# Patient Record
Sex: MALE | Race: BLACK OR AFRICAN AMERICAN | HISPANIC OR LATINO | URBAN - METROPOLITAN AREA
[De-identification: names, ages, dates, MRNs, and addresses within clinical notes are randomized per-mention and may not be internally consistent; named-entity substitution may affect disease eponyms.]

---

## 2017-04-05 ENCOUNTER — INPATIENT (INPATIENT)
Facility: HOSPITAL | Age: 52
LOS: 4 days | Discharge: HOME | End: 2017-04-10
Attending: PSYCHIATRY & NEUROLOGY

## 2017-06-27 DIAGNOSIS — F17.210 NICOTINE DEPENDENCE, CIGARETTES, UNCOMPLICATED: ICD-10-CM

## 2017-06-27 DIAGNOSIS — F33.2 MAJOR DEPRESSIVE DISORDER, RECURRENT SEVERE WITHOUT PSYCHOTIC FEATURES: ICD-10-CM

## 2017-06-27 DIAGNOSIS — F12.10 CANNABIS ABUSE, UNCOMPLICATED: ICD-10-CM

## 2017-06-27 DIAGNOSIS — F31.4 BIPOLAR DISORDER, CURRENT EPISODE DEPRESSED, SEVERE, WITHOUT PSYCHOTIC FEATURES: ICD-10-CM

## 2017-06-27 DIAGNOSIS — R45.851 SUICIDAL IDEATIONS: ICD-10-CM

## 2017-06-27 DIAGNOSIS — F14.10 COCAINE ABUSE, UNCOMPLICATED: ICD-10-CM

## 2017-06-27 DIAGNOSIS — F10.20 ALCOHOL DEPENDENCE, UNCOMPLICATED: ICD-10-CM

## 2017-06-27 DIAGNOSIS — Z91.5 PERSONAL HISTORY OF SELF-HARM: ICD-10-CM

## 2017-07-09 ENCOUNTER — INPATIENT (INPATIENT)
Facility: HOSPITAL | Age: 52
LOS: 1 days | Discharge: HOME | End: 2017-07-11
Attending: PSYCHIATRY & NEUROLOGY

## 2017-07-09 DIAGNOSIS — F10.20 ALCOHOL DEPENDENCE, UNCOMPLICATED: ICD-10-CM

## 2017-07-09 DIAGNOSIS — F17.200 NICOTINE DEPENDENCE, UNSPECIFIED, UNCOMPLICATED: ICD-10-CM

## 2017-07-09 DIAGNOSIS — F93.0 SEPARATION ANXIETY DISORDER OF CHILDHOOD: ICD-10-CM

## 2017-07-17 DIAGNOSIS — Z91.5 PERSONAL HISTORY OF SELF-HARM: ICD-10-CM

## 2017-07-17 DIAGNOSIS — Z88.0 ALLERGY STATUS TO PENICILLIN: ICD-10-CM

## 2017-07-17 DIAGNOSIS — F12.10 CANNABIS ABUSE, UNCOMPLICATED: ICD-10-CM

## 2017-07-17 DIAGNOSIS — R45.851 SUICIDAL IDEATIONS: ICD-10-CM

## 2017-07-17 DIAGNOSIS — Z91.19 PATIENT'S NONCOMPLIANCE WITH OTHER MEDICAL TREATMENT AND REGIMEN: ICD-10-CM

## 2017-07-17 DIAGNOSIS — F41.9 ANXIETY DISORDER, UNSPECIFIED: ICD-10-CM

## 2017-07-17 DIAGNOSIS — F31.9 BIPOLAR DISORDER, UNSPECIFIED: ICD-10-CM

## 2017-07-17 DIAGNOSIS — F15.10 OTHER STIMULANT ABUSE, UNCOMPLICATED: ICD-10-CM

## 2017-07-17 DIAGNOSIS — F17.200 NICOTINE DEPENDENCE, UNSPECIFIED, UNCOMPLICATED: ICD-10-CM

## 2017-11-25 ENCOUNTER — INPATIENT (INPATIENT)
Facility: HOSPITAL | Age: 52
LOS: 5 days | Discharge: HOME | DRG: 885 | End: 2017-12-01
Attending: STUDENT IN AN ORGANIZED HEALTH CARE EDUCATION/TRAINING PROGRAM | Admitting: STUDENT IN AN ORGANIZED HEALTH CARE EDUCATION/TRAINING PROGRAM
Payer: COMMERCIAL

## 2017-11-25 VITALS
SYSTOLIC BLOOD PRESSURE: 125 MMHG | TEMPERATURE: 98 F | DIASTOLIC BLOOD PRESSURE: 75 MMHG | RESPIRATION RATE: 17 BRPM | HEART RATE: 88 BPM | OXYGEN SATURATION: 95 %

## 2017-11-25 DIAGNOSIS — F31.63 BIPOLAR DISORDER, CURRENT EPISODE MIXED, SEVERE, WITHOUT PSYCHOTIC FEATURES: ICD-10-CM

## 2017-11-25 LAB
ALBUMIN SERPL ELPH-MCNC: 4.5 G/DL — SIGNIFICANT CHANGE UP (ref 3.3–5)
ALP SERPL-CCNC: 85 U/L — SIGNIFICANT CHANGE UP (ref 40–120)
ALT FLD-CCNC: 37 U/L — SIGNIFICANT CHANGE UP (ref 10–45)
ANION GAP SERPL CALC-SCNC: 17 MMOL/L — SIGNIFICANT CHANGE UP (ref 5–17)
APAP SERPL-MCNC: <15 UG/ML — SIGNIFICANT CHANGE UP (ref 10–30)
APPEARANCE UR: CLEAR — SIGNIFICANT CHANGE UP
AST SERPL-CCNC: 40 U/L — SIGNIFICANT CHANGE UP (ref 10–40)
BASOPHILS NFR BLD AUTO: 0.5 % — SIGNIFICANT CHANGE UP (ref 0–2)
BILIRUB SERPL-MCNC: 0.2 MG/DL — SIGNIFICANT CHANGE UP (ref 0.2–1.2)
BILIRUB UR-MCNC: NEGATIVE — SIGNIFICANT CHANGE UP
BUN SERPL-MCNC: 20 MG/DL — SIGNIFICANT CHANGE UP (ref 7–23)
CALCIUM SERPL-MCNC: 9.4 MG/DL — SIGNIFICANT CHANGE UP (ref 8.4–10.5)
CHLORIDE SERPL-SCNC: 94 MMOL/L — LOW (ref 96–108)
CO2 SERPL-SCNC: 24 MMOL/L — SIGNIFICANT CHANGE UP (ref 22–31)
COLOR SPEC: YELLOW — SIGNIFICANT CHANGE UP
CREAT SERPL-MCNC: 0.87 MG/DL — SIGNIFICANT CHANGE UP (ref 0.5–1.3)
DIFF PNL FLD: NEGATIVE — SIGNIFICANT CHANGE UP
EOSINOPHIL NFR BLD AUTO: 6.1 % — HIGH (ref 0–6)
ETHANOL SERPL-MCNC: <10 MG/DL — SIGNIFICANT CHANGE UP (ref 0–10)
GLUCOSE SERPL-MCNC: 156 MG/DL — HIGH (ref 70–99)
GLUCOSE UR QL: NEGATIVE — SIGNIFICANT CHANGE UP
HCT VFR BLD CALC: 42 % — SIGNIFICANT CHANGE UP (ref 39–50)
HGB BLD-MCNC: 13.9 G/DL — SIGNIFICANT CHANGE UP (ref 13–17)
KETONES UR-MCNC: (no result) MG/DL
LEUKOCYTE ESTERASE UR-ACNC: NEGATIVE — SIGNIFICANT CHANGE UP
LYMPHOCYTES # BLD AUTO: 29.1 % — SIGNIFICANT CHANGE UP (ref 13–44)
MCHC RBC-ENTMCNC: 29.1 PG — SIGNIFICANT CHANGE UP (ref 27–34)
MCHC RBC-ENTMCNC: 33.1 G/DL — SIGNIFICANT CHANGE UP (ref 32–36)
MCV RBC AUTO: 88.1 FL — SIGNIFICANT CHANGE UP (ref 80–100)
MONOCYTES NFR BLD AUTO: 9.2 % — SIGNIFICANT CHANGE UP (ref 2–14)
NEUTROPHILS NFR BLD AUTO: 55.1 % — SIGNIFICANT CHANGE UP (ref 43–77)
NITRITE UR-MCNC: NEGATIVE — SIGNIFICANT CHANGE UP
PCP SPEC-MCNC: SIGNIFICANT CHANGE UP
PH UR: 5.5 — SIGNIFICANT CHANGE UP (ref 5–8)
PLATELET # BLD AUTO: 185 K/UL — SIGNIFICANT CHANGE UP (ref 150–400)
POTASSIUM SERPL-MCNC: 3.5 MMOL/L — SIGNIFICANT CHANGE UP (ref 3.5–5.3)
POTASSIUM SERPL-SCNC: 3.5 MMOL/L — SIGNIFICANT CHANGE UP (ref 3.5–5.3)
PROT SERPL-MCNC: 8.1 G/DL — SIGNIFICANT CHANGE UP (ref 6–8.3)
PROT UR-MCNC: NEGATIVE MG/DL — SIGNIFICANT CHANGE UP
RBC # BLD: 4.77 M/UL — SIGNIFICANT CHANGE UP (ref 4.2–5.8)
RBC # FLD: 13.1 % — SIGNIFICANT CHANGE UP (ref 10.3–16.9)
SALICYLATES SERPL-MCNC: <0.3 MG/DL — LOW (ref 2.8–20)
SODIUM SERPL-SCNC: 135 MMOL/L — SIGNIFICANT CHANGE UP (ref 135–145)
SP GR SPEC: 1.02 — SIGNIFICANT CHANGE UP (ref 1–1.03)
UROBILINOGEN FLD QL: 0.2 E.U./DL — SIGNIFICANT CHANGE UP
WBC # BLD: 12.6 K/UL — HIGH (ref 3.8–10.5)
WBC # FLD AUTO: 12.6 K/UL — HIGH (ref 3.8–10.5)

## 2017-11-25 PROCEDURE — 93010 ELECTROCARDIOGRAM REPORT: CPT | Mod: 77

## 2017-11-25 PROCEDURE — 93010 ELECTROCARDIOGRAM REPORT: CPT

## 2017-11-25 PROCEDURE — 99285 EMERGENCY DEPT VISIT HI MDM: CPT | Mod: 25

## 2017-11-25 PROCEDURE — G0427: CPT | Mod: GT

## 2017-11-25 RX ORDER — QUETIAPINE FUMARATE 200 MG/1
100 TABLET, FILM COATED ORAL ONCE
Qty: 0 | Refills: 0 | Status: COMPLETED | OUTPATIENT
Start: 2017-11-25 | End: 2017-11-25

## 2017-11-25 RX ORDER — HALOPERIDOL DECANOATE 100 MG/ML
5 INJECTION INTRAMUSCULAR EVERY 8 HOURS
Qty: 0 | Refills: 0 | Status: DISCONTINUED | OUTPATIENT
Start: 2017-11-25 | End: 2017-12-01

## 2017-11-25 RX ORDER — NICOTINE POLACRILEX 2 MG
1 GUM BUCCAL DAILY
Qty: 0 | Refills: 0 | Status: DISCONTINUED | OUTPATIENT
Start: 2017-11-25 | End: 2017-11-28

## 2017-11-25 RX ORDER — HYDROXYZINE HCL 10 MG
25 TABLET ORAL AT BEDTIME
Qty: 0 | Refills: 0 | Status: DISCONTINUED | OUTPATIENT
Start: 2017-11-25 | End: 2017-12-01

## 2017-11-25 RX ADMIN — QUETIAPINE FUMARATE 100 MILLIGRAM(S): 200 TABLET, FILM COATED ORAL at 21:12

## 2017-11-25 NOTE — ED ADULT TRIAGE NOTE - ARRIVAL INFO ADDITIONAL COMMENTS
Pt walked into Ed with c/o of depression and SI thoughts. Pt has thoughts of cutting his left arm with a razor. Pt. states he walked into Ed and gave his razor to security and is seeking help. PT. denies hallucinations, cutting himself, CP, alcohol or drugs. PT. has hx of depression and bipolar disorder. PT. has hx of SI attempts ( overdose medication).  Denies taking his medication. Pt has one to one at bedside

## 2017-11-25 NOTE — ED BEHAVIORAL HEALTH ASSESSMENT NOTE - PSYCHIATRIC ISSUES AND PLAN (INCLUDE STANDING AND PRN MEDICATION)
will order prn for evening, but will defer meds to primary team after currently state of intox clears

## 2017-11-25 NOTE — ED BEHAVIORAL HEALTH ASSESSMENT NOTE - RISK ASSESSMENT
Patient is an acute risk to himself given his presented after purchasing a razor to cut himself. He has limited supports and ongoing substance use, and has disengaged with care increasing his risk of harm to self or others. He requires hospitalization for safety and stabilization.

## 2017-11-25 NOTE — ED BEHAVIORAL HEALTH ASSESSMENT NOTE - SUMMARY
51yo AA man, living with gf, unemployed, with dx of bipolar disorder, multiple prior admissions since childhood, 1 prior suicide attempt in 20s, hx of SIB, +alcohol, cannabis, cocaine use, incarcerated for 6 years for drug possession/grand larceny, no known hx of violence, presents with reports of suicidal thinking with plan to cut self in context of reported manic episode for 3 days and substance use. On exam he is odd, with incongruent affect, and continues to endorse SI.  Utox positive for cocaine and THC, likely contributing factors. He requesting hospitalization for safety and stabilization.

## 2017-11-25 NOTE — ED BEHAVIORAL HEALTH ASSESSMENT NOTE - DESCRIPTION (FIRST USE, LAST USE, QUANTITY, FREQUENCY, DURATION)
1ppd drinks heavily on weekends 8 beers + 1 pint, no hx of withdrawal symptoms smokes 2-3x/week "when I can get the money"

## 2017-11-25 NOTE — ED BEHAVIORAL HEALTH ASSESSMENT NOTE - DESCRIPTION
calm and cooperative none Grew up in [place], lived in a [facility] for some time, younger brother and several older siblings, did not finish Snapchat, does handy jobs for neighbors, was  young but  for 15 years, no children

## 2017-11-25 NOTE — ED BEHAVIORAL HEALTH ASSESSMENT NOTE - OTHER PAST PSYCHIATRIC HISTORY (INCLUDE DETAILS REGARDING ONSET, COURSE OF ILLNESS, INPATIENT/OUTPATIENT TREATMENT)
Long history. Was hospitalized at [facility] for 3-6 months as a child, states his mother couldn't control him. Diagnosed with bipolar disorder. Multiple prior hospitalizations and 2 years in forensic unit when incarcerated. Recently in outpatient treatment in [place]

## 2017-11-25 NOTE — ED PROVIDER NOTE - PHYSICAL EXAMINATION
CON: ao x 3, HENMT: clear oropharynx, soft neck, HEAD: atraumatic, CV: rrr, equal pulses b/l, RESP: cta b/l, GI: +BS, soft, nontender, no rebound, no guarding, SKIN: old scar noted to L forearm, MSK: no edema, moving all extremities spontaneously, PSYCH: active SI, depressed affect

## 2017-11-25 NOTE — ED ADULT NURSE NOTE - NSSISCREENINGSIGNS_ED_A_ED
hopelessness/substance abuse ( +sbirt)/talking about suicide hopelessness/non-complaint with treatments/talking about suicide/substance abuse ( +sbirt)

## 2017-11-25 NOTE — ED BEHAVIORAL HEALTH ASSESSMENT NOTE - AXIS IV
Problems with primary support/Housing problems/Problem related to social environment/Economic problems

## 2017-11-25 NOTE — ED BEHAVIORAL HEALTH ASSESSMENT NOTE - HPI (INCLUDE ILLNESS QUALITY, SEVERITY, DURATION, TIMING, CONTEXT, MODIFYING FACTORS, ASSOCIATED SIGNS AND SYMPTOMS)
51yo AA man, living with gf, unemployed, with dx of bipolar disorder, multiple prior admissions since childhood, 1 prior suicide attempt in 20s, hx of SIB, +alcohol, cannabis, cocaine use, incarcerated for 6 years for drug possession/grand larceny, no known hx of violence, presents with reports of suicidal thinking with plan to cut self. He states that for the past few days he has been walking around the city nonstop in a "manic" episode, not sleeping, but also feeling depressed and using drugs. He denies other symptoms of depression and jadon, but states that today he had a plan to cut himself, bought a razor but then gave it to  in ER. He states he stopped his meds a month ago because he read that depakote can hurt his liver and the seroquel was making him tired. He has a psychiatrist in [place] Dr Jeremy Rucker(?). HE spoke with his family in [place] yesterday but he states he wasn't able to visit them because they are afraid that he won't leave. He would not give his gf's information stating that she doesn't want him to and that he thinks she's embarrassed of him. He gave contact info for brother who didn't answer the phone overnight: kimmie morrow [phone]

## 2017-11-25 NOTE — ED PROVIDER NOTE - OBJECTIVE STATEMENT
52 yom pw SI, w/ cutting wrist today.  hx of depression.  had previous attempt early 2017, placed on meds but stopped d/t concern for SE.  reports worsening of depression.  reports occasional auditory hallucination w/ voice telling him he's worthless but does not recall whose voice it is.  no HI, no visual hallucination.

## 2017-11-25 NOTE — ED ADULT NURSE NOTE - SUICIDE ATTEMPT DETAILS
h/o of cutting wrist . Attempted today, no laceration ,no abrasion noted. to both wrist and decided to see psychiatrist

## 2017-11-26 DIAGNOSIS — F19.10 OTHER PSYCHOACTIVE SUBSTANCE ABUSE, UNCOMPLICATED: ICD-10-CM

## 2017-11-26 RX ORDER — QUETIAPINE FUMARATE 200 MG/1
100 TABLET, FILM COATED ORAL AT BEDTIME
Qty: 0 | Refills: 0 | Status: DISCONTINUED | OUTPATIENT
Start: 2017-11-26 | End: 2017-11-28

## 2017-11-26 RX ADMIN — QUETIAPINE FUMARATE 100 MILLIGRAM(S): 200 TABLET, FILM COATED ORAL at 22:23

## 2017-11-26 NOTE — PROGRESS NOTE BEHAVIORAL HEALTH - NSBHFUPINTERVALHXFT_PSY_A_CORE
No acute events over night. Patient does not show si/sx of alcohol withdrawal. Mood improved. Patient active in community. No complaints offered.

## 2017-11-27 PROCEDURE — 99223 1ST HOSP IP/OBS HIGH 75: CPT

## 2017-11-27 RX ADMIN — QUETIAPINE FUMARATE 100 MILLIGRAM(S): 200 TABLET, FILM COATED ORAL at 22:47

## 2017-11-27 RX ADMIN — Medication 1 APPLICATION(S): at 18:07
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
## 2017-11-27 NOTE — BEHAVIORAL HEALTH ASSESSMENT NOTE - SUMMARY
53yo AA man, living with gf, unemployed, with dx of bipolar disorder, multiple prior admissions since childhood, 1 prior suicide attempt in 20s, hx of SIB, +alcohol, cannabis, cocaine use, incarcerated for 6 years for drug possession/grand larceny, no known hx of violence, presents with reports of suicidal thinking with plan to cut self in context of reported manic episode for 3 days and substance use. On exam he is odd, with incongruent affect, and continues to endorse SI.  Utox positive for cocaine and THC, likely contributing factors.

## 2017-11-27 NOTE — BEHAVIORAL HEALTH ASSESSMENT NOTE - NSBHADMITCOUNSEL_PSY_A_CORE
importance of adherence to chosen treatment/risks and benefits of treatment options/instructions for management, treatment and follow up

## 2017-11-27 NOTE — BEHAVIORAL HEALTH ASSESSMENT NOTE - HPI (INCLUDE ILLNESS QUALITY, SEVERITY, DURATION, TIMING, CONTEXT, MODIFYING FACTORS, ASSOCIATED SIGNS AND SYMPTOMS)
This is a 53 y/o single AA male, currently living with  and unemployed. He has a psychiatric history of multiply prior psychiatric hospitalizations, a history of one documented suicide attempt in his 20s, a history of NSSIB, polysubstance use (alcohol, cocaine, cannabis), prior incarceration (serving 6 years of drug possession/grand larceny. Patient self-presented to ED with suicidal ideations with plans of cutting himself.     Patient stated that he has been feeling depressed for the last few days as he stopped taking his medications some time ago. He states that he found out that the medications he was prescribed could affect his liver. He reports that he got frustrated as his dosage of his Depakote and his Seroquel were continually being raised, respectively 500mg BID and 200mg BID and informed his psychiatrist that he would stop taking them. He denied having any side effects or adverse reactions to med regimen and reported that his mood was stable. Patient shared that he was thinking of cutting his arm with a razor, but instead gave the razor to a  in the ER. He has prior history of NSSIB, healed superficial lacerations noted on left forearm. He endorses feelings of hopelessness, helplessness, unable to provide any protective factors against suicide. He terminated evaluation, reporting that he no longer wanted to speak and would only consider taking Seroquel at this time.     Per ED report, patient stated that he was endorsing feelings of jadon, not sleeping, walking around the city non-stop, but also experiencing depression and using substances.

## 2017-11-27 NOTE — BEHAVIORAL HEALTH ASSESSMENT NOTE - PROBLEM SELECTOR PLAN 1
Restart Seroquel at 100mg QHS and titrate to BID  Labs ordered for 11/28 am: a1c, lipid panel, folate, thyroid panel, vitamin b12, vitamin d, depakote

## 2017-11-27 NOTE — BEHAVIORAL HEALTH ASSESSMENT NOTE - NSBHCHARTREVIEWVS_PSY_A_CORE FT
Vital Signs Last 24 Hrs  T(C): 36.9 (27 Nov 2017 10:00), Max: 36.9 (26 Nov 2017 16:14)  T(F): 98.4 (27 Nov 2017 10:00), Max: 98.5 (26 Nov 2017 16:14)  HR: 80 (27 Nov 2017 10:00) (74 - 80)  BP: 155/85 (27 Nov 2017 10:00) (155/85 - 165/93)  BP(mean): --  RR: 16 (27 Nov 2017 10:00) (16 - 17)  SpO2: --

## 2017-11-27 NOTE — BEHAVIORAL HEALTH ASSESSMENT NOTE - OTHER PAST PSYCHIATRIC HISTORY (INCLUDE DETAILS REGARDING ONSET, COURSE OF ILLNESS, INPATIENT/OUTPATIENT TREATMENT)
Patient has a history of multiple psychiatric hospitalizations since childhood, last hospitalization at [facility] was several years ago. Per review of medical records, patient has a history of bipolar disorder, polysubstance use disorder, history of medication non-compliance.

## 2017-11-28 DIAGNOSIS — R52 PAIN, UNSPECIFIED: ICD-10-CM

## 2017-11-28 DIAGNOSIS — F17.200 NICOTINE DEPENDENCE, UNSPECIFIED, UNCOMPLICATED: ICD-10-CM

## 2017-11-28 DIAGNOSIS — I10 ESSENTIAL (PRIMARY) HYPERTENSION: ICD-10-CM

## 2017-11-28 LAB
24R-OH-CALCIDIOL SERPL-MCNC: 15.2 NG/ML — LOW (ref 30–80)
CHOLEST SERPL-MCNC: 173 MG/DL — SIGNIFICANT CHANGE UP (ref 10–199)
FOLATE SERPL-MCNC: 12.4 NG/ML — SIGNIFICANT CHANGE UP (ref 4.8–24.2)
HDLC SERPL-MCNC: 44 MG/DL — SIGNIFICANT CHANGE UP (ref 40–125)
LIPID PNL WITH DIRECT LDL SERPL: 103 MG/DL — SIGNIFICANT CHANGE UP
T3 SERPL-MCNC: 103 NG/DL — SIGNIFICANT CHANGE UP (ref 80–200)
T4 AB SER-ACNC: 5.64 UG/DL — SIGNIFICANT CHANGE UP (ref 3.17–11.72)
TOTAL CHOLESTEROL/HDL RATIO MEASUREMENT: 3.9 RATIO — SIGNIFICANT CHANGE UP (ref 3.4–9.6)
TRIGL SERPL-MCNC: 130 MG/DL — SIGNIFICANT CHANGE UP (ref 10–149)
TSH SERPL-MCNC: 0.61 UIU/ML — SIGNIFICANT CHANGE UP (ref 0.35–4.94)
VALPROATE SERPL-MCNC: <3 UG/ML — LOW (ref 50–100)
VIT B12 SERPL-MCNC: 356 PG/ML — SIGNIFICANT CHANGE UP (ref 243–894)

## 2017-11-28 PROCEDURE — 99222 1ST HOSP IP/OBS MODERATE 55: CPT | Mod: GC

## 2017-11-28 PROCEDURE — 99232 SBSQ HOSP IP/OBS MODERATE 35: CPT

## 2017-11-28 RX ORDER — NICOTINE POLACRILEX 2 MG
1 GUM BUCCAL DAILY
Qty: 0 | Refills: 0 | Status: DISCONTINUED | OUTPATIENT
Start: 2017-11-28 | End: 2017-12-01

## 2017-11-28 RX ORDER — IBUPROFEN 200 MG
600 TABLET ORAL EVERY 8 HOURS
Qty: 0 | Refills: 0 | Status: DISCONTINUED | OUTPATIENT
Start: 2017-11-28 | End: 2017-12-01

## 2017-11-28 RX ORDER — CHLORTHALIDONE 50 MG
12.5 TABLET ORAL ONCE
Qty: 0 | Refills: 0 | Status: COMPLETED | OUTPATIENT
Start: 2017-11-28 | End: 2017-11-28

## 2017-11-28 RX ORDER — NYSTATIN CREAM 100000 [USP'U]/G
1 CREAM TOPICAL
Qty: 0 | Refills: 0 | Status: DISCONTINUED | OUTPATIENT
Start: 2017-11-28 | End: 2017-12-01

## 2017-11-28 RX ORDER — QUETIAPINE FUMARATE 200 MG/1
200 TABLET, FILM COATED ORAL AT BEDTIME
Qty: 0 | Refills: 0 | Status: DISCONTINUED | OUTPATIENT
Start: 2017-11-28 | End: 2017-11-29

## 2017-11-28 RX ORDER — NICOTINE POLACRILEX 2 MG
2 GUM BUCCAL
Qty: 0 | Refills: 0 | Status: DISCONTINUED | OUTPATIENT
Start: 2017-11-28 | End: 2017-12-01

## 2017-11-28 RX ORDER — BACITRACIN ZINC 500 UNIT/G
1 OINTMENT IN PACKET (EA) TOPICAL
Qty: 0 | Refills: 0 | Status: DISCONTINUED | OUTPATIENT
Start: 2017-11-28 | End: 2017-11-28

## 2017-11-28 RX ORDER — CHLORTHALIDONE 50 MG
12.5 TABLET ORAL DAILY
Qty: 0 | Refills: 0 | Status: DISCONTINUED | OUTPATIENT
Start: 2017-11-28 | End: 2017-12-01

## 2017-11-28 RX ADMIN — Medication 600 MILLIGRAM(S): at 15:55

## 2017-11-28 RX ADMIN — Medication 1 PATCH: at 19:49

## 2017-11-28 RX ADMIN — Medication 1 MILLIGRAM(S): at 22:55

## 2017-11-28 RX ADMIN — Medication 600 MILLIGRAM(S): at 13:34

## 2017-11-28 RX ADMIN — Medication 600 MILLIGRAM(S): at 22:08

## 2017-11-28 RX ADMIN — Medication 600 MILLIGRAM(S): at 22:47

## 2017-11-28 RX ADMIN — Medication 12.5 MILLIGRAM(S): at 22:25

## 2017-11-28 RX ADMIN — NYSTATIN CREAM 1 APPLICATION(S): 100000 CREAM TOPICAL at 22:06

## 2017-11-28 RX ADMIN — Medication 1 APPLICATION(S): at 13:59

## 2017-11-28 RX ADMIN — QUETIAPINE FUMARATE 200 MILLIGRAM(S): 200 TABLET, FILM COATED ORAL at 22:06

## 2017-11-28 NOTE — PROGRESS NOTE BEHAVIORAL HEALTH - NSBHFUPINTERVALHXFT_PSY_A_CORE
Patient reports feeling unwell today. He reports that he has been thinking a lot about his past and his future and is finding the holidays a difficult time of year to deal with. He expressed remorse regarding past actions, but is also hopeful that things will get better in the future and verbalizing that suicide isn't an option. Patient reports feeling unwell today. He reports that he has been thinking a lot about his past and his future and is finding the holidays a difficult time of year to deal with. He expressed remorse regarding past actions, but is also hopeful that things will get better in the future and verbalizing that suicide isn't an option. He states that he submitted a 72 hour letter last night due to craving for tobacco, but soon retracted his letter as he felt he wasn't ready to be discharged yet. He states that he will stay in the hospital until he is better stabilized. He currently denies si/hi, no a/v hallucinations or paranoia

## 2017-11-28 NOTE — PROGRESS NOTE BEHAVIORAL HEALTH - PROBLEM SELECTOR PLAN 1
Increase Seroquel to 200mg QHS, continue titration as appropriate  Labs ordered for 11/28 am: a1c, lipid panel, folate, thyroid panel, vitamin b12, vitamin d, depakote

## 2017-11-29 DIAGNOSIS — B35.3 TINEA PEDIS: ICD-10-CM

## 2017-11-29 PROCEDURE — 99233 SBSQ HOSP IP/OBS HIGH 50: CPT | Mod: GC

## 2017-11-29 PROCEDURE — 99232 SBSQ HOSP IP/OBS MODERATE 35: CPT

## 2017-11-29 RX ORDER — CHOLECALCIFEROL (VITAMIN D3) 125 MCG
1000 CAPSULE ORAL DAILY
Qty: 0 | Refills: 0 | Status: DISCONTINUED | OUTPATIENT
Start: 2017-11-29 | End: 2017-12-01

## 2017-11-29 RX ORDER — QUETIAPINE FUMARATE 200 MG/1
100 TABLET, FILM COATED ORAL
Qty: 0 | Refills: 0 | Status: DISCONTINUED | OUTPATIENT
Start: 2017-11-29 | End: 2017-12-01

## 2017-11-29 RX ORDER — ESCITALOPRAM OXALATE 10 MG/1
10 TABLET, FILM COATED ORAL DAILY
Qty: 0 | Refills: 0 | Status: DISCONTINUED | OUTPATIENT
Start: 2017-11-30 | End: 2017-12-01

## 2017-11-29 RX ADMIN — Medication 1000 UNIT(S): at 19:35

## 2017-11-29 RX ADMIN — Medication 600 MILLIGRAM(S): at 19:46

## 2017-11-29 RX ADMIN — QUETIAPINE FUMARATE 100 MILLIGRAM(S): 200 TABLET, FILM COATED ORAL at 22:16

## 2017-11-29 RX ADMIN — NYSTATIN CREAM 1 APPLICATION(S): 100000 CREAM TOPICAL at 16:05

## 2017-11-29 RX ADMIN — Medication 600 MILLIGRAM(S): at 19:47

## 2017-11-29 RX ADMIN — Medication 12.5 MILLIGRAM(S): at 16:05

## 2017-11-29 RX ADMIN — Medication 1 PATCH: at 19:10

## 2017-11-29 NOTE — PROGRESS NOTE BEHAVIORAL HEALTH - NSBHFUPINTERVALHXFT_PSY_A_CORE
Patient reports that he has been thinking a lot about his aftercare and discharge and intends to take a bus from [facility] to spend time with his family in [place] for the holidays at time of discharge. He endorses feelings of depression and feelings of remorse, but is hopeful that things will get better and was open to discussing medication options to help treat his depression. He denies si/hi, no a/v hallucinations or paranoia. He reported being unable to tolerate yesterday's increase in Seroquel preferring it to be dosed 100mg BID instead of 200mg QHS. He is also tolerated the new addition of his antihypertensive to his regimen with side effects or adverse reactions.

## 2017-11-29 NOTE — PROGRESS NOTE BEHAVIORAL HEALTH - PROBLEM SELECTOR PLAN 1
Increase Seroquel to 200mg QHS, continue titration as appropriate  Labs ordered for 11/28 am: a1c, lipid panel, folate, thyroid panel, vitamin b12, vitamin d, depakote Seroquel 100mg BID, continue titration as appropriate  Lexapro 10mg daily

## 2017-11-30 PROCEDURE — 99233 SBSQ HOSP IP/OBS HIGH 50: CPT

## 2017-11-30 PROCEDURE — 99232 SBSQ HOSP IP/OBS MODERATE 35: CPT

## 2017-11-30 RX ADMIN — Medication 1 APPLICATION(S): at 22:31

## 2017-11-30 RX ADMIN — Medication 1 APPLICATION(S): at 14:01

## 2017-11-30 RX ADMIN — Medication 600 MILLIGRAM(S): at 15:11

## 2017-11-30 RX ADMIN — QUETIAPINE FUMARATE 100 MILLIGRAM(S): 200 TABLET, FILM COATED ORAL at 22:31

## 2017-11-30 RX ADMIN — Medication 12.5 MILLIGRAM(S): at 16:34

## 2017-11-30 NOTE — PROGRESS NOTE BEHAVIORAL HEALTH - NSBHFUPINTERVALHXFT_PSY_A_CORE
Patient reports feeling better today and is requesting discharge so that he can get his clothing from his girlfriend's house and plan to get to [place] by the weekend. He states that he believes that he will be able to get one of his brother's or another friend to help buy his bus ticket. He denies any symptoms of depression, low mood or anxiety. No si/hi, no a/v hallucinations or paranoia. Sleep and appetite are reportedly fair. He states that he did not take any of his am medications this morning as he wanted to sleep, but will take them later in the afternoon.    Per staff report, patient has been selectively attending groups and interacting with peers. Report also states that patient appeared menacing towards staff last night, but was in behavioral control.

## 2017-12-01 VITALS
HEART RATE: 68 BPM | RESPIRATION RATE: 18 BRPM | DIASTOLIC BLOOD PRESSURE: 97 MMHG | SYSTOLIC BLOOD PRESSURE: 143 MMHG | TEMPERATURE: 97 F

## 2017-12-01 PROCEDURE — 99238 HOSP IP/OBS DSCHRG MGMT 30/<: CPT

## 2017-12-01 PROCEDURE — 80053 COMPREHEN METABOLIC PANEL: CPT

## 2017-12-01 PROCEDURE — 84443 ASSAY THYROID STIM HORMONE: CPT

## 2017-12-01 PROCEDURE — 99285 EMERGENCY DEPT VISIT HI MDM: CPT | Mod: 25

## 2017-12-01 PROCEDURE — 80164 ASSAY DIPROPYLACETIC ACD TOT: CPT

## 2017-12-01 PROCEDURE — 80061 LIPID PANEL: CPT

## 2017-12-01 PROCEDURE — 82746 ASSAY OF FOLIC ACID SERUM: CPT

## 2017-12-01 PROCEDURE — 36415 COLL VENOUS BLD VENIPUNCTURE: CPT

## 2017-12-01 PROCEDURE — 82306 VITAMIN D 25 HYDROXY: CPT

## 2017-12-01 PROCEDURE — 80307 DRUG TEST PRSMV CHEM ANLYZR: CPT

## 2017-12-01 PROCEDURE — 93005 ELECTROCARDIOGRAM TRACING: CPT

## 2017-12-01 PROCEDURE — 85025 COMPLETE CBC W/AUTO DIFF WBC: CPT

## 2017-12-01 PROCEDURE — 81003 URINALYSIS AUTO W/O SCOPE: CPT

## 2017-12-01 PROCEDURE — 84436 ASSAY OF TOTAL THYROXINE: CPT

## 2017-12-01 PROCEDURE — 82607 VITAMIN B-12: CPT

## 2017-12-01 PROCEDURE — 84480 ASSAY TRIIODOTHYRONINE (T3): CPT

## 2017-12-01 RX ORDER — QUETIAPINE FUMARATE 200 MG/1
1 TABLET, FILM COATED ORAL
Qty: 0 | Refills: 0 | COMMUNITY
Start: 2017-12-01

## 2017-12-01 RX ORDER — QUETIAPINE FUMARATE 200 MG/1
1 TABLET, FILM COATED ORAL
Qty: 28 | Refills: 0 | OUTPATIENT
Start: 2017-12-01 | End: 2017-12-15

## 2017-12-01 RX ORDER — CHLORTHALIDONE 50 MG
0.5 TABLET ORAL
Qty: 15 | Refills: 0 | OUTPATIENT
Start: 2017-12-01 | End: 2017-12-31

## 2017-12-01 RX ORDER — NYSTATIN CREAM 100000 [USP'U]/G
1 CREAM TOPICAL
Qty: 1 | Refills: 0 | OUTPATIENT
Start: 2017-12-01 | End: 2017-12-31

## 2017-12-01 RX ORDER — DIVALPROEX SODIUM 500 MG/1
1 TABLET, DELAYED RELEASE ORAL
Qty: 0 | Refills: 0 | COMMUNITY

## 2017-12-01 RX ORDER — NICOTINE POLACRILEX 2 MG
0 GUM BUCCAL
Qty: 0 | Refills: 0 | COMMUNITY
Start: 2017-12-01

## 2017-12-01 RX ORDER — CHLORTHALIDONE 50 MG
0 TABLET ORAL
Qty: 0 | Refills: 0 | COMMUNITY
Start: 2017-12-01

## 2017-12-01 RX ORDER — NICOTINE POLACRILEX 2 MG
1 GUM BUCCAL
Qty: 100 | Refills: 0 | OUTPATIENT
Start: 2017-12-01 | End: 2017-12-31

## 2017-12-01 RX ORDER — QUETIAPINE FUMARATE 200 MG/1
1 TABLET, FILM COATED ORAL
Qty: 0 | Refills: 0 | COMMUNITY

## 2017-12-01 RX ADMIN — Medication 25 MILLIGRAM(S): at 00:13

## 2017-12-01 RX ADMIN — Medication 600 MILLIGRAM(S): at 11:02

## 2017-12-01 RX ADMIN — Medication 12.5 MILLIGRAM(S): at 11:02

## 2017-12-01 NOTE — PROGRESS NOTE BEHAVIORAL HEALTH - NSBHCHARTREVIEWVS_PSY_A_CORE FT
Vital Signs Last 24 Hrs  T(C): 36.1 (30 Nov 2017 08:41), Max: 37.2 (29 Nov 2017 16:14)  T(F): 97 (30 Nov 2017 08:41), Max: 99 (29 Nov 2017 16:14)  HR: 73 (30 Nov 2017 08:41) (73 - 85)  BP: 123/71 (30 Nov 2017 08:41) (123/71 - 163/91)  BP(mean): --  RR: 20 (30 Nov 2017 08:41) (20 - 20)  SpO2: --  Weekly weight 216lbs 11/29/17
Vital Signs Last 24 Hrs  T(C): 36.3 (01 Dec 2017 10:00), Max: 37 (30 Nov 2017 16:38)  T(F): 97.3 (01 Dec 2017 10:00), Max: 98.6 (30 Nov 2017 16:38)  HR: 68 (01 Dec 2017 10:00) (68 - 73)  BP: 143/97 (01 Dec 2017 10:00) (143/97 - 145/88)  BP(mean): --  RR: 18 (01 Dec 2017 10:00) (18 - 18)  SpO2: --  Weekly weight 216lbs 11/29/17
Vital Signs Last 24 Hrs  T(C): 36.1 (29 Nov 2017 09:39), Max: 36.9 (28 Nov 2017 23:33)  T(F): 97 (29 Nov 2017 09:39), Max: 98.4 (28 Nov 2017 23:33)  HR: 74 (29 Nov 2017 09:39) (66 - 85)  BP: 147/80 (29 Nov 2017 09:39) (137/86 - 188/102)  BP(mean): --  RR: 20 (29 Nov 2017 09:39) (16 - 20)  SpO2: --  Weekly weight 216lbs 11/29/17
Vital Signs Last 24 Hrs  T(C): 36.9 (28 Nov 2017 13:35), Max: 36.9 (28 Nov 2017 13:35)  T(F): 98.5 (28 Nov 2017 13:35), Max: 98.5 (28 Nov 2017 13:35)  HR: 76 (28 Nov 2017 13:35) (67 - 76)  BP: 164/83 (28 Nov 2017 13:35) (152/77 - 183/111)  BP(mean): --  RR: 18 (28 Nov 2017 13:35) (17 - 18)  SpO2: --  Weekly weight 216lbs

## 2017-12-01 NOTE — PROGRESS NOTE BEHAVIORAL HEALTH - NSBHADMITCOORDWITH_PSY_A_CORE
outpatient provider/social work/medical staff
outpatient provider/social work/medical staff
medical staff/outpatient provider/social work
medical staff/outpatient provider/social work

## 2017-12-01 NOTE — PROGRESS NOTE BEHAVIORAL HEALTH - PROBLEM SELECTOR PLAN 2
PRNs for withdrawal
12 steps  AA/substance use groups

## 2017-12-01 NOTE — PROGRESS NOTE BEHAVIORAL HEALTH - ABNORMAL MOVEMENTS
No abnormal movements

## 2017-12-01 NOTE — PROGRESS NOTE BEHAVIORAL HEALTH - PROBLEM SELECTOR PLAN 3
Medicine consult team consulted and they are following:  Chlorthalidone 12.5mg daily
Medicine consult team consulted and they are following:  Chlorthalidone 12.5mg daily
Medicine consult team consulted-currently awaiting recommendations
Medicine consult team consulted and they are following:  Chlorthalidone 12.5mg daily

## 2017-12-01 NOTE — PROGRESS NOTE BEHAVIORAL HEALTH - NSBHADMITCOUNSEL_PSY_A_CORE
importance of adherence to chosen treatment/instructions for management, treatment and follow up/risks and benefits of treatment options
importance of adherence to chosen treatment/risks and benefits of treatment options/instructions for management, treatment and follow up
importance of adherence to chosen treatment/risks and benefits of treatment options/instructions for management, treatment and follow up
instructions for management, treatment and follow up/risks and benefits of treatment options/importance of adherence to chosen treatment

## 2017-12-01 NOTE — PROGRESS NOTE BEHAVIORAL HEALTH - PROBLEM SELECTOR PLAN 5
Nicotine patch 14mg daily  Nicorette gum 2mg q2 hours PRN

## 2017-12-01 NOTE — PROGRESS NOTE BEHAVIORAL HEALTH - GAIT / STATION
Normal gait / station

## 2017-12-01 NOTE — PROGRESS NOTE BEHAVIORAL HEALTH - NSBHFUPINTERVALHXFT_PSY_A_CORE
Patient requesting discharge, reporting that his mood symptoms have improved during his admission. He denies thoughts of death, suicide or self-harm. No a/v hallucinations or paranoia. He is future oriented anticipating moving to [place] to be with family with next several days. He has refused Lexapro, citing that he no longer needs it as he doesn't feel depressed anymore. He also denies symptoms of anxiety. Sleep and appetite are fair. Is visible on the unit, appropriately interacting with peers and staff.

## 2017-12-01 NOTE — PROGRESS NOTE BEHAVIORAL HEALTH - PROBLEM SELECTOR PLAN 1
Seroquel 100mg BID, continue titration as appropriate  Lexapro 10mg daily discontinued as patient refused it

## 2017-12-01 NOTE — PROGRESS NOTE BEHAVIORAL HEALTH - MUSCLE TONE / STRENGTH
Normal muscle tone/strength

## 2017-12-01 NOTE — PROGRESS NOTE BEHAVIORAL HEALTH - PRIMARY DX
Bipolar disorder, current episode mixed, severe, without psychotic features

## 2017-12-01 NOTE — PROGRESS NOTE BEHAVIORAL HEALTH - NSBHADMITDANGERSELF_PSY_A_CORE
suicidal ideation with plan and means
suicidal behavior
discharge planning

## 2017-12-01 NOTE — PROGRESS NOTE BEHAVIORAL HEALTH - SUMMARY
51yo AA man, living with gf, unemployed, with dx of bipolar disorder, multiple prior admissions since childhood, 1 prior suicide attempt in 20s, hx of SIB, +alcohol, cannabis, cocaine use, incarcerated for 6 years for drug possession/grand larceny, no known hx of violence, presents with reports of suicidal thinking with plan to cut self in context of reported manic episode for 3 days and substance use. On exam he is odd, with incongruent affect, and continues to endorse SI.  Utox positive for cocaine and THC, likely contributing factors.
53yo AA man, living with gf, unemployed, with dx of bipolar disorder, multiple prior admissions since childhood, 1 prior suicide attempt in 20s, hx of SIB, +alcohol, cannabis, cocaine use, incarcerated for 6 years for drug possession/grand larceny, no known hx of violence, presents with reports of suicidal thinking with plan to cut self in context of reported manic episode for 3 days and substance use. On exam he is odd, with incongruent affect, and continues to endorse SI.  Utox positive for cocaine and THC, likely contributing factors.    Patient currently reporting mood symptoms have improved and is requesting to be discharged tomorrow. He denies thoughts of death, suicide or self-harm. No a/v hallucinations or paranoia. He is noted to be focused on receiving clothing or shoes from the hospital prior to his discharge.
53yo AA man, living with gf, unemployed, with dx of bipolar disorder, multiple prior admissions since childhood, 1 prior suicide attempt in 20s, hx of SIB, +alcohol, cannabis, cocaine use, incarcerated for 6 years for drug possession/grand larceny, no known hx of violence, presents with reports of suicidal thinking with plan to cut self.
53yo AA man, living with gf, unemployed, with dx of bipolar disorder, multiple prior admissions since childhood, 1 prior suicide attempt in 20s, hx of SIB, +alcohol, cannabis, cocaine use, incarcerated for 6 years for drug possession/grand larceny, no known hx of violence, presents with reports of suicidal thinking with plan to cut self in context of reported manic episode for 3 days and substance use. On exam he is odd, with incongruent affect, and continues to endorse SI.  Utox positive for cocaine and THC, likely contributing factors.    Will plan for discharge today with appropriate medications and aftercare planning. Patient denies elements of suicidal thoughts or a/v hallucinations or paranoia. Is in behavioral control, and noted to be euthymic.
53yo AA man, living with gf, unemployed, with dx of bipolar disorder, multiple prior admissions since childhood, 1 prior suicide attempt in 20s, hx of SIB, +alcohol, cannabis, cocaine use, incarcerated for 6 years for drug possession/grand larceny, no known hx of violence, presents with reports of suicidal thinking with plan to cut self in context of reported manic episode for 3 days and substance use. On exam he is odd, with incongruent affect, and continues to endorse SI.  Utox positive for cocaine and THC, likely contributing factors.

## 2017-12-01 NOTE — PROGRESS NOTE BEHAVIORAL HEALTH - NSBHCHARTREVIEWLAB_PSY_A_CORE FT
Thyroid Stimulating Hormone, Serum: 0.611 uIU/mL (11-28-17 @ 07:01)  11-28 Chol 173  HDL 44 Trig 130   VPA <3  Vitamin D 15.2
Thyroid Stimulating Hormone, Serum: 0.611 uIU/mL (11-28-17 @ 07:01)  11-28 Chol 173  HDL 44 Trig 130   VPA <3

## 2017-12-01 NOTE — PROGRESS NOTE BEHAVIORAL HEALTH - NSBHATTESTSEENBY_PSY_A_CORE
NP without Attending Psychiatrist
NP without Attending Psychiatrist
Trainee with telephonic supervision from Attending Psychiatrist
NP without Attending Psychiatrist
NP without Attending Psychiatrist

## 2017-12-01 NOTE — PROGRESS NOTE BEHAVIORAL HEALTH - PROBLEM SELECTOR PLAN 4
Motrin 600mg q8 hours PRN pain

## 2017-12-04 DIAGNOSIS — I10 ESSENTIAL (PRIMARY) HYPERTENSION: ICD-10-CM

## 2017-12-04 DIAGNOSIS — F31.63 BIPOLAR DISORDER, CURRENT EPISODE MIXED, SEVERE, WITHOUT PSYCHOTIC FEATURES: ICD-10-CM

## 2017-12-04 DIAGNOSIS — R45.851 SUICIDAL IDEATIONS: ICD-10-CM

## 2017-12-04 DIAGNOSIS — B35.3 TINEA PEDIS: ICD-10-CM

## 2017-12-04 DIAGNOSIS — F19.10 OTHER PSYCHOACTIVE SUBSTANCE ABUSE, UNCOMPLICATED: ICD-10-CM

## 2017-12-04 DIAGNOSIS — F17.210 NICOTINE DEPENDENCE, CIGARETTES, UNCOMPLICATED: ICD-10-CM

## 2018-03-26 ENCOUNTER — INPATIENT (INPATIENT)
Facility: HOSPITAL | Age: 53
LOS: 9 days | Discharge: NURSING, REHAB OR LONG-TERM CARE | End: 2018-04-05
Attending: PSYCHIATRY & NEUROLOGY

## 2018-03-26 VITALS
TEMPERATURE: 98 F | SYSTOLIC BLOOD PRESSURE: 131 MMHG | HEART RATE: 74 BPM | OXYGEN SATURATION: 95 % | RESPIRATION RATE: 18 BRPM | DIASTOLIC BLOOD PRESSURE: 85 MMHG

## 2018-03-26 DIAGNOSIS — F32.9 MAJOR DEPRESSIVE DISORDER, SINGLE EPISODE, UNSPECIFIED: ICD-10-CM

## 2018-03-26 DIAGNOSIS — I10 ESSENTIAL (PRIMARY) HYPERTENSION: ICD-10-CM

## 2018-03-26 DIAGNOSIS — F31.4 BIPOLAR DISORDER, CURRENT EPISODE DEPRESSED, SEVERE, WITHOUT PSYCHOTIC FEATURES: ICD-10-CM

## 2018-03-26 DIAGNOSIS — B35.3 TINEA PEDIS: ICD-10-CM

## 2018-03-26 DIAGNOSIS — F19.20 OTHER PSYCHOACTIVE SUBSTANCE DEPENDENCE, UNCOMPLICATED: ICD-10-CM

## 2018-03-26 LAB
ALBUMIN SERPL ELPH-MCNC: 3.9 G/DL — SIGNIFICANT CHANGE UP (ref 3.5–5.2)
ALBUMIN SERPL ELPH-MCNC: 4.3 G/DL — SIGNIFICANT CHANGE UP (ref 3.5–5.2)
ALP SERPL-CCNC: 56 U/L — SIGNIFICANT CHANGE UP (ref 30–115)
ALP SERPL-CCNC: 67 U/L — SIGNIFICANT CHANGE UP (ref 30–115)
ALT FLD-CCNC: 17 U/L — SIGNIFICANT CHANGE UP (ref 0–41)
ALT FLD-CCNC: 22 U/L — SIGNIFICANT CHANGE UP (ref 0–41)
ANION GAP SERPL CALC-SCNC: 13 MMOL/L — SIGNIFICANT CHANGE UP (ref 7–14)
ANION GAP SERPL CALC-SCNC: 13 MMOL/L — SIGNIFICANT CHANGE UP (ref 7–14)
APAP SERPL-MCNC: <5 UG/ML — LOW (ref 10–30)
AST SERPL-CCNC: 15 U/L — SIGNIFICANT CHANGE UP (ref 0–41)
AST SERPL-CCNC: 24 U/L — SIGNIFICANT CHANGE UP (ref 0–41)
BASOPHILS # BLD AUTO: 0.05 K/UL — SIGNIFICANT CHANGE UP (ref 0–0.2)
BASOPHILS # BLD AUTO: 0.05 K/UL — SIGNIFICANT CHANGE UP (ref 0–0.2)
BASOPHILS NFR BLD AUTO: 0.5 % — SIGNIFICANT CHANGE UP (ref 0–1)
BASOPHILS NFR BLD AUTO: 0.6 % — SIGNIFICANT CHANGE UP (ref 0–1)
BILIRUB DIRECT SERPL-MCNC: <0.2 MG/DL — SIGNIFICANT CHANGE UP (ref 0–0.2)
BILIRUB INDIRECT FLD-MCNC: >0 MG/DL — LOW (ref 0.2–1.2)
BILIRUB SERPL-MCNC: 0.2 MG/DL — SIGNIFICANT CHANGE UP (ref 0.2–1.2)
BILIRUB SERPL-MCNC: 0.2 MG/DL — SIGNIFICANT CHANGE UP (ref 0.2–1.2)
BUN SERPL-MCNC: 16 MG/DL — SIGNIFICANT CHANGE UP (ref 10–20)
BUN SERPL-MCNC: 20 MG/DL — SIGNIFICANT CHANGE UP (ref 10–20)
CALCIUM SERPL-MCNC: 8.4 MG/DL — LOW (ref 8.5–10.1)
CALCIUM SERPL-MCNC: 9 MG/DL — SIGNIFICANT CHANGE UP (ref 8.5–10.1)
CHLORIDE SERPL-SCNC: 100 MMOL/L — SIGNIFICANT CHANGE UP (ref 98–110)
CHLORIDE SERPL-SCNC: 99 MMOL/L — SIGNIFICANT CHANGE UP (ref 98–110)
CO2 SERPL-SCNC: 26 MMOL/L — SIGNIFICANT CHANGE UP (ref 17–32)
CO2 SERPL-SCNC: 28 MMOL/L — SIGNIFICANT CHANGE UP (ref 17–32)
CREAT SERPL-MCNC: 0.7 MG/DL — SIGNIFICANT CHANGE UP (ref 0.7–1.5)
CREAT SERPL-MCNC: 0.8 MG/DL — SIGNIFICANT CHANGE UP (ref 0.7–1.5)
EOSINOPHIL # BLD AUTO: 0.56 K/UL — SIGNIFICANT CHANGE UP (ref 0–0.7)
EOSINOPHIL # BLD AUTO: 0.58 K/UL — SIGNIFICANT CHANGE UP (ref 0–0.7)
EOSINOPHIL NFR BLD AUTO: 5.7 % — SIGNIFICANT CHANGE UP (ref 0–8)
EOSINOPHIL NFR BLD AUTO: 6.9 % — SIGNIFICANT CHANGE UP (ref 0–8)
ETHANOL SERPL-MCNC: <10 MG/DL — HIGH
GLUCOSE SERPL-MCNC: 127 MG/DL — HIGH (ref 70–99)
GLUCOSE SERPL-MCNC: 173 MG/DL — HIGH (ref 70–99)
HCT VFR BLD CALC: 35.6 % — LOW (ref 42–52)
HCT VFR BLD CALC: 37.4 % — LOW (ref 42–52)
HGB BLD-MCNC: 11.3 G/DL — LOW (ref 14–18)
HGB BLD-MCNC: 12.2 G/DL — LOW (ref 14–18)
IMM GRANULOCYTES NFR BLD AUTO: 0.2 % — SIGNIFICANT CHANGE UP (ref 0.1–0.3)
IMM GRANULOCYTES NFR BLD AUTO: 0.3 % — SIGNIFICANT CHANGE UP (ref 0.1–0.3)
LYMPHOCYTES # BLD AUTO: 2.79 K/UL — SIGNIFICANT CHANGE UP (ref 1.2–3.4)
LYMPHOCYTES # BLD AUTO: 27.4 % — SIGNIFICANT CHANGE UP (ref 20.5–51.1)
LYMPHOCYTES # BLD AUTO: 3.46 K/UL — HIGH (ref 1.2–3.4)
LYMPHOCYTES # BLD AUTO: 42.8 % — SIGNIFICANT CHANGE UP (ref 20.5–51.1)
MCHC RBC-ENTMCNC: 29.1 PG — SIGNIFICANT CHANGE UP (ref 27–31)
MCHC RBC-ENTMCNC: 29.3 PG — SIGNIFICANT CHANGE UP (ref 27–31)
MCHC RBC-ENTMCNC: 31.7 G/DL — LOW (ref 32–37)
MCHC RBC-ENTMCNC: 32.6 G/DL — SIGNIFICANT CHANGE UP (ref 32–37)
MCV RBC AUTO: 89.9 FL — SIGNIFICANT CHANGE UP (ref 80–94)
MCV RBC AUTO: 91.8 FL — SIGNIFICANT CHANGE UP (ref 80–94)
MONOCYTES # BLD AUTO: 0.56 K/UL — SIGNIFICANT CHANGE UP (ref 0.1–0.6)
MONOCYTES # BLD AUTO: 0.81 K/UL — HIGH (ref 0.1–0.6)
MONOCYTES NFR BLD AUTO: 6.9 % — SIGNIFICANT CHANGE UP (ref 1.7–9.3)
MONOCYTES NFR BLD AUTO: 7.9 % — SIGNIFICANT CHANGE UP (ref 1.7–9.3)
NEUTROPHILS # BLD AUTO: 3.43 K/UL — SIGNIFICANT CHANGE UP (ref 1.4–6.5)
NEUTROPHILS # BLD AUTO: 5.93 K/UL — SIGNIFICANT CHANGE UP (ref 1.4–6.5)
NEUTROPHILS NFR BLD AUTO: 42.6 % — SIGNIFICANT CHANGE UP (ref 42.2–75.2)
NEUTROPHILS NFR BLD AUTO: 58.2 % — SIGNIFICANT CHANGE UP (ref 42.2–75.2)
NRBC # BLD: 0 /100 WBCS — SIGNIFICANT CHANGE UP (ref 0–0)
NRBC # BLD: 0 /100 WBCS — SIGNIFICANT CHANGE UP (ref 0–0)
PLATELET # BLD AUTO: 186 K/UL — SIGNIFICANT CHANGE UP (ref 130–400)
PLATELET # BLD AUTO: 211 K/UL — SIGNIFICANT CHANGE UP (ref 130–400)
POTASSIUM SERPL-MCNC: 3.6 MMOL/L — SIGNIFICANT CHANGE UP (ref 3.5–5)
POTASSIUM SERPL-MCNC: 3.7 MMOL/L — SIGNIFICANT CHANGE UP (ref 3.5–5)
POTASSIUM SERPL-SCNC: 3.6 MMOL/L — SIGNIFICANT CHANGE UP (ref 3.5–5)
POTASSIUM SERPL-SCNC: 3.7 MMOL/L — SIGNIFICANT CHANGE UP (ref 3.5–5)
PROT SERPL-MCNC: 6.4 G/DL — SIGNIFICANT CHANGE UP (ref 6–8)
PROT SERPL-MCNC: 7.3 G/DL — SIGNIFICANT CHANGE UP (ref 6–8)
RBC # BLD: 3.88 M/UL — LOW (ref 4.7–6.1)
RBC # BLD: 4.16 M/UL — LOW (ref 4.7–6.1)
RBC # FLD: 14.2 % — SIGNIFICANT CHANGE UP (ref 11.5–14.5)
RBC # FLD: 14.3 % — SIGNIFICANT CHANGE UP (ref 11.5–14.5)
SALICYLATES SERPL-MCNC: <0.3 MG/DL — LOW (ref 4–30)
SODIUM SERPL-SCNC: 138 MMOL/L — SIGNIFICANT CHANGE UP (ref 135–146)
SODIUM SERPL-SCNC: 141 MMOL/L — SIGNIFICANT CHANGE UP (ref 135–146)
WBC # BLD: 10.19 K/UL — SIGNIFICANT CHANGE UP (ref 4.8–10.8)
WBC # BLD: 8.08 K/UL — SIGNIFICANT CHANGE UP (ref 4.8–10.8)
WBC # FLD AUTO: 10.19 K/UL — SIGNIFICANT CHANGE UP (ref 4.8–10.8)
WBC # FLD AUTO: 8.08 K/UL — SIGNIFICANT CHANGE UP (ref 4.8–10.8)

## 2018-03-26 RX ORDER — NYSTATIN CREAM 100000 [USP'U]/G
1 CREAM TOPICAL THREE TIMES A DAY
Qty: 0 | Refills: 0 | Status: DISCONTINUED | OUTPATIENT
Start: 2018-03-26 | End: 2018-04-05

## 2018-03-26 RX ORDER — DIPHENHYDRAMINE HCL 50 MG
50 CAPSULE ORAL EVERY 6 HOURS
Qty: 0 | Refills: 0 | Status: DISCONTINUED | OUTPATIENT
Start: 2018-03-26 | End: 2018-03-27

## 2018-03-26 RX ORDER — FLUCONAZOLE 150 MG/1
100 TABLET ORAL DAILY
Qty: 0 | Refills: 0 | Status: COMPLETED | OUTPATIENT
Start: 2018-03-26 | End: 2018-04-02

## 2018-03-26 RX ORDER — ACETAMINOPHEN 500 MG
975 TABLET ORAL ONCE
Qty: 0 | Refills: 0 | Status: COMPLETED | OUTPATIENT
Start: 2018-03-26 | End: 2018-03-26

## 2018-03-26 RX ORDER — QUETIAPINE FUMARATE 200 MG/1
50 TABLET, FILM COATED ORAL AT BEDTIME
Qty: 0 | Refills: 0 | Status: DISCONTINUED | OUTPATIENT
Start: 2018-03-26 | End: 2018-03-27

## 2018-03-26 RX ORDER — QUETIAPINE FUMARATE 200 MG/1
150 TABLET, FILM COATED ORAL ONCE
Qty: 0 | Refills: 0 | Status: COMPLETED | OUTPATIENT
Start: 2018-03-26 | End: 2018-03-26

## 2018-03-26 RX ORDER — FLUCONAZOLE 150 MG/1
150 TABLET ORAL DAILY
Qty: 0 | Refills: 0 | Status: DISCONTINUED | OUTPATIENT
Start: 2018-03-26 | End: 2018-03-26

## 2018-03-26 RX ORDER — HALOPERIDOL DECANOATE 100 MG/ML
5 INJECTION INTRAMUSCULAR EVERY 6 HOURS
Qty: 0 | Refills: 0 | Status: DISCONTINUED | OUTPATIENT
Start: 2018-03-26 | End: 2018-03-27

## 2018-03-26 RX ORDER — DIVALPROEX SODIUM 500 MG/1
500 TABLET, DELAYED RELEASE ORAL
Qty: 0 | Refills: 0 | Status: DISCONTINUED | OUTPATIENT
Start: 2018-03-26 | End: 2018-04-02

## 2018-03-26 RX ORDER — MICONAZOLE NITRATE 2 %
1 CREAM (GRAM) TOPICAL
Qty: 0 | Refills: 0 | Status: DISCONTINUED | OUTPATIENT
Start: 2018-03-26 | End: 2018-04-05

## 2018-03-26 RX ADMIN — FLUCONAZOLE 100 MILLIGRAM(S): 150 TABLET ORAL at 20:31

## 2018-03-26 RX ADMIN — NYSTATIN CREAM 1 APPLICATION(S): 100000 CREAM TOPICAL at 20:33

## 2018-03-26 RX ADMIN — DIVALPROEX SODIUM 500 MILLIGRAM(S): 500 TABLET, DELAYED RELEASE ORAL at 06:01

## 2018-03-26 RX ADMIN — QUETIAPINE FUMARATE 150 MILLIGRAM(S): 200 TABLET, FILM COATED ORAL at 21:14

## 2018-03-26 RX ADMIN — Medication 975 MILLIGRAM(S): at 04:22

## 2018-03-26 RX ADMIN — DIVALPROEX SODIUM 500 MILLIGRAM(S): 500 TABLET, DELAYED RELEASE ORAL at 20:31

## 2018-03-26 RX ADMIN — QUETIAPINE FUMARATE 50 MILLIGRAM(S): 200 TABLET, FILM COATED ORAL at 20:38

## 2018-03-26 RX ADMIN — Medication 1 APPLICATION(S): at 20:33

## 2018-03-26 NOTE — ED ADULT NURSE NOTE - OBJECTIVE STATEMENT
pt states he wanted to "jump in front of a train". pt presents to be calm, does not appear to be in distress. placed on 1:1 sit

## 2018-03-26 NOTE — H&P ADULT - NSHPLABSRESULTS_GEN_ALL_CORE
12.2   10.19 )-----------( 211      ( 26 Mar 2018 01:15 )             37.4   03-26    138  |  99  |  16  ----------------------------<  173<H>  3.7   |  26  |  0.8    Ca    9.0      26 Mar 2018 01:15    TPro  7.3  /  Alb  4.3  /  TBili  0.2  /  DBili  <0.2  /  AST  24  /  ALT  22  /  AlkPhos  67  03-26

## 2018-03-26 NOTE — H&P ADULT - NSHPPHYSICALEXAM_GEN_ALL_CORE
Vital Signs Last 24 Hrs  T(C): 36.8 (26 Mar 2018 15:39), Max: 36.8 (26 Mar 2018 15:39)  T(F): 98.3 (26 Mar 2018 15:39), Max: 98.3 (26 Mar 2018 15:39)  HR: 72 (26 Mar 2018 15:39) (68 - 74)  BP: 133/63 (26 Mar 2018 15:39) (116/55 - 133/63)  BP(mean): --  RR: 18 (26 Mar 2018 15:39) (18 - 18)  SpO2: 96% (26 Mar 2018 15:39) (95% - 97%)    PHYSICAL EXAM:      Constitutional: A&Ox4  Eyes: PERRLA, EOM intact  ENMT: no sore throat  Neck: no tenderness  Back: no spinal tenderness  Respiratory: cta b/l  Cardiovascular: s1 s2 rrr  Gastrointestinal: soft nt  nd + bs no rebound or guarding  Genitourinary: no cva tenderenss  Extremities: normal rom, no edema, calf tenderness  Vascular: no cyanosis   Neurological: no focal deficits  Skin: tinea pedis to left foot with erythema and scaling  Lymph Nodes: no lymphadenopathy  Musculoskeletal: no joint swelling  Psychiatric: +depressed mood

## 2018-03-26 NOTE — H&P ADULT - PROBLEM SELECTOR PLAN 3
#HTN  -bp presently acceptable  -low sodium diet  -monitor bp  -pt taking bp meds irregularly consider restart chlorthalidone if elevated

## 2018-03-26 NOTE — H&P ADULT - HISTORY OF PRESENT ILLNESS
Patient is a 51yo M with a history of depression whom presented to the ED with c/o suicidal ideations with a plan to jump in front of the train. patient c/o associated depressed mood, hopeless, helpless, insmonia, and decreased appetite. Patient reports occasional use of marijuana and cocaine. Patient denies HI or AH.

## 2018-03-26 NOTE — ED BEHAVIORAL HEALTH ASSESSMENT NOTE - HPI (INCLUDE ILLNESS QUALITY, SEVERITY, DURATION, TIMING, CONTEXT, MODIFYING FACTORS, ASSOCIATED SIGNS AND SYMPTOMS)
Mr Beny is a 52 year old  man, homeless , alone,  but  , unemployed, used to work in construction, with a history of Bipolar disorder and Polysubstance dependence who presented to the ER for the evaluation of depression and suicidal thoughts.   Patient reports that he does not have any stressors , however he has been feeling increasingly depressed , hopeless, helpless with poor sleep, poor appetite, and thinking life is not worth living. patient reports that he is having thoughts of killing himself however currently has no intention or plan. Patient reports that he does not have a psychiatrist and can not remember the last time, he saw one. he reports that he has been in and out of hospitals  and has been unable to settle down to follow up with a psychiatrist because of his depression. Patient reports that he however takes Depakote 500mg twice a day and Seroquel 200mg at night , last use was 4 days ago. he reports that his medication is currently being prescribed by a Dr Gómez at [facility] . Patient denies having any symptoms of acute jadon or psychosis at this time. He reports that he uses cocaine and marijuana episodically, last use was 2 days ago, $100 worth of cocaine and $20 worth of Marijuana . he denies use of alcohol or other illicit drugs.
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
## 2018-03-26 NOTE — ED PROVIDER NOTE - OBJECTIVE STATEMENT
patient is c/o feeling suicidal, denies any attempts, has a plan to jump in front of the train. patient is c/o feeling suicidal, denies any attempts, has a plan to jump in front of the train. Patient denies any OD on medications, denies any f/c/n/v/cp/sob/abd pain, denies any URI symptoms.   No trauma

## 2018-03-26 NOTE — H&P ADULT - NSHPREVIEWOFSYSTEMS_GEN_ALL_CORE
General:	no fever, weight loss,  chills    Skin: +rash/tinea to left foot  	  Ophthalmologic: no visual changes  	  ENMT:	no sore throat    Respiratory and Thorax: no cough, wheeze,  sob  	  Cardiovascular:	no chest pain, palpitations, dizziness    Gastrointestinal:	no nausea, vomiting, diarrhea, abd pain    Genitourinary:	no dysuria, hematuria    Musculoskeletal:	no joint pains    Neurological:	 no speech disturbance, focal weakness, numbness    Psychiatric:	as above    Hematology/Lymphatics:	no anemia    Endocrine:	no polyuria, polydipsia

## 2018-03-26 NOTE — ED BEHAVIORAL HEALTH ASSESSMENT NOTE - OTHER PAST PSYCHIATRIC HISTORY (INCLUDE DETAILS REGARDING ONSET, COURSE OF ILLNESS, INPATIENT/OUTPATIENT TREATMENT)
Patient reports multiple inpatient psychiatric hospitalizations , and suicide attempts, he reports that his last suicide attempt was  a year ago by overdosing on pills

## 2018-03-26 NOTE — ED ADULT TRIAGE NOTE - CHIEF COMPLAINT QUOTE
patient feels suicidal since yesterday, alert and in no distress. states he was going to jump in front of a train

## 2018-03-26 NOTE — ED BEHAVIORAL HEALTH ASSESSMENT NOTE - SUMMARY
Mr Swan is a 52 year old man with a history of Bipolar disorder and Polysubstance dependence who presented to the ED for the evaluation of depressed mood and suicidal ideations.  Patient appears to have decompensated primarily because of non compliance with medication and continued use of illict drugs. Although patient does not admit to having any stressors , his being homeless and unemployed may be contributing to his depressed mood and also his inability to comply with psychiatric follow up.

## 2018-03-26 NOTE — ED BEHAVIORAL HEALTH ASSESSMENT NOTE - DESCRIPTION
Patient was calm and cooperative , not agitated Patient reports hypertension and fungal infection of the foot Patient reports that he was in a group home for 4 years growing up. he reports that he went as far as the 12 grade in school

## 2018-03-26 NOTE — ED BEHAVIORAL HEALTH ASSESSMENT NOTE - RISK ASSESSMENT
At this time, patient is considered a danger to himself and needs inpatient psychiatric hospitalization for mood stabilization and medication management. Patient will be re-started on Depakote 500mg P.o BID for mood stabilization and Seroquel 50mg at bedtime . Patient was informed and agreeable to the paln

## 2018-03-26 NOTE — ED BEHAVIORAL HEALTH ASSESSMENT NOTE - AXIS IV
Problems with primary support/Problem related to social environment/Housing problems/Occupational problems/Economic problems/Problems with access to healthcare services/Problems with interaction with legal system/Educational problems

## 2018-03-26 NOTE — ED PROVIDER NOTE - PROGRESS NOTE DETAILS
Discussed with psychiatrist, will evaluate patient. Patient remained stable in ED, Patient was seen by psychiatrist and as recommended is admitted to [facility], pending bed availability. Patient remained stable in ED. Is admitted to [facility], I have called [facility], as per them, they have no IPP bed available at this time, recommended to call back later, unknown at this time if they have any beds available this morning. s/o Dr. Buckley, awaiting bed

## 2018-03-26 NOTE — ED BEHAVIORAL HEALTH ASSESSMENT NOTE - DESCRIPTION (FIRST USE, LAST USE, QUANTITY, FREQUENCY, DURATION)
patient reports episodic use, but reports multiple rehab admissions stimulants, patient reports episodic use, but reports multiple rehab admissions

## 2018-03-27 LAB
APPEARANCE UR: (no result)
BACTERIA # UR AUTO: (no result)
BILIRUB UR-MCNC: NEGATIVE — SIGNIFICANT CHANGE UP
CHOLEST SERPL-MCNC: 173 MG/DL — SIGNIFICANT CHANGE UP (ref 100–200)
COD CRY URNS QL: NEGATIVE — SIGNIFICANT CHANGE UP
COLOR SPEC: YELLOW — SIGNIFICANT CHANGE UP
DIFF PNL FLD: NEGATIVE — SIGNIFICANT CHANGE UP
EPI CELLS # UR: (no result) /HPF
GLUCOSE UR QL: NEGATIVE MG/DL — SIGNIFICANT CHANGE UP
GRAN CASTS # UR COMP ASSIST: NEGATIVE — SIGNIFICANT CHANGE UP
HDLC SERPL-MCNC: 40 MG/DL — SIGNIFICANT CHANGE UP (ref 40–125)
HYALINE CASTS # UR AUTO: NEGATIVE — SIGNIFICANT CHANGE UP
KETONES UR-MCNC: NEGATIVE — SIGNIFICANT CHANGE UP
LEUKOCYTE ESTERASE UR-ACNC: NEGATIVE — SIGNIFICANT CHANGE UP
LIPID PNL WITH DIRECT LDL SERPL: 128 MG/DL — SIGNIFICANT CHANGE UP (ref 4–129)
NITRITE UR-MCNC: NEGATIVE — SIGNIFICANT CHANGE UP
PH UR: 6 — SIGNIFICANT CHANGE UP (ref 5–8)
PROT UR-MCNC: NEGATIVE MG/DL — SIGNIFICANT CHANGE UP
RBC CASTS # UR COMP ASSIST: NEGATIVE — SIGNIFICANT CHANGE UP
SP GR SPEC: >=1.03 (ref 1.01–1.03)
TOTAL CHOLESTEROL/HDL RATIO MEASUREMENT: 4.3 RATIO — SIGNIFICANT CHANGE UP (ref 4–5.5)
TRI-PHOS CRY UR QL COMP ASSIST: NEGATIVE — SIGNIFICANT CHANGE UP
TRIGL SERPL-MCNC: 77 MG/DL — SIGNIFICANT CHANGE UP (ref 10–149)
TSH SERPL-MCNC: 1.32 UIU/ML — SIGNIFICANT CHANGE UP (ref 0.27–4.2)
URATE CRY FLD QL MICRO: NEGATIVE — SIGNIFICANT CHANGE UP
UROBILINOGEN FLD QL: 0.2 MG/DL — SIGNIFICANT CHANGE UP (ref 0.2–0.2)
VALPROATE SERPL-MCNC: 29 UG/ML — LOW (ref 50–100)
WBC UR QL: SIGNIFICANT CHANGE UP /HPF

## 2018-03-27 RX ORDER — HYDROXYZINE HCL 10 MG
50 TABLET ORAL EVERY 6 HOURS
Qty: 0 | Refills: 0 | Status: DISCONTINUED | OUTPATIENT
Start: 2018-03-27 | End: 2018-04-05

## 2018-03-27 RX ORDER — IBUPROFEN 200 MG
400 TABLET ORAL EVERY 8 HOURS
Qty: 0 | Refills: 0 | Status: DISCONTINUED | OUTPATIENT
Start: 2018-03-27 | End: 2018-04-05

## 2018-03-27 RX ORDER — QUETIAPINE FUMARATE 200 MG/1
100 TABLET, FILM COATED ORAL AT BEDTIME
Qty: 0 | Refills: 0 | Status: DISCONTINUED | OUTPATIENT
Start: 2018-03-27 | End: 2018-03-28

## 2018-03-27 RX ORDER — QUETIAPINE FUMARATE 200 MG/1
100 TABLET, FILM COATED ORAL ONCE
Qty: 0 | Refills: 0 | Status: COMPLETED | OUTPATIENT
Start: 2018-03-27 | End: 2018-03-27

## 2018-03-27 RX ORDER — HALOPERIDOL DECANOATE 100 MG/ML
5 INJECTION INTRAMUSCULAR EVERY 6 HOURS
Qty: 0 | Refills: 0 | Status: DISCONTINUED | OUTPATIENT
Start: 2018-03-27 | End: 2018-04-05

## 2018-03-27 RX ORDER — DIPHENHYDRAMINE HCL 50 MG
50 CAPSULE ORAL EVERY 6 HOURS
Qty: 0 | Refills: 0 | Status: DISCONTINUED | OUTPATIENT
Start: 2018-03-27 | End: 2018-04-05

## 2018-03-27 RX ADMIN — DIVALPROEX SODIUM 500 MILLIGRAM(S): 500 TABLET, DELAYED RELEASE ORAL at 10:24

## 2018-03-27 RX ADMIN — QUETIAPINE FUMARATE 100 MILLIGRAM(S): 200 TABLET, FILM COATED ORAL at 20:30

## 2018-03-27 RX ADMIN — DIVALPROEX SODIUM 500 MILLIGRAM(S): 500 TABLET, DELAYED RELEASE ORAL at 20:28

## 2018-03-27 RX ADMIN — QUETIAPINE FUMARATE 100 MILLIGRAM(S): 200 TABLET, FILM COATED ORAL at 22:00

## 2018-03-27 RX ADMIN — Medication 400 MILLIGRAM(S): at 18:16

## 2018-03-27 RX ADMIN — NYSTATIN CREAM 1 APPLICATION(S): 100000 CREAM TOPICAL at 12:07

## 2018-03-27 RX ADMIN — Medication 1 APPLICATION(S): at 09:08

## 2018-03-27 RX ADMIN — FLUCONAZOLE 100 MILLIGRAM(S): 150 TABLET ORAL at 10:25

## 2018-03-27 RX ADMIN — Medication 1 APPLICATION(S): at 20:32

## 2018-03-27 RX ADMIN — NYSTATIN CREAM 1 APPLICATION(S): 100000 CREAM TOPICAL at 09:07

## 2018-03-27 RX ADMIN — Medication 50 MILLIGRAM(S): at 20:29

## 2018-03-27 RX ADMIN — NYSTATIN CREAM 1 APPLICATION(S): 100000 CREAM TOPICAL at 20:29

## 2018-03-27 NOTE — PROGRESS NOTE BEHAVIORAL HEALTH - SUMMARY
52 year old  man, homeless, alone, 4-5 suicide attempts marked by taking pills of cutting wrists, last suicide attempt was last year resulting in hospitalization. Denies current or manic psychotic symptoms.  but , non compliant with outpatient treatment, multiple [facility]PP admissions (last one last year via cutting),  unemployed, used to work in construction, with a history of Bipolar disorder and cocaine/MJ use disorder who presented to the ER for the evaluation of depression and suicidal thoughts. Patient was admitted on 9.13 status.     Patient meets criteria for cocaine/marijuana induced mood disorder and bipolar depression vs. MDD, cocaine/marijuana use disorder severe. Patient will require continued hospitalization for ensure safety and stabilization.     1) Cocaine/marijuana induced mood disorder and bipolar depression vs. MDD: Continue Depakote 500mg BID. Will increase Seroquel to 100mg QHS to target bipolar depression. Will hold SSRIs at this time until collateral obtained from prior psychiatrist about patient's psych hx.    2) PRNS: Haldol 5mg PO, Benadryl 50mg PO prn agitation (combative). Hydroxyzine: 50mg prn anxiety    3) Medical: will follow PA reccs.     4) Cocaine/marijuna use disorder: Will consider inpatient rehab. 52 year old  man, homeless, alone, 4-5 suicide attempts marked by taking pills of cutting wrists, last suicide attempt was last year resulting in hospitalization. Denies current or manic psychotic symptoms.  but , non compliant with outpatient treatment, multiple [facility] admissions (last one last year via cutting),  unemployed, used to work in construction, with a history of Bipolar disorder and cocaine/MJ use disorder who presented to the ER for the evaluation of depression and suicidal thoughts. Patient was admitted on 9.13 status.     Patient meets criteria for cocaine/marijuana induced mood disorder and bipolar depression vs. MDD in partial remission, cocaine/marijuana use disorder severe. Patient will require continued hospitalization for ensure safety, observation and stabilization.     1) Cocaine/marijuana induced mood disorder and bipolar depression vs. MDD: Continue Depakote 500mg BID. Will increase Seroquel to 100mg QHS to target bipolar depression which currently appears to be in partial remission. Will hold SSRIs at this time until collateral obtained from prior psychiatrist about patient's psych hx.    2) PRNS: Haldol 5mg PO, Benadryl 50mg PO prn agitation (combative). Hydroxyzine: 50mg prn anxiety    3) Medical: will follow PA reccs.     4) Cocaine/marijuna use disorder: Will consider inpatient rehab.

## 2018-03-27 NOTE — PROGRESS NOTE BEHAVIORAL HEALTH - NSBHFUPINTERVALHXFT_PSY_A_CORE
52 year old  man, homeless, alone, 4-5 suicide attempts marked by taking pills of cutting wrists, last suicide attempt was last year resulting in hospitalization. Denies current or manic psychotic symptoms.  but , non compliant with outpatient treatment, multiple [facility] admissions (last one last year via cutting),  unemployed, used to work in construction, with a history of Bipolar disorder and cocaine/MJ/ETOH who presented to the ER for the evaluation of depression and suicidal thoughts. Patient was admitted on 9.13 status.   On interview patient states that he has been depressed for the 2-3 days. His associated symptoms have been poor sleep, decreased appetite and suicidal thoughts with plan to jump in front of a train/car. He states that he has been homeless and went to his friend's hospital in  who wasn't there. This prompted his depression, because he had nowhere to live, so he presented himself to the ED. He reports anxiety about being homeless. Denies manic or psychotic symptoms.   Past Psych Hx: Patient reports that he has been in outpatient tx with Dr. Moura at [facility]. He has been non compliant with Depakote 500mg BID and Seroquel 200mg QHS. He is unsure of prior trials, but states that he has been on various SSRIs. He has had multiple suicide attempts last one last year via cutting wrists resulting in hospitalization. ED record states that it was via OD. Patient has had multiple IPP admissions, last one being last year. Patient states that he has a history of Bipolar disorder, when asked about prior manic episodes he reports decreased need for sleep and increased energy without irritability or euphoria. He is unclear about this being substance induced or otherwise.   Substance Hx: Marijuana 2 blunts daily, last use 3/23, Cocaine $100 dollars worth last use 3/23, Denies alcohol use. History rehabs/detox  Social Hx: Patient reports that he was in a group home for 4 years growing up. he reports that he went as far as the 12 grade in school. Currently homeless.  but .   Legal Hx: Hx of incarceration for 40 days at [facility] for "quinn crime" (per patient and chart) 52 year old  man, homeless, alone, 4-5 suicide attempts marked by taking pills of cutting wrists, last suicide attempt was last year resulting in hospitalization. Denies current or manic psychotic symptoms.  but , non compliant with outpatient treatment, multiple [facility] admissions (last one last year via cutting),  unemployed, used to work in construction, with a history of Bipolar disorder and cocaine/MJ/ETOH who presented to the ER for the evaluation of depression and suicidal thoughts. Patient was admitted on 9.13 status.   On interview patient states that he has been depressed for the 2-3 days. His associated symptoms have been poor sleep, decreased appetite and suicidal thoughts with plan to jump in front of a train/car prior to his presentation to the hospital. He states that he has been homeless and went to his friend's hospital in  who wasn't there. This prompted his depression and aforementioned symptoms, because he had nowhere to live, so he presented himself to the ED. He reports anxiety about being homeless. Denies manic or psychotic symptoms. He currently denies suicidality. Currently he reports depressed mood and poor appetite, but denies other associated symptoms.   Past Psych Hx: Patient reports that he has been in outpatient tx with Dr. Moura at [facility]. He has been non compliant with Depakote 500mg BID and Seroquel 200mg QHS. He is unsure of prior trials, but states that he has been on various SSRIs. He has had multiple suicide attempts last one last year via cutting wrists resulting in hospitalization. ED record states that it was via OD. Patient has had multiple [facility] admissions, last one being last year. Patient states that he has a history of Bipolar disorder, when asked about prior manic episodes he reports decreased need for sleep and increased energy without irritability or euphoria. He is unclear about this being substance induced or otherwise.   Substance Hx: Marijuana 2 blunts daily, last use 3/23, Cocaine $100 dollars worth last use 3/23, Denies alcohol use. History rehabs/detox  Social Hx: Patient reports that he was in a group home for 4 years growing up. he reports that he went as far as the 12 grade in school. Currently homeless.  but .   Legal Hx: Hx of incarceration for 40 days at [facility] for "quinn crime" (per patient and chart)

## 2018-03-27 NOTE — PROGRESS NOTE BEHAVIORAL HEALTH - NSBHCHARTREVIEWVS_PSY_A_CORE FT
Vital Signs Last 24 Hrs  T(C): 36.3 (27 Mar 2018 10:30), Max: 36.9 (26 Mar 2018 18:45)  T(F): 97.4 (27 Mar 2018 10:30), Max: 98.4 (26 Mar 2018 18:45)  HR: 73 (27 Mar 2018 10:30) (62 - 73)  BP: 120/61 (27 Mar 2018 10:30) (120/61 - 141/69)  BP(mean): --  RR: 17 (27 Mar 2018 10:30) (17 - 18)  SpO2: 96% (26 Mar 2018 15:39) (96% - 96%)  03-26        MEDICATIONS  (STANDING):  diVALproex  milliGRAM(s) Oral two times a day  fluconAZOLE   Tablet 100 milliGRAM(s) Oral daily  miconazole 2% Cream 1 Application(s) Topical two times a day  nystatin Powder 1 Application(s) Topical three times a day  QUEtiapine 50 milliGRAM(s) Oral at bedtime    MEDICATIONS  (PRN):  diphenhydrAMINE   Injectable 50 milliGRAM(s) IntraMuscular every 6 hours PRN Agitation  haloperidol    Injectable 5 milliGRAM(s) IntraMuscular every 6 hours PRN Agitation  LORazepam   Injectable 2 milliGRAM(s) IntraMuscular every 6 hours PRN Agitation

## 2018-03-28 RX ORDER — SOD,AMMONIUM,POTASSIUM LACTATE
1 CREAM (GRAM) TOPICAL
Qty: 0 | Refills: 0 | Status: DISCONTINUED | OUTPATIENT
Start: 2018-03-28 | End: 2018-04-05

## 2018-03-28 RX ORDER — QUETIAPINE FUMARATE 200 MG/1
150 TABLET, FILM COATED ORAL AT BEDTIME
Qty: 0 | Refills: 0 | Status: DISCONTINUED | OUTPATIENT
Start: 2018-03-28 | End: 2018-03-30

## 2018-03-28 RX ADMIN — DIVALPROEX SODIUM 500 MILLIGRAM(S): 500 TABLET, DELAYED RELEASE ORAL at 21:18

## 2018-03-28 RX ADMIN — Medication 1 APPLICATION(S): at 08:41

## 2018-03-28 RX ADMIN — NYSTATIN CREAM 1 APPLICATION(S): 100000 CREAM TOPICAL at 12:49

## 2018-03-28 RX ADMIN — Medication 400 MILLIGRAM(S): at 21:31

## 2018-03-28 RX ADMIN — NYSTATIN CREAM 1 APPLICATION(S): 100000 CREAM TOPICAL at 08:40

## 2018-03-28 RX ADMIN — Medication 1 APPLICATION(S): at 21:17

## 2018-03-28 RX ADMIN — Medication 400 MILLIGRAM(S): at 21:35

## 2018-03-28 RX ADMIN — DIVALPROEX SODIUM 500 MILLIGRAM(S): 500 TABLET, DELAYED RELEASE ORAL at 08:39

## 2018-03-28 RX ADMIN — QUETIAPINE FUMARATE 150 MILLIGRAM(S): 200 TABLET, FILM COATED ORAL at 21:18

## 2018-03-28 NOTE — PROGRESS NOTE BEHAVIORAL HEALTH - NSBHCHARTREVIEWVS_PSY_A_CORE FT
Vital Signs Last 24 Hrs  T(C): 36.5 (28 Mar 2018 10:17), Max: 36.5 (28 Mar 2018 10:17)  T(F): 97.7 (28 Mar 2018 10:17), Max: 97.7 (28 Mar 2018 10:17)  HR: 60 (28 Mar 2018 05:59) (60 - 89)  BP: 159/63 (28 Mar 2018 10:17) (128/66 - 165/80)  BP(mean): --  RR: 18 (28 Mar 2018 10:17) (16 - 18)  SpO2: --  03-26          Urinalysis Basic - ( 27 Mar 2018 07:21 )    Color: Yellow / Appearance: Slightly Cloudy / SG: >=1.030 / pH: x  Gluc: x / Ketone: Negative  / Bili: Negative / Urobili: 0.2 mg/dL   Blood: x / Protein: Negative mg/dL / Nitrite: Negative   Leuk Esterase: Negative / RBC: Negative / WBC 3-5 /HPF   Sq Epi: x / Non Sq Epi: Few /HPF / Bacteria: Few      MEDICATIONS  (STANDING):  diVALproex  milliGRAM(s) Oral two times a day  fluconAZOLE   Tablet 100 milliGRAM(s) Oral daily  miconazole 2% Cream 1 Application(s) Topical two times a day  nystatin Powder 1 Application(s) Topical three times a day  QUEtiapine 150 milliGRAM(s) Oral at bedtime    MEDICATIONS  (PRN):  diphenhydrAMINE   Capsule 50 milliGRAM(s) Oral every 6 hours PRN agitation  haloperidol     Tablet 5 milliGRAM(s) Oral every 6 hours PRN agitation  hydrOXYzine hydrochloride 50 milliGRAM(s) Oral every 6 hours PRN Anxiety  ibuprofen  Tablet 400 milliGRAM(s) Oral every 8 hours PRN mod pain

## 2018-03-28 NOTE — PROGRESS NOTE BEHAVIORAL HEALTH - NSBHCHARTREVIEWLAB_PSY_A_CORE FT
141  |  100  |  20  ----------------------------<  127<H>  3.6   |  28  |  0.7    Ca    8.4<L>      26 Mar 2018 22:07    TPro  6.4  /  Alb  3.9  /  TBili  0.2  /  DBili  x   /  AST  15  /  ALT  17  /  AlkPhos  56  03-26 03-26    141  |  100  |  20  ----------------------------<  127<H>  3.6   |  28  |  0.7    Ca    8.4<L>      26 Mar 2018 22:07    TPro  6.4  /  Alb  3.9  /  TBili  0.2  /  DBili  x   /  AST  15  /  ALT  17  /  AlkPhos  56  03-26      Urinalysis Basic - ( 27 Mar 2018 07:21 )    Color: Yellow / Appearance: Slightly Cloudy / SG: >=1.030 / pH: x  Gluc: x / Ketone: Negative  / Bili: Negative / Urobili: 0.2 mg/dL   Blood: x / Protein: Negative mg/dL / Nitrite: Negative   Leuk Esterase: Negative / RBC: x / WBC x   Sq Epi: x / Non Sq Epi: x / Bacteria: x
141  |  100  |  20  ----------------------------<  127<H>  3.6   |  28  |  0.7    Ca    8.4<L>      26 Mar 2018 22:07    TPro  6.4  /  Alb  3.9  /  TBili  0.2  /  DBili  x   /  AST  15  /  ALT  17  /  AlkPhos  56  03-26 03-26    141  |  100  |  20  ----------------------------<  127<H>  3.6   |  28  |  0.7    Ca    8.4<L>      26 Mar 2018 22:07    TPro  6.4  /  Alb  3.9  /  TBili  0.2  /  DBili  x   /  AST  15  /  ALT  17  /  AlkPhos  56  03-26

## 2018-03-28 NOTE — PROGRESS NOTE BEHAVIORAL HEALTH - SUMMARY
52 year old  man, homeless, alone, 4-5 suicide attempts marked by taking pills of cutting wrists, last suicide attempt was last year resulting in hospitalization. Denies current or manic psychotic symptoms.  but , non compliant with outpatient treatment, multiple [facility] admissions (last one last year via cutting),  unemployed, used to work in construction, with a history of Bipolar disorder and cocaine/MJ use disorder who presented to the ER for the evaluation of depression and suicidal thoughts. Patient was admitted on 9.13 status.     Patient meets criteria for cocaine/marijuana induced mood disorder vs. bipolar depression vs. MDD along with cocaine/marijuana use disorder severe. Patient remains hopeless and anhedonic. Patient will require continued hospitalization for ensure safety, observation and stabilization.     1) Cocaine/marijuana induced mood disorder and bipolar depression vs. MDD: Continue Depakote 500mg BID. Will increase Seroquel to 150mg QHS to target bipolar depression.     2) PRNS: Haldol 5mg PO, Benadryl 50mg PO prn agitation (combative). Hydroxyzine: 50mg prn anxiety    3) Medical: will follow [facility]. podiatry consult ordered for fungal lesions.     4) Cocaine/marijuna use disorder: Will consider inpatient rehab.

## 2018-03-28 NOTE — PROGRESS NOTE BEHAVIORAL HEALTH - NSBHFUPINTERVALHXFT_PSY_A_CORE
Patient evaluated individually. When asked about depressive symptoms, he states, "I feel hopeless" When asked about associated symptoms of depression he states, "I need to get my life back together" When asked about suicidality patient does not answer. Denies psychotic or manic symptoms. When asked about interest in engaging in treatment for his cocaine/marijuana use, patient states, "I don't know right now" Alert and oriented x 3. Denies side effects to medication including but not limited to excessive sedation on quetiapine+depakote. Nursing reports that patient is not participating in milieu therapy.

## 2018-03-28 NOTE — CONSULT NOTE ADULT - SUBJECTIVE AND OBJECTIVE BOX
PODIATRY CONSULT   CHINMAY SPRING is a pleasant well-nourished, well developed 52y Male in no acute distress, alert awake, and oriented to person, place and time.   Patient is a 52y old  Male who presents with a chief complaint of left foot pain.     HPI:  Patient is a 53yo M with a history of depression whom presented to the ED with c/o suicidal ideations with a plan to jump in front of the train. patient c/o associated depressed mood, hopeless, helpless, insmonia, and decreased appetite. Patient reports occasional use of marijuana and cocaine. Patient denies HI or AH. (26 Mar 2018 18:08)    Podiatry consulted for the Left foot pain.     PAST MEDICAL & SURGICAL HISTORY:  Tinea pedis of left foot  Essential hypertension  Depression  No significant past surgical history    MEDICATIONS  (STANDING):  diVALproex  milliGRAM(s) Oral two times a day  fluconAZOLE   Tablet 100 milliGRAM(s) Oral daily  miconazole 2% Cream 1 Application(s) Topical two times a day  nystatin Powder 1 Application(s) Topical three times a day  QUEtiapine 150 milliGRAM(s) Oral at bedtime    MEDICATIONS  (PRN):  diphenhydrAMINE   Capsule 50 milliGRAM(s) Oral every 6 hours PRN agitation  haloperidol     Tablet 5 milliGRAM(s) Oral every 6 hours PRN agitation  hydrOXYzine hydrochloride 50 milliGRAM(s) Oral every 6 hours PRN Anxiety  ibuprofen  Tablet 400 milliGRAM(s) Oral every 8 hours PRN mod pain    FAMILY HISTORY:  No pertinent family history in first degree relatives    Vital Signs Last 24 Hrs  T(C): 36.5 (28 Mar 2018 10:17), Max: 36.5 (28 Mar 2018 10:17)  T(F): 97.7 (28 Mar 2018 10:17), Max: 97.7 (28 Mar 2018 10:17)  HR: 60 (28 Mar 2018 05:59) (60 - 89)  BP: 159/63 (28 Mar 2018 10:17) (128/66 - 165/80)  RR: 18 (28 Mar 2018 10:17) (16 - 18)              11.3   8.08  )-----------( 186      ( 26 Mar 2018 22:07 )             35.6     03-26  141  |  100  |  20  ----------------------------<  127<H>  3.6   |  28  |  0.7    Ca    8.4<L>      26 Mar 2018 22:07  TPro  6.4  /  Alb  3.9  /  TBili  0.2  /  DBili  x   /  AST  15  /  ALT  17  /  AlkPhos  56  03-26    Urinalysis Basic - ( 27 Mar 2018 07:21 )  Color: Yellow / Appearance: Slightly Cloudy / SG: >=1.030 / pH: x  Gluc: x / Ketone: Negative  / Bili: Negative / Urobili: 0.2 mg/dL   Blood: x / Protein: Negative mg/dL / Nitrite: Negative   Leuk Esterase: Negative / RBC: Negative / WBC 3-5 /HPF   Sq Epi: x / Non Sq Epi: Few /HPF / Bacteria: Few    PHYSICAL EXAM  LE Focused examination conducted:    DERM:  Extensive skin breaks in the left foot notably the plantar left foot and interdigital maceration to the left interspaces. With the left foot the IDM does not probe into the ST interspace. Right foot with xerosis and changes consistent with tinea pedis. No IDM of the Right foot.   VASC:   Dorsalis Pedis palpable bilaterally   Posterior Tibial palpable bilaterally  Capillary re-fill time less than 3 seconds digits 1-5 bilateral   Temperature gradient: Right Warm to cool. ; Left: warm to cool.  Edema: Right & Left  none   NEURO: Protective sensation intact to the level of the digits bilateral.  ORTHO: Muscle strength 5/5 all major muscle groups bilateral. No structural abnormality, bilaterally    A:  Tinea Pedis R>L  Left foot plantar foot fissures on the midfoot   Left foot IDM  Right foot fissures     P:  Rx: Clotrimazole  Rx: Ammonium Lactate  Ordered: FXR Left foot   Ordered: DARCO shoes bilaterally.   Attending updated and added to note as cosigner.   Podiatry to follow up.    03-28-18 @ 16:02
CHINMAY SPRING  52y  Male      Patient is a 52y old  Male who presents with a chief complaint of   HPI:  Patient is a 53yo M with a history of depression whom presented to the ED with c/o suicidal ideations with a plan to jump in front of the train. patient c/o associated depressed mood, hopeless, helpless, insmonia, and decreased appetite. Patient reports occasional use of marijuana and cocaine. Patient denies HI or AH. (26 Mar 2018 18:08)    INTERVAL HPI/OVERNIGHT EVENTS:  HEALTH ISSUES - PROBLEM Dx:  Essential hypertension: Essential hypertension  Tinea pedis of left foot: Tinea pedis of left foot  Depression, unspecified depression type: Depression, unspecified depression type  Polysubstance (excluding opioids) dependence  Bipolar disorder, current episode depressed, severe, without psychotic features    MEDICATIONS  (STANDING):  diVALproex  milliGRAM(s) Oral two times a day  fluconAZOLE   Tablet 100 milliGRAM(s) Oral daily  miconazole 2% Cream 1 Application(s) Topical two times a day  nystatin Powder 1 Application(s) Topical three times a day  QUEtiapine 100 milliGRAM(s) Oral at bedtime    MEDICATIONS  (PRN):  diphenhydrAMINE   Capsule 50 milliGRAM(s) Oral every 6 hours PRN agitation  haloperidol     Tablet 5 milliGRAM(s) Oral every 6 hours PRN agitation  hydrOXYzine hydrochloride 50 milliGRAM(s) Oral every 6 hours PRN Anxiety  ibuprofen  Tablet 400 milliGRAM(s) Oral every 8 hours PRN mod pain  Allergies    fish (Other)  No Known Drug Allergies  Seafood (Unknown)    Intolerances        PAST MEDICAL & SURGICAL HISTORY:  Tinea pedis of left foot  Essential hypertension  Depression  No significant past surgical history    FAMILY HISTORY:  No pertinent family history in first degree relatives        REVIEW OF SYSTEMS:  CONSTITUTIONAL: No fever, weight loss, or fatigue  EYES: No eye pain, visual disturbances, or discharge  ENMT:  No difficulty hearing, tinnitus, vertigo; No sinus or throat pain  NECK: No pain or stiffness  BREASTS: No pain, masses, or nipple discharge  RESPIRATORY: No cough, wheezing, chills or hemoptysis; No shortness of breath  CARDIOVASCULAR: No chest pain, palpitations, dizziness, or leg swelling  GASTROINTESTINAL: No abdominal or epigastric pain. No nausea, vomiting, or hematemesis; No diarrhea or constipation. No melena or hematochezia.  GENITOURINARY: No dysuria, frequency, hematuria, or incontinence  NEUROLOGICAL: No headaches, memory loss, loss of strength, numbness, or tremors  SKIN: No itching, burning, rashes, or lesions   LYMPH NODES: No enlarged glands  ENDOCRINE: No heat or cold intolerance; No hair loss  MUSCULOSKELETAL: No joint pain or swelling; No muscle, back, or extremity pain  PSYCHIATRIC: No depression, anxiety, mood swings, or difficulty sleeping  HEME/LYMPH: No easy bruising, or bleeding gums  ALLERY AND IMMUNOLOGIC: No hives or eczema    T(C): 36.3 (03-28-18 @ 05:59), Max: 36.3 (03-27-18 @ 10:30)  HR: 60 (03-28-18 @ 05:59) (60 - 89)  BP: 128/66 (03-28-18 @ 05:59) (120/61 - 165/80)  RR: 16 (03-28-18 @ 05:59) (16 - 18)  SpO2: --  Wt(kg): --Vital Signs Last 24 Hrs  T(C): 36.3 (28 Mar 2018 05:59), Max: 36.3 (27 Mar 2018 10:30)  T(F): 97.4 (28 Mar 2018 05:59), Max: 97.4 (27 Mar 2018 10:30)  HR: 60 (28 Mar 2018 05:59) (60 - 89)  BP: 128/66 (28 Mar 2018 05:59) (120/61 - 165/80)  BP(mean): --  RR: 16 (28 Mar 2018 05:59) (16 - 18)  SpO2: --    PHYSICAL EXAM:  GENERAL: NAD, well-groomed, well-developed  HEAD:  Atraumatic, Normocephalic  EYES: EOMI, PERRLA, conjunctiva and sclera clear  ENMT: No tonsillar erythema, exudates, or enlargement; Moist mucous membranes, Good dentition, No lesions  NECK: Supple, No JVD, Normal thyroid  NERVOUS SYSTEM:  Alert & Oriented X3, Good concentration; Motor Strength 5/5 B/L upper and lower extremities; DTRs 2+ intact and symmetric  CHEST/LUNG: Clear to percussion bilaterally; No rales, rhonchi, wheezing, or rubs  HEART: Regular rate and rhythm; No murmurs, rubs, or gallops  ABDOMEN: Soft, Nontender, Nondistended; Bowel sounds present  EXTREMITIES:  2+ Peripheral Pulses, No clubbing, cyanosis, or edema  LYMPH: No lymphadenopathy noted  SKIN: No rashes or lesions    Consultant(s) Notes Reviewed:  [x ] YES  [ ] NO  Care Discussed with Consultants/Other Providers [ x] YES  [ ] NO    LABS:                        11.3   8.08  )-----------( 186      ( 26 Mar 2018 22:07 )             35.6     03-26    141  |  100  |  20  ----------------------------<  127<H>  3.6   |  28  |  0.7    Ca    8.4<L>      26 Mar 2018 22:07    TPro  6.4  /  Alb  3.9  /  TBili  0.2  /  DBili  x   /  AST  15  /  ALT  17  /  AlkPhos  56  03-26      Urinalysis Basic - ( 27 Mar 2018 07:21 )    Color: Yellow / Appearance: Slightly Cloudy / SG: >=1.030 / pH: x  Gluc: x / Ketone: Negative  / Bili: Negative / Urobili: 0.2 mg/dL   Blood: x / Protein: Negative mg/dL / Nitrite: Negative   Leuk Esterase: Negative / RBC: Negative / WBC 3-5 /HPF   Sq Epi: x / Non Sq Epi: Few /HPF / Bacteria: Few      CAPILLARY BLOOD GLUCOSE            Urinalysis Basic - ( 27 Mar 2018 07:21 )    Color: Yellow / Appearance: Slightly Cloudy / SG: >=1.030 / pH: x  Gluc: x / Ketone: Negative  / Bili: Negative / Urobili: 0.2 mg/dL   Blood: x / Protein: Negative mg/dL / Nitrite: Negative   Leuk Esterase: Negative / RBC: Negative / WBC 3-5 /HPF   Sq Epi: x / Non Sq Epi: Few /HPF / Bacteria: Few        RADIOLOGY & ADDITIONAL TESTS:    Imaging Personally Reviewed:  [ ] YES  [ ] NO

## 2018-03-29 RX ADMIN — DIVALPROEX SODIUM 500 MILLIGRAM(S): 500 TABLET, DELAYED RELEASE ORAL at 20:28

## 2018-03-29 RX ADMIN — Medication 1 APPLICATION(S): at 08:02

## 2018-03-29 RX ADMIN — Medication 1 APPLICATION(S): at 20:27

## 2018-03-29 RX ADMIN — NYSTATIN CREAM 1 APPLICATION(S): 100000 CREAM TOPICAL at 20:29

## 2018-03-29 RX ADMIN — FLUCONAZOLE 100 MILLIGRAM(S): 150 TABLET ORAL at 08:03

## 2018-03-29 RX ADMIN — Medication 400 MILLIGRAM(S): at 04:09

## 2018-03-29 RX ADMIN — QUETIAPINE FUMARATE 150 MILLIGRAM(S): 200 TABLET, FILM COATED ORAL at 20:29

## 2018-03-29 RX ADMIN — DIVALPROEX SODIUM 500 MILLIGRAM(S): 500 TABLET, DELAYED RELEASE ORAL at 08:03

## 2018-03-29 RX ADMIN — Medication 1 APPLICATION(S): at 20:29

## 2018-03-29 NOTE — PROGRESS NOTE BEHAVIORAL HEALTH - NSBHFUPINTERVALHXFT_PSY_A_CORE
patient evaluated in treatment team meeting. No acute events overnight, patient got tylenol yesterday for hip pain patient evaluated in treatment team meeting. No acute events overnight, patient got tylenol yesterday for hip pain. He continues to endorse low mood, but denies active SI. He is visible on the unit, and participates in activies. patient evaluated in treatment team meeting. No acute events overnight, patient got tylenol yesterday for hip pain. He continues to endorse low mood, but denies active SI. He is visible on the unit, and participates in activities. patient evaluated in treatment team meeting. No acute events overnight, patient got tylenol yesterday for hip pain. He continues to endorse low mood, but denies active SI. He is visible on the unit, and participates in activities. He reports that his goal is to put his life together and his strength is praying to God. He denies side effects from the seroquel and depakote, including but not limited to GI side effects, EPS, drowsiness. He expresses interest in [facility] for substance abuse.

## 2018-03-29 NOTE — PROGRESS NOTE BEHAVIORAL HEALTH - SUMMARY
52 year old  man, homeless, alone, 4-5 suicide attempts marked by taking pills of cutting wrists, last suicide attempt was last year resulting in hospitalization. Denies current or manic psychotic symptoms.  but , non compliant with outpatient treatment, multiple IPP admissions (last one last year via cutting),  unemployed, used to work in construction, with a history of Bipolar disorder and cocaine/MJ use disorder who presented to the ER for the evaluation of depression and suicidal thoughts. Patient was admitted on 9.13 status.     Patient meets criteria for cocaine/marijuana induced mood disorder vs. bipolar depression vs. MDD along with cocaine/marijuana use disorder severe. Patient remains hopeless and anhedonic. Patient will require continued hospitalization for ensure safety, observation and stabilization.     1) Cocaine/marijuana induced mood disorder and bipolar depression vs. MDD: Continue Depakote 500mg BID. Seroquel increased to 150mg QHS to target bipolar depression.   will get depakote level for april 2    2) PRNS: Haldol 5mg PO, Benadryl 50mg PO prn agitation (combative). Hydroxyzine: 50mg prn anxiety    3) Medical: will follow [facility]3]. podiatry consult ordered for fungal lesions.     4) Cocaine/marijuna use disorder: Will consider inpatient rehab.

## 2018-03-29 NOTE — PROGRESS NOTE BEHAVIORAL HEALTH - NSBHCHARTREVIEWVS_PSY_A_CORE FT
Vital Signs Last 24 Hrs  T(C): 36.9 (28 Mar 2018 18:00), Max: 36.9 (28 Mar 2018 18:00)  T(F): 98.4 (28 Mar 2018 18:00), Max: 98.4 (28 Mar 2018 18:00)  HR: 60 (28 Mar 2018 18:00) (60 - 60)  BP: 156/91 (29 Mar 2018 08:39) (156/91 - 175/92)  BP(mean): --  RR: 16 (28 Mar 2018 18:00) (16 - 16)  SpO2: --

## 2018-03-30 RX ORDER — TUBERCULIN PURIFIED PROTEIN DERIVATIVE 5 [IU]/.1ML
5 INJECTION, SOLUTION INTRADERMAL ONCE
Qty: 0 | Refills: 0 | Status: COMPLETED | OUTPATIENT
Start: 2018-03-30 | End: 2018-03-31

## 2018-03-30 RX ORDER — QUETIAPINE FUMARATE 200 MG/1
200 TABLET, FILM COATED ORAL AT BEDTIME
Qty: 0 | Refills: 0 | Status: DISCONTINUED | OUTPATIENT
Start: 2018-03-30 | End: 2018-04-05

## 2018-03-30 RX ADMIN — FLUCONAZOLE 100 MILLIGRAM(S): 150 TABLET ORAL at 09:04

## 2018-03-30 RX ADMIN — DIVALPROEX SODIUM 500 MILLIGRAM(S): 500 TABLET, DELAYED RELEASE ORAL at 09:04

## 2018-03-30 RX ADMIN — Medication 1 APPLICATION(S): at 20:11

## 2018-03-30 RX ADMIN — Medication 1 APPLICATION(S): at 09:05

## 2018-03-30 RX ADMIN — DIVALPROEX SODIUM 500 MILLIGRAM(S): 500 TABLET, DELAYED RELEASE ORAL at 20:11

## 2018-03-30 RX ADMIN — QUETIAPINE FUMARATE 200 MILLIGRAM(S): 200 TABLET, FILM COATED ORAL at 20:11

## 2018-03-30 NOTE — PROGRESS NOTE BEHAVIORAL HEALTH - CASE SUMMARY
52 year old  man, homeless, alone, 4-5 suicide attempts marked by taking pills of cutting wrists, last suicide attempt was last year resulting in hospitalization. Denies current or manic psychotic symptoms.  but , non compliant with outpatient treatment, multiple IPP admissions (last one last year via cutting),  unemployed, used to work in construction, with a history of Bipolar disorder and cocaine/MJ use disorder who presented to the ER for the evaluation of depression and suicidal thoughts. Patient was admitted on 9.13 status. Patient meets criteria for cocaine/marijuana induced mood disorder vs. bipolar depression vs. MDD along with cocaine/marijuana use disorder severe. Patient remains hopeless and anhedonic. Patient will require continued hospitalization for ensure safety, observation and stabilization. Will continue Depakote 500mg BID. Seroquel increased to 200mg QHS to target bipolar depression. Will get depakote level for april 2. Haldol 5mg PO, Benadryl 50mg PO prn agitation (combative). Hydroxyzine: 50mg prn anxiety. Termination discussed. Patient aware of transition to new physician. Patient response was processed.     3) Medical: will follow [facility] podiatry consult ordered for fungal lesions.     4) Cocaine/marijuna use disorder: Will consider inpatient rehab.

## 2018-03-30 NOTE — PROGRESS NOTE BEHAVIORAL HEALTH - NSBHFUPINTERVALHXFT_PSY_A_CORE
Patient evaluated with treatment team in morning rounds. No acute events overnight or PRNS given. Patient evaluated with treatment team in morning rounds. No acute events overnight or PRNS given. When asked about mood, during evaluation, patient says "Im tired...bored". He reports his last suicidal thought a few days ago, and when asked how he managed it, he says "I just pushed it away". Patient is visible in the milieu. He denies current active SI, HI. Also denies AH, VH, PI. [facility]

## 2018-03-30 NOTE — PROGRESS NOTE BEHAVIORAL HEALTH - NSBHCHARTREVIEWVS_PSY_A_CORE FT
Vital Signs Last 24 Hrs  T(C): 36.8 (29 Mar 2018 22:09), Max: 36.8 (29 Mar 2018 22:09)  T(F): 98.2 (29 Mar 2018 22:09), Max: 98.2 (29 Mar 2018 22:09)  HR: 70 (29 Mar 2018 22:09) (70 - 70)  BP: 153/62 (29 Mar 2018 22:09) (153/62 - 153/62)  BP(mean): --  RR: 20 (29 Mar 2018 22:09) (20 - 20)  SpO2: --

## 2018-03-30 NOTE — CHART NOTE - NSCHARTNOTEFT_GEN_A_CORE
Social work note:    PT is alert and oriented x3, denies current suicidal or homicidal ideation plan or intent, PT continues to report low mood/anxiety, he can be isolative to room, appetite and sleep are improving. PT has not provided any information regarding family or social supports for collateral contact, PT reports that he has a brother living in [place] but he does not have any current contact information. Limited social supports.    PT identifies active substance abuse prior to admission as stressor,  [facility] for continues in-patient substance abuse treatment when stable. Referral send via e-mail to central intake @ [facility] on 3/30.     will provide support daily and make collateral contacts as necessary.

## 2018-03-30 NOTE — PROGRESS NOTE ADULT - SUBJECTIVE AND OBJECTIVE BOX
pt stable alert in NAD  no new complaints    SUICIDAL THOUGHTS  ^DEPRESSED  No h/o HF  No pertinent family history in first degree relatives  MEWS Score  Tinea pedis of left foot  Essential hypertension  Depression  Suicidal thoughts  Essential hypertension  Tinea pedis of left foot  Depression, unspecified depression type  Polysubstance (excluding opioids) dependence  Bipolar disorder, current episode depressed, severe, without psychotic features  No significant past surgical history  DEPRESSED  90+    HEALTH ISSUES - PROBLEM Dx:  Essential hypertension: Essential hypertension  Tinea pedis of left foot: Tinea pedis of left foot  Depression, unspecified depression type: Depression, unspecified depression type  Polysubstance (excluding opioids) dependence  Bipolar disorder, current episode depressed, severe, without psychotic features        PAST MEDICAL & SURGICAL HISTORY:  Tinea pedis of left foot  Essential hypertension  Depression  No significant past surgical history    fish (Other)  No Known Drug Allergies  Seafood (Unknown)      FAMILY HISTORY:  No pertinent family history in first degree relatives      ammonium lactate 12% Lotion 1 Application(s) Topical two times a day  clotrimazole 1% Solution 1 Application(s) Topical two times a day  diphenhydrAMINE   Capsule 50 milliGRAM(s) Oral every 6 hours PRN  diVALproex  milliGRAM(s) Oral two times a day  fluconAZOLE   Tablet 100 milliGRAM(s) Oral daily  haloperidol     Tablet 5 milliGRAM(s) Oral every 6 hours PRN  hydrOXYzine hydrochloride 50 milliGRAM(s) Oral every 6 hours PRN  ibuprofen  Tablet 400 milliGRAM(s) Oral every 8 hours PRN  miconazole 2% Cream 1 Application(s) Topical two times a day  nystatin Powder 1 Application(s) Topical three times a day  QUEtiapine 150 milliGRAM(s) Oral at bedtime      T(C): 36.8 (03-29-18 @ 22:09), Max: 36.8 (03-29-18 @ 22:09)  HR: 70 (03-29-18 @ 22:09) (69 - 70)  BP: 153/62 (03-29-18 @ 22:09) (145/67 - 156/91)  RR: 20 (03-29-18 @ 22:09) (17 - 20)  SpO2: --    PE;  general:  stable  no acute cahnges  Lungs:    Heart:    EXT:    Neuro:                          CAPILLARY BLOOD GLUCOSE
Podiatry Follow up:  Pt seen bedside. Pt states Left foot pain has improved but is still present at this time.   Pt states he has been applying creams to his foot as directed.     < from: Xray Foot AP + Lateral + Oblique, Left (03.29.18 @ 11:36) >  EXAM:  XR FOOT COMP MIN 3 VIEWS LT        PROCEDURE DATE:  03/29/2018    INTERPRETATION:  Clinical History / Reason for exam: Pain  3. Images  No bony soft tissue or articular abnormality is seen.  Impression normal exam  KALE HERNANDEZ M.D., ATTENDING RADIOLOGIST  This document has been electronically signed. Mar 29 2018 11:45AM  < end of copied text >    A:  Left foot tinea pedis with interdigital maceration  Left midfoot superficial fissures  Right foot tinea pedis    P:  Continue with Clotrimazole to bilateral feet.  Continue with DARCO shoes bilaterally.   Podiatry to follow up [id number].

## 2018-03-30 NOTE — PROGRESS NOTE BEHAVIORAL HEALTH - SUMMARY
52 year old  man, homeless, alone, 4-5 suicide attempts marked by taking pills of cutting wrists, last suicide attempt was last year resulting in hospitalization. Denies current or manic psychotic symptoms.  but , non compliant with outpatient treatment, multiple IPP admissions (last one last year via cutting),  unemployed, used to work in construction, with a history of Bipolar disorder and cocaine/MJ use disorder who presented to the ER for the evaluation of depression and suicidal thoughts. Patient was admitted on 9.13 status.     Patient meets criteria for cocaine/marijuana induced mood disorder vs. bipolar depression vs. MDD along with cocaine/marijuana use disorder severe. Patient is responding to seroquel and depakote trial, his mood has improved and he has not had SI for several days. He continues to be anhedonic and apathic. He requires further hospitalization for medication titration and safety.     1) Cocaine/marijuana induced mood disorder and bipolar depression vs. MDD: Continue Depakote 500mg BID. Seroquel increased to 150mg QHS to target bipolar depression.   will get depakote level for april 2    2) PRNS: Haldol 5mg PO, Benadryl 50mg PO prn agitation (combative). Hydroxyzine: 50mg prn anxiety    3) Medical: podiatry addressing fungal infection     4) Cocaine/marijuna use disorder: Will consider inpatient rehab.  will order PPD and [facility] labs

## 2018-03-31 RX ADMIN — DIVALPROEX SODIUM 500 MILLIGRAM(S): 500 TABLET, DELAYED RELEASE ORAL at 08:25

## 2018-03-31 RX ADMIN — Medication 1 APPLICATION(S): at 08:24

## 2018-03-31 RX ADMIN — FLUCONAZOLE 100 MILLIGRAM(S): 150 TABLET ORAL at 08:25

## 2018-03-31 RX ADMIN — Medication 1 APPLICATION(S): at 20:26

## 2018-03-31 RX ADMIN — TUBERCULIN PURIFIED PROTEIN DERIVATIVE 5 UNIT(S): 5 INJECTION, SOLUTION INTRADERMAL at 15:48

## 2018-03-31 NOTE — PROGRESS NOTE BEHAVIORAL HEALTH - SUMMARY
52 year old  man, homeless, alone, 4-5 suicide attempts marked by taking pills of cutting wrists, last suicide attempt was last year resulting in hospitalization. Denies current or manic psychotic symptoms.  but , non compliant with outpatient treatment, multiple IPP admissions (last one last year via cutting),  unemployed, used to work in construction, with a history of Bipolar disorder and cocaine/MJ use disorder who presented to the ER for the evaluation of depression and suicidal thoughts. Patient was admitted on 9.13 status.

## 2018-03-31 NOTE — PROGRESS NOTE BEHAVIORAL HEALTH - NSBHFUPINTERVALHXFT_PSY_A_CORE
Patient is visible in the milieu. He denies current active SI, HI. Also denies AH, VH, PI. [facility]

## 2018-04-01 RX ADMIN — QUETIAPINE FUMARATE 200 MILLIGRAM(S): 200 TABLET, FILM COATED ORAL at 05:27

## 2018-04-01 RX ADMIN — DIVALPROEX SODIUM 500 MILLIGRAM(S): 500 TABLET, DELAYED RELEASE ORAL at 20:41

## 2018-04-01 RX ADMIN — Medication 1 APPLICATION(S): at 08:10

## 2018-04-01 RX ADMIN — QUETIAPINE FUMARATE 200 MILLIGRAM(S): 200 TABLET, FILM COATED ORAL at 20:42

## 2018-04-01 RX ADMIN — Medication 400 MILLIGRAM(S): at 20:43

## 2018-04-01 RX ADMIN — DIVALPROEX SODIUM 500 MILLIGRAM(S): 500 TABLET, DELAYED RELEASE ORAL at 05:27

## 2018-04-01 RX ADMIN — Medication 1 APPLICATION(S): at 20:42

## 2018-04-01 RX ADMIN — FLUCONAZOLE 100 MILLIGRAM(S): 150 TABLET ORAL at 08:09

## 2018-04-01 RX ADMIN — DIVALPROEX SODIUM 500 MILLIGRAM(S): 500 TABLET, DELAYED RELEASE ORAL at 08:09

## 2018-04-01 NOTE — PROGRESS NOTE BEHAVIORAL HEALTH - NSBHFUPINTERVALHXFT_PSY_A_CORE
Patient is visible in the milieu. He denies current active SI, HI. Also denies AH, VH, PI. He is agreeable to go to [facility]. no acute vent overnight, compliant with medications

## 2018-04-01 NOTE — PROGRESS NOTE BEHAVIORAL HEALTH - NSBHCHARTREVIEWVS_PSY_A_CORE FT
Vital Signs Last 24 Hrs  T(C): 36.1 (01 Apr 2018 10:35), Max: 36.6 (31 Mar 2018 18:18)  T(F): 97 (01 Apr 2018 10:35), Max: 97.8 (31 Mar 2018 18:18)  HR: 69 (01 Apr 2018 10:35) (61 - 69)  BP: 159/77 (01 Apr 2018 10:35) (155/70 - 177/95)  BP(mean): --  RR: 18 (01 Apr 2018 10:35) (16 - 18)  SpO2: --

## 2018-04-02 LAB
T PALLIDUM AB TITR SER: NEGATIVE — SIGNIFICANT CHANGE UP
VALPROATE SERPL-MCNC: 42 UG/ML — LOW (ref 50–100)

## 2018-04-02 RX ORDER — DIVALPROEX SODIUM 500 MG/1
500 TABLET, DELAYED RELEASE ORAL DAILY
Qty: 0 | Refills: 0 | Status: DISCONTINUED | OUTPATIENT
Start: 2018-04-02 | End: 2018-04-02

## 2018-04-02 RX ORDER — DIVALPROEX SODIUM 500 MG/1
1000 TABLET, DELAYED RELEASE ORAL AT BEDTIME
Qty: 0 | Refills: 0 | Status: DISCONTINUED | OUTPATIENT
Start: 2018-04-02 | End: 2018-04-02

## 2018-04-02 RX ORDER — QUETIAPINE FUMARATE 200 MG/1
50 TABLET, FILM COATED ORAL DAILY
Qty: 0 | Refills: 0 | Status: DISCONTINUED | OUTPATIENT
Start: 2018-04-02 | End: 2018-04-02

## 2018-04-02 RX ORDER — DIVALPROEX SODIUM 500 MG/1
500 TABLET, DELAYED RELEASE ORAL
Qty: 0 | Refills: 0 | Status: DISCONTINUED | OUTPATIENT
Start: 2018-04-02 | End: 2018-04-05

## 2018-04-02 RX ADMIN — Medication 1 APPLICATION(S): at 20:32

## 2018-04-02 RX ADMIN — Medication 0.5 APPLICATION(S): at 20:33

## 2018-04-02 RX ADMIN — NYSTATIN CREAM 1 APPLICATION(S): 100000 CREAM TOPICAL at 13:25

## 2018-04-02 RX ADMIN — Medication 1 APPLICATION(S): at 09:21

## 2018-04-02 RX ADMIN — Medication 0.5 APPLICATION(S): at 09:25

## 2018-04-02 RX ADMIN — QUETIAPINE FUMARATE 200 MILLIGRAM(S): 200 TABLET, FILM COATED ORAL at 20:32

## 2018-04-02 RX ADMIN — DIVALPROEX SODIUM 500 MILLIGRAM(S): 500 TABLET, DELAYED RELEASE ORAL at 09:21

## 2018-04-02 RX ADMIN — Medication 50 MILLIGRAM(S): at 09:26

## 2018-04-02 RX ADMIN — FLUCONAZOLE 100 MILLIGRAM(S): 150 TABLET ORAL at 09:21

## 2018-04-02 RX ADMIN — Medication 400 MILLIGRAM(S): at 07:00

## 2018-04-02 RX ADMIN — DIVALPROEX SODIUM 500 MILLIGRAM(S): 500 TABLET, DELAYED RELEASE ORAL at 20:32

## 2018-04-02 RX ADMIN — Medication 1 APPLICATION(S): at 09:26

## 2018-04-02 RX ADMIN — NYSTATIN CREAM 1 APPLICATION(S): 100000 CREAM TOPICAL at 09:26

## 2018-04-02 NOTE — PROGRESS NOTE BEHAVIORAL HEALTH - CASE SUMMARY
Mr Swan is a Mr Swan is a 52 year old man with a history of Bipolar disorder and polysubstance dependence who was admitted for the evaluation for increasing depression and suicidal ideations.   Patient continues to report depressed mood, hopelessness and helplessness. He reports that he no longer has suicidal ideations but is not ready for rehab at this time. Depakote level today is 42.   For now, patient’s Depakote will be adjusted to increasing to Depakote 500mg P.O Q AM , 1000mg P.O Q PM and Seroquel 50mg P.O Q AM and Seroquel 200mg Q PM for mood stabilization. Platelet level and liver function with normal limits at this time. Depakote level to be checked on Friday, 4/6/18.   AIMS score- 0

## 2018-04-02 NOTE — PROGRESS NOTE BEHAVIORAL HEALTH - SUMMARY
Patient is a 52 year old  man, homeless, alone, 4-5 suicide attempts marked by taking pills of cutting wrists, last suicide attempt was last year resulting in hospitalization. Denies current or manic psychotic symptoms.  but , non compliant with outpatient treatment, multiple [facility] admissions (last one last year via cutting),  unemployed, used to work in construction, with a history of Bipolar disorder and cocaine/MJ use disorder who presented to the ER for the evaluation of depression and suicidal thoughts. Patient was admitted on 9.13 status.     Patient meets criteria for substance induced mood disorder vs. bipolar depression vs. MDD along with cocaine/marijuana use disorder severe. Patient is responding to seroquel and depakote trial, patient denies any recent SI. Patient reports that his mood symptoms have not completely resolved. He requires further hospitalization for medication titration and safety.     1) Cocaine/marijuana induced mood disorder and bipolar depression vs. MDD: Continue Depakote 500mg BID, pending depakote level for today. Continue Seroquel increased to 200mg QHS to target bipolar depression.     2) PRNS: Haldol 5mg PO, Benadryl 50mg PO prn agitation (combative). Hydroxyzine: 50mg prn anxiety    3) Medical: podiatry addressing fungal infection     4) Cocaine/marijuna use disorder: Will consider inpatient rehab. PPD and [facility] labs ordered. Patient is a 52 year old  man, homeless, alone, 4-5 suicide attempts marked by taking pills of cutting wrists, last suicide attempt was last year resulting in hospitalization. Denies current or manic psychotic symptoms.  but , non compliant with outpatient treatment, multiple [facility]PP admissions (last one last year via cutting),  unemployed, used to work in construction, with a history of Bipolar disorder and cocaine/MJ use disorder who presented to the ER for the evaluation of depression and suicidal thoughts. Patient was admitted on 9.13 status.     Patient meets criteria for substance induced mood disorder vs. bipolar depression vs. MDD along with cocaine/marijuana use disorder severe. Patient is responding to seroquel and depakote trial, patient denies any recent SI. Patient reports that his mood symptoms have not completely resolved. He requires further hospitalization for medication titration and safety.     1) Cocaine/marijuana induced mood disorder and bipolar depression vs. MDD: Increase to Depakote 500mg Q AM and 1000mg Q PM,  increase to Seroquel 50mg Q AM and  200mg Q PMto target bipolar depression.     2) PRNS: Haldol 5mg PO, Benadryl 50mg PO prn agitation (combative). Hydroxyzine: 50mg prn anxiety    3) Medical: podiatry addressing fungal infection     4) Cocaine/marijuna use disorder: Will consider inpatient rehab. PPD and [facility] labs ordered.

## 2018-04-02 NOTE — CHART NOTE - NSCHARTNOTEFT_GEN_A_CORE
Patient appeared to have an anxious mood today and states he is not sure now if he wants to go to [facility].  Patient has been re-assigned to this worker today 4/2/18 and for the rest of this week.  Patient reports he wants referral to shelter upon discharge.  This worker will assess for any family contacts.  Patient has been less isolative on unit but continues to have poor coping skills to deal with anxiety.  This worker will continue to meet with patient daily.

## 2018-04-02 NOTE — PROGRESS NOTE BEHAVIORAL HEALTH - NSBHFUPINTERVALHXFT_PSY_A_CORE
Patient seen and examined. No acute issues overnight as per staff. Patient reports that his weekend "was fine." Patient reports however that his mood is "so-so." Patient reports that he is eating and sleeping well but continues to sleep in late. Patent reports that although the plan was discussed regarding his referral to [facility], he reports that he is "not ready." Patient reports that he is still adjusting to his medications. He states that he is not ready to attend groups and participate as he would need to on Rehab.  He also reports no SI over the weekend. Patient reports that although he understands that substance use may be related to his low mood and depression, and wants to go to rehab, he reports that he is not ready yet. Patient also reports that he is concerned about his ongoing fungal infection in left foot.

## 2018-04-03 LAB
HAV IGM SER-ACNC: SIGNIFICANT CHANGE UP
HBV CORE IGM SER-ACNC: SIGNIFICANT CHANGE UP
HBV SURFACE AG SER-ACNC: SIGNIFICANT CHANGE UP
HCV AB S/CO SERPL IA: 0.11 S/CO — SIGNIFICANT CHANGE UP
HCV AB SERPL-IMP: SIGNIFICANT CHANGE UP
HIV 1+2 AB+HIV1 P24 AG SERPL QL IA: SIGNIFICANT CHANGE UP

## 2018-04-03 RX ADMIN — Medication 1 APPLICATION(S): at 12:14

## 2018-04-03 RX ADMIN — Medication 1 APPLICATION(S): at 20:33

## 2018-04-03 RX ADMIN — NYSTATIN CREAM 1 APPLICATION(S): 100000 CREAM TOPICAL at 09:00

## 2018-04-03 RX ADMIN — QUETIAPINE FUMARATE 200 MILLIGRAM(S): 200 TABLET, FILM COATED ORAL at 20:33

## 2018-04-03 RX ADMIN — Medication 1 APPLICATION(S): at 20:32

## 2018-04-03 RX ADMIN — Medication 1 APPLICATION(S): at 09:00

## 2018-04-03 RX ADMIN — DIVALPROEX SODIUM 500 MILLIGRAM(S): 500 TABLET, DELAYED RELEASE ORAL at 08:57

## 2018-04-03 RX ADMIN — TUBERCULIN PURIFIED PROTEIN DERIVATIVE 5 UNIT(S): 5 INJECTION, SOLUTION INTRADERMAL at 09:13

## 2018-04-03 RX ADMIN — DIVALPROEX SODIUM 500 MILLIGRAM(S): 500 TABLET, DELAYED RELEASE ORAL at 20:33

## 2018-04-03 RX ADMIN — NYSTATIN CREAM 1 APPLICATION(S): 100000 CREAM TOPICAL at 20:32

## 2018-04-03 NOTE — PROGRESS NOTE BEHAVIORAL HEALTH - NSBHCHARTREVIEWINVESTIGATE_PSY_A_CORE FT
qtc: 441
qtc 441.
< from: 12 Lead ECG (03.26.18 @ 02:07) >    QTC Calculation(Bezet) 441 ms    < end of copied text >

## 2018-04-03 NOTE — PROGRESS NOTE BEHAVIORAL HEALTH - SUMMARY
Patient is a 52 year old  man, homeless, alone, 4-5 suicide attempts marked by taking pills of cutting wrists, last suicide attempt was last year resulting in hospitalization. Denies current or manic psychotic symptoms.  but , non compliant with outpatient treatment, multiple IPP admissions (last one last year via cutting),  unemployed, used to work in construction, with a history of Bipolar disorder and cocaine/MJ use disorder who presented to the ER for the evaluation of depression and suicidal thoughts. Patient was admitted on 9.13 status.     Patient meets criteria for substance induced mood disorder vs. bipolar depression vs. MDD along with cocaine/marijuana use disorder severe. Patient is responding to seroquel and depakote trial, patient denies any recent SI. Patient reports that his mood symptoms have not completely resolved but are improving. Patient is interested in CDRU

## 2018-04-03 NOTE — PROGRESS NOTE BEHAVIORAL HEALTH - NSBHFUPINTERVALHXFT_PSY_A_CORE
Patient seen and evaluated. chart reviewd. Patient reports that he is feeling better today. He states that he is not ammenable to increase of depakote. Patient reports that he is considering going to rehab but was concerned about his lack of clothes/shoes. Patient reports that if she is able to get attire, he would be more willing to attend groups and feels that rehab could be beneficial for him. Patient denies any suicidal thoughts.

## 2018-04-03 NOTE — PROGRESS NOTE BEHAVIORAL HEALTH - CASE SUMMARY
Mr Swan is a 52 year old man with a history of Bipolar disorder and polysubstance dependence who was admitted for the evaluation for increasing depression and suicidal ideations. Patient had indicated that he did not want his Depakote or Seroquel adjusted. He reports that he decompensated because of recent break up with his girlfriend and potential homelessness. He had reported that he no longer want to [facility] but today, he reports that his ambivalence is because he has no clothes or shoes and is concerned about his foot that has a fungal infection. Patient was informed that his needs will be addressed and he rescinded his refusal for rehab and is now willing to go.   Patient continues to appear less depressed and is no longer suicidal. Depakote and Seroquel will continue as currently prescribed .   AIMS score- 0

## 2018-04-03 NOTE — PROGRESS NOTE BEHAVIORAL HEALTH - NSBHCHARTREVIEWVS_PSY_A_CORE FT
Vital Signs Last 24 Hrs  T(C): 36 (03 Apr 2018 06:25), Max: 36.6 (02 Apr 2018 13:26)  T(F): 96.8 (03 Apr 2018 06:25), Max: 97.8 (02 Apr 2018 13:26)  HR: 57 (03 Apr 2018 06:25) (57 - 67)  BP: 142/75 (03 Apr 2018 06:25) (142/75 - 155/84)  BP(mean): --  RR: 17 (03 Apr 2018 06:25) (16 - 17)  SpO2: --

## 2018-04-04 RX ADMIN — QUETIAPINE FUMARATE 200 MILLIGRAM(S): 200 TABLET, FILM COATED ORAL at 20:49

## 2018-04-04 RX ADMIN — Medication 1 APPLICATION(S): at 09:57

## 2018-04-04 RX ADMIN — Medication 1 APPLICATION(S): at 20:49

## 2018-04-04 RX ADMIN — DIVALPROEX SODIUM 500 MILLIGRAM(S): 500 TABLET, DELAYED RELEASE ORAL at 08:54

## 2018-04-04 RX ADMIN — Medication 1 APPLICATION(S): at 09:56

## 2018-04-04 RX ADMIN — Medication 1 APPLICATION(S): at 20:54

## 2018-04-04 RX ADMIN — Medication 0.5 APPLICATION(S): at 09:59

## 2018-04-04 RX ADMIN — NYSTATIN CREAM 1 APPLICATION(S): 100000 CREAM TOPICAL at 09:57

## 2018-04-04 RX ADMIN — DIVALPROEX SODIUM 500 MILLIGRAM(S): 500 TABLET, DELAYED RELEASE ORAL at 20:49

## 2018-04-04 NOTE — PROGRESS NOTE BEHAVIORAL HEALTH - SUMMARY
Mr Swan is a 52 year old man with a history of Bipolar disorder and polysubstance dependence who was admitted for the evaluation for increasing depression and suicidal ideations. Patient appears to be making progress is, less depressed , interacting better with peers and staff. He continues to deny suicidal ideations, intent or plan for now. He continues to interest in potential transfer to rehab. Depakote and Seroquel will continue as currently prescribed.   AIMS score- 0

## 2018-04-04 NOTE — CHART NOTE - NSCHARTNOTEFT_GEN_A_CORE
Patient has been referred to rehab for 4/6/18.  Patient continues to have somewhat of an anxious mood.  Patient has been learning ways to cope with feelings of depression and he has been attending some of the groups.  Patient has been more social and visible on the unit.  Patient refuses family contact at this time.  This worker will continue to meet with patient daily.

## 2018-04-04 NOTE — PROGRESS NOTE BEHAVIORAL HEALTH - SECONDARY DX1
Polysubstance (excluding opioids) dependence

## 2018-04-04 NOTE — PROGRESS NOTE BEHAVIORAL HEALTH - NSBHFUPINTERVALHXFT_PSY_A_CORE
Patient seen and interviewed. He reports better sleep and appetite. he reports feeling less depressed with no suicidal ideations. He reports that he feels ready to go to rehab and believes that he will be able to participate in the groups . he continues to express anxiety about not having any thing decent to wear but was informed that his concerns will be addressed .

## 2018-04-04 NOTE — PROGRESS NOTE BEHAVIORAL HEALTH - NSBHCHARTREVIEWVS_PSY_A_CORE FT
Vital Signs Last 24 Hrs  T(C): --  T(F): --  HR: 80 (03 Apr 2018 17:54) (80 - 80)  BP: 158/87 (03 Apr 2018 17:54) (158/87 - 158/87)  BP(mean): --  RR: 18 (03 Apr 2018 17:54) (18 - 18)  SpO2: --

## 2018-04-05 ENCOUNTER — INPATIENT (INPATIENT)
Facility: HOSPITAL | Age: 53
LOS: 0 days | Discharge: LEFT BEFORE DISCHARGE | End: 2018-04-05
Attending: INTERNAL MEDICINE | Admitting: INTERNAL MEDICINE

## 2018-04-05 VITALS — TEMPERATURE: 98 F | SYSTOLIC BLOOD PRESSURE: 136 MMHG | DIASTOLIC BLOOD PRESSURE: 80 MMHG | HEART RATE: 80 BPM

## 2018-04-05 RX ORDER — SOD,AMMONIUM,POTASSIUM LACTATE
1 CREAM (GRAM) TOPICAL
Qty: 0 | Refills: 0 | COMMUNITY
Start: 2018-04-05

## 2018-04-05 RX ORDER — HYDROXYZINE HCL 10 MG
1 TABLET ORAL
Qty: 0 | Refills: 0 | COMMUNITY
Start: 2018-04-05

## 2018-04-05 RX ORDER — DIPHENHYDRAMINE HCL 50 MG
1 CAPSULE ORAL
Qty: 0 | Refills: 0 | COMMUNITY
Start: 2018-04-05

## 2018-04-05 RX ORDER — DIVALPROEX SODIUM 500 MG/1
1 TABLET, DELAYED RELEASE ORAL
Qty: 0 | Refills: 0 | COMMUNITY
Start: 2018-04-05

## 2018-04-05 RX ORDER — NYSTATIN CREAM 100000 [USP'U]/G
1 CREAM TOPICAL
Qty: 0 | Refills: 0 | COMMUNITY
Start: 2018-04-05

## 2018-04-05 RX ORDER — IBUPROFEN 200 MG
1 TABLET ORAL
Qty: 0 | Refills: 0 | COMMUNITY
Start: 2018-04-05

## 2018-04-05 RX ORDER — QUETIAPINE FUMARATE 200 MG/1
1 TABLET, FILM COATED ORAL
Qty: 0 | Refills: 0 | COMMUNITY
Start: 2018-04-05

## 2018-04-05 RX ORDER — HALOPERIDOL DECANOATE 100 MG/ML
1 INJECTION INTRAMUSCULAR
Qty: 0 | Refills: 0 | COMMUNITY
Start: 2018-04-05

## 2018-04-05 RX ORDER — MICONAZOLE NITRATE 2 %
1 CREAM (GRAM) TOPICAL
Qty: 0 | Refills: 0 | COMMUNITY
Start: 2018-04-05

## 2018-04-05 RX ADMIN — NYSTATIN CREAM 1 APPLICATION(S): 100000 CREAM TOPICAL at 08:21

## 2018-04-05 RX ADMIN — DIVALPROEX SODIUM 500 MILLIGRAM(S): 500 TABLET, DELAYED RELEASE ORAL at 08:16

## 2018-04-05 RX ADMIN — Medication 1 APPLICATION(S): at 08:21

## 2018-04-05 RX ADMIN — Medication 1 APPLICATION(S): at 08:15

## 2018-04-05 NOTE — DISCHARGE NOTE BEHAVIORAL HEALTH - NSBHDCMEDSFT_PSY_A_CORE
Patient was restarted on Depakote 500mg P.o BID and Seroquel 200mg P.o bedtime, Depakote level on 4/2/18 was 42. patient was offered an increase in the dose of Depakote but he refused.

## 2018-04-05 NOTE — PROGRESS NOTE BEHAVIORAL HEALTH - NSBHFUPTYPE_PSY_A_CORE
Inpatient
Inpatient-On Service Note
Inpatient-On Service Note
Inpatient

## 2018-04-05 NOTE — PROGRESS NOTE BEHAVIORAL HEALTH - THOUGHT CONTENT
Unremarkable
Suicidality/Hopelessness
Unremarkable
Unremarkable
Suicidality/Hopelessness
Hopelessness/Suicidality
Unremarkable
Hopelessness/Suicidality
Hopelessness/Suicidality
Suicidality/Hopelessness
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
## 2018-04-05 NOTE — PROGRESS NOTE BEHAVIORAL HEALTH - NSBHADMITDANGERSELF_PSY_A_CORE
unable to care for self
unable to care for self
suicidal ideation with plan and means
suicidal ideation with plan and means
suicidal behavior
unable to care for self
unable to care for self
suicidal ideation with plan and means

## 2018-04-05 NOTE — PROGRESS NOTE BEHAVIORAL HEALTH - NS ED BHA MED ROS EYES
No complaints

## 2018-04-05 NOTE — PROGRESS NOTE BEHAVIORAL HEALTH - NSBHFUPVIOL_PSY_A_CORE
none known

## 2018-04-05 NOTE — PROGRESS NOTE BEHAVIORAL HEALTH - PROBLEM SELECTOR PLAN 1
Continue Depakote 500mg p.o BID  Continue 50mg P.o Daily  Depakote level on Friday
1) Cocaine/marijuana induced mood disorder and bipolar depression vs. MDD: - Continue Depakote 500mg Q AM and 500mg Q PM  - Continue 200mg QPM to target bipolar depression.   2) PRNS: Haldol 5mg PO, Benadryl 50mg PO prn agitation (combative). Hydroxyzine: 50mg prn anxiety  3) Cocaine/marijuna use disorder: Will consider inpatient rehab. PPD and [facility] labs ordered.
Continue Depakote 500mg p.o BID  Continue 50mg P.o Daily  Depakote level on Tomorrow.
1. Depakote 500mg P.O Q AM , 1000mg P.O Q PM   2.  Seroquel 50mg P.O Q AM and Seroquel 200mg Q PM for mood stabilization.   3. Depakote level to be checked on Friday, 4/6/18.

## 2018-04-05 NOTE — PROGRESS NOTE BEHAVIORAL HEALTH - SUMMARY
Patient is a 52 year old man with a history of Bipolar disorder and polysubstance dependence who was admitted for the evaluation for increasing depression and suicidal ideations. Patient appears to be less depressed, interacting better with peers and staff. Pt continues to deny suicidal ideations, intent or plan for now. He continues to interest in potential transfer to rehab. Depakote and Seroquel will continue as currently prescribed. Discharge planning.  AIMS score- 0.

## 2018-04-05 NOTE — PROGRESS NOTE BEHAVIORAL HEALTH - NSBHFUPINTERVALCCFT_PSY_A_CORE
" im better"
"My mood is so-so"
"nothing"
Pt. is feeling better.
I am not feeling well
Pt. is feeling better.
"im depressed"
"I am feeling better"
"I am not a morning person"

## 2018-04-05 NOTE — PROGRESS NOTE BEHAVIORAL HEALTH - CASE SUMMARY
Mr Swan is a 52 year old man with a history of Bipolar disorder and polysubstance dependence who was admitted for the evaluation for increasing depression and suicidal ideations. Patient appears less depressed, no longer has suicidal thoughts, intent or plan and has better mood. He is tolerating his medication with no side effects and is agreeable for transfer to the CDU today.   AIMS score- 0.

## 2018-04-05 NOTE — PROGRESS NOTE BEHAVIORAL HEALTH - PROBLEM SELECTOR PLAN 2
referral to Substance Use rehab on psychiatry clearance.
Follow up with JEZ HAIDER
same as above
1. Refer to CD[facility] upon mood stabilization

## 2018-04-05 NOTE — PROGRESS NOTE BEHAVIORAL HEALTH - NSBHCHARTREVIEWVS_PSY_A_CORE FT
Vital Signs Last 24 Hrs  T(C): --  T(F): --  HR: --  BP: --  BP(mean): --  RR: --  SpO2: -- Patient refused vitals.

## 2018-04-05 NOTE — PROGRESS NOTE BEHAVIORAL HEALTH - NSBHATTESTSEENBY_PSY_A_CORE
Attending Psychiatrist supervising NP/Trainee, meeting pt...
Attending Psychiatrist supervising NP/Trainee, meeting pt...
attending Psychiatrist without NP/Trainee
Attending Psychiatrist supervising NP/Trainee, meeting pt...
attending Psychiatrist without NP/Trainee
Attending Psychiatrist supervising NP/Trainee, meeting pt...
Attending Psychiatrist supervising NP/Trainee, meeting pt...
attending Psychiatrist without NP/Trainee

## 2018-04-05 NOTE — DISCHARGE NOTE BEHAVIORAL HEALTH - HPI (INCLUDE ILLNESS QUALITY, SEVERITY, DURATION, TIMING, CONTEXT, MODIFYING FACTORS, ASSOCIATED SIGNS AND SYMPTOMS)
Mr Beny is a 52 year old  man, homeless , alone,  but  , unemployed, used to work in construction, with a history of Bipolar disorder and Polysubstance dependence who presented to the ER for the evaluation of depression and suicidal thoughts.   Patient reports that he does not have any stressors , however he has been feeling increasingly depressed , hopeless, helpless with poor sleep, poor appetite, and thinking life is not worth living. patient reports that he is having thoughts of killing himself however currently has no intention or plan. Patient reports that he does not have a psychiatrist and can not remember the last time, he saw one. he reports that he has been in and out of hospitals  and has been unable to settle down to follow up with a psychiatrist because of his depression. Patient reports that he however takes Depakote 500mg twice a day and Seroquel 200mg at night , last use was 4 days ago. he reports that his medication is currently being prescribed by a Dr Gómez at [facility] . Patient denies having any symptoms of acute jadon or psychosis at this time. He reports that he uses cocaine and marijuana episodically, last use was 2 days ago, $100 worth of cocaine and $20 worth of Marijuana . he denies use of alcohol or other illicit drugs.

## 2018-04-05 NOTE — DISCHARGE NOTE BEHAVIORAL HEALTH - NSBHDCSUICSAFETYFT_PSY_A_CORE
Patient should call 911 or return to the ER if he feels increasinly sucidal or feel that he is a danger to himself or others Patient should call 911 or return to the ER if he feels increasingly suicidal or feel that he is a danger to himself or others

## 2018-04-05 NOTE — DISCHARGE NOTE BEHAVIORAL HEALTH - NSBHDCDXVALIDYESFT_PSY_A_CORE
Patient's diagnosis continues to be Bipolar disorder , current episode depressed and polysubstance dependence

## 2018-04-05 NOTE — DISCHARGE NOTE BEHAVIORAL HEALTH - NSBHDCSUICFCTRMIT_PSY_A_CORE
Patient is future oriented, identifies reasons for living and engages well in treatment planning. Patient presents with motivation to engage in substance use treatment.

## 2018-04-05 NOTE — PROGRESS NOTE BEHAVIORAL HEALTH - NSBHFUPINTERVALHXFT_PSY_A_CORE
Patient seen and evaluated. Patient reports that he is doing well. When asked if patient would like to attend DBT group, patient reports that it is too early for him. Patient reports continued interest in the rehab program. No acute events overnight.

## 2018-04-05 NOTE — DISCHARGE NOTE BEHAVIORAL HEALTH - NSBHDCSUBSTHXFT_PSY_A_CORE
patient reports episodic use of cannabis , cocaine and stimulants , but reports multiple rehab admissions

## 2018-04-05 NOTE — DISCHARGE NOTE BEHAVIORAL HEALTH - NSBHDCCONDITIONFT_PSY_A_CORE
Patient appears brighter , interacting appropriately with staff and peers. He appears less depressed and denies suicdal ideations , intent or plan at this time. Patient is motivated for rehab .

## 2018-04-05 NOTE — CHART NOTE - NSCHARTNOTEFT_GEN_A_CORE
Social work discharge note:    PT is alert and oriented x3, denies homicidal or suicidal ideation plan or intent, no psychosis noted. PT is being transferred to [facility] @ [facility] per his request for in-patient substance abuse treatment. Alternative outpatient appointment made with [facility] for intensive out patient treatment once patient completes in-patient CDRU Unit. Patient has not provided any contacts for collateral, he reports limited social supports and continues to refuse upon discharge to provide contact information to involve or inform family of treatment plan. No further action taken.

## 2018-04-05 NOTE — PROGRESS NOTE BEHAVIORAL HEALTH - BODY HABITUS
Average build
Average build
Well nourished/Average build
Average build/Well nourished
Average build
Well nourished/Average build
Average build
Well nourished/Average build

## 2018-04-05 NOTE — CHART NOTE - NSCHARTNOTEFT_GEN_A_CORE
Allergies:  fish (Other)  No Known Drug Allergies  Seafood (Unknown)      Diet: Regular    Activity: as tolerated    Follow up with    1. PMD in 2 weeks    2. Psych in 2 weeks    3.    Follow up for abnormal labs/tests    1.    Extra Instructions:      Flu Vaccine given  Yes_____         No______      Diagnosis:  Chemical Dependency   Maintain sobriety  refrain from all use      Patient Signature___________________________________________  Date_________________      Nurse Signature_____________________________________________Date_________________      *Patient signed AMA.  Advised Patient about risks and verbalized understanding.  Advised Patient to follow up with PCP ASAP*

## 2018-04-05 NOTE — DISCHARGE NOTE BEHAVIORAL HEALTH - CARE PROVIDER_API CALL
Jyoti Butler), Psychiatry  [address] [place] [address]  Phone: [phone]  Fax: [phone]

## 2018-04-05 NOTE — PROGRESS NOTE BEHAVIORAL HEALTH - PROBLEM SELECTOR PROBLEM 1
Bipolar disorder, current episode depressed, severe, without psychotic features

## 2018-04-05 NOTE — PROGRESS NOTE BEHAVIORAL HEALTH - NSBHPTASSESSDT_PSY_A_CORE
01-Apr-2018 13:14
02-Apr-2018 10:20
04-Apr-2018 11:00
28-Mar-2018
29-Mar-2018 09:28
30-Mar-2018 08:07
31-Mar-2018 11:56
27-Mar-2018
03-Apr-2018 11:19
05-Apr-2018 11:44

## 2018-04-05 NOTE — DISCHARGE NOTE BEHAVIORAL HEALTH - MEDICATION SUMMARY - MEDICATIONS TO TAKE
I will START or STAY ON the medications listed below when I get home from the hospital:    ibuprofen 400 mg oral tablet  -- 1 tab(s) by mouth every 8 hours, As needed, mod pain  -- Indication: For Tinea pedis of left foot    divalproex sodium 500 mg oral delayed release tablet  -- 1 tab(s) by mouth 2 times a day  -- Indication: For Bipolar disorder, current episode depressed, severe, without psychotic features    diphenhydrAMINE 50 mg oral capsule  -- 1 cap(s) by mouth every 6 hours, As needed, agitation  -- Indication: For Bipolar disorder, current episode depressed, severe, without psychotic features    haloperidol 5 mg oral tablet  -- 1 tab(s) by mouth every 6 hours, As needed, agitation  -- Indication: For Bipolar disorder, current episode depressed, severe, without psychotic features    QUEtiapine 200 mg oral tablet  -- 1 tab(s) by mouth once a day (at bedtime)  -- Indication: For Bipolar disorder, current episode depressed, severe, without psychotic features    hydrOXYzine hydrochloride 50 mg oral tablet  -- 1 tab(s) by mouth every 6 hours, As needed, Anxiety  -- Indication: For Bipolar disorder, current episode depressed, severe, without psychotic features    ammonium lactate 12% topical lotion  -- 1 application on skin 2 times a day  -- Indication: For Tinea pedis of left foot    clotrimazole 1% topical solution  -- 1 application on skin 2 times a day  -- Indication: For Tinea pedis of left foot    miconazole 2% topical cream  -- 1 application on skin 2 times a day  -- Indication: For Tinea pedis of left foot    nystatin 100,000 units/g topical powder  -- 1 application on skin 3 times a day  -- Indication: For Tinea pedis of left foot

## 2018-04-05 NOTE — PROGRESS NOTE BEHAVIORAL HEALTH - PRIMARY DX
Bipolar disorder, current episode depressed, severe, without psychotic features

## 2018-04-05 NOTE — PROGRESS NOTE BEHAVIORAL HEALTH - NS ED BHA MSE GENERAL APPEARANCE
No deformities present/Well developed
Well developed/No deformities present
Well developed/No deformities present
No deformities present/Well developed
No deformities present/Well developed
Well developed/No deformities present
No deformities present/Well developed
Well developed/No deformities present

## 2018-04-05 NOTE — PROGRESS NOTE BEHAVIORAL HEALTH - BEHAVIOR
Cooperative

## 2018-04-05 NOTE — PROGRESS NOTE BEHAVIORAL HEALTH - AXIS III
hypertension, fungal foot infection

## 2018-04-05 NOTE — PROGRESS NOTE BEHAVIORAL HEALTH - NSBHLEGALSTATUS_PSY_A_CORE
9.13 (Voluntary)
9.39 (Emergency)
9.13 (Voluntary)

## 2018-04-05 NOTE — PROGRESS NOTE BEHAVIORAL HEALTH - AFFECT RANGE
Constricted

## 2018-04-11 DIAGNOSIS — F14.24 COCAINE DEPENDENCE WITH COCAINE-INDUCED MOOD DISORDER: ICD-10-CM

## 2018-04-11 DIAGNOSIS — F14.20 COCAINE DEPENDENCE, UNCOMPLICATED: ICD-10-CM

## 2018-04-11 DIAGNOSIS — Z91.14 PATIENT'S OTHER NONCOMPLIANCE WITH MEDICATION REGIMEN: ICD-10-CM

## 2018-04-11 DIAGNOSIS — I10 ESSENTIAL (PRIMARY) HYPERTENSION: ICD-10-CM

## 2018-04-11 DIAGNOSIS — F12.288 CANNABIS DEPENDENCE WITH OTHER CANNABIS-INDUCED DISORDER: ICD-10-CM

## 2018-04-11 DIAGNOSIS — F31.9 BIPOLAR DISORDER, UNSPECIFIED: ICD-10-CM

## 2018-04-11 DIAGNOSIS — F31.4 BIPOLAR DISORDER, CURRENT EPISODE DEPRESSED, SEVERE, WITHOUT PSYCHOTIC FEATURES: ICD-10-CM

## 2018-04-11 DIAGNOSIS — Z59.0 HOMELESSNESS: ICD-10-CM

## 2018-04-11 DIAGNOSIS — B35.3 TINEA PEDIS: ICD-10-CM

## 2018-04-11 DIAGNOSIS — R45.851 SUICIDAL IDEATIONS: ICD-10-CM

## 2018-04-11 DIAGNOSIS — F12.20 CANNABIS DEPENDENCE, UNCOMPLICATED: ICD-10-CM

## 2018-04-11 DIAGNOSIS — Z91.5 PERSONAL HISTORY OF SELF-HARM: ICD-10-CM

## 2018-04-11 DIAGNOSIS — Z51.89 ENCOUNTER FOR OTHER SPECIFIED AFTERCARE: ICD-10-CM

## 2018-04-11 DIAGNOSIS — Z91.19 PATIENT'S NONCOMPLIANCE WITH OTHER MEDICAL TREATMENT AND REGIMEN: ICD-10-CM

## 2018-04-11 SDOH — ECONOMIC STABILITY - HOUSING INSECURITY: HOMELESSNESS: Z59.0

## 2018-07-09 ENCOUNTER — INPATIENT (INPATIENT)
Facility: HOSPITAL | Age: 53
LOS: 8 days | Discharge: HOME | End: 2018-07-18
Attending: PSYCHIATRY & NEUROLOGY | Admitting: PSYCHIATRY & NEUROLOGY
Payer: MEDICAID

## 2018-07-09 VITALS
RESPIRATION RATE: 18 BRPM | HEART RATE: 80 BPM | TEMPERATURE: 99 F | OXYGEN SATURATION: 98 % | SYSTOLIC BLOOD PRESSURE: 152 MMHG | DIASTOLIC BLOOD PRESSURE: 88 MMHG

## 2018-07-09 NOTE — ED ADULT TRIAGE NOTE - CHIEF COMPLAINT QUOTE
pt presents c/o worsening depression and suicidal ideation since this evening. pt also endorses auditory hallucinations. denies VH, denies etoh use or drug use. pt calm and cooperative in triage. pt reprots he ran out of his medications on 7/5 and has not been able to take them. PMHX: depression, bipolar disorder

## 2018-07-10 DIAGNOSIS — R69 ILLNESS, UNSPECIFIED: ICD-10-CM

## 2018-07-10 DIAGNOSIS — F31.9 BIPOLAR DISORDER, UNSPECIFIED: ICD-10-CM

## 2018-07-10 LAB
ALBUMIN SERPL ELPH-MCNC: 4.3 G/DL — SIGNIFICANT CHANGE UP (ref 3.3–5)
ALP SERPL-CCNC: 75 U/L — SIGNIFICANT CHANGE UP (ref 40–120)
ALT FLD-CCNC: 22 U/L — SIGNIFICANT CHANGE UP (ref 4–41)
APAP SERPL-MCNC: < 15 UG/ML — LOW (ref 15–25)
AST SERPL-CCNC: 24 U/L — SIGNIFICANT CHANGE UP (ref 4–40)
BASOPHILS # BLD AUTO: 0.07 K/UL — SIGNIFICANT CHANGE UP (ref 0–0.2)
BASOPHILS NFR BLD AUTO: 0.8 % — SIGNIFICANT CHANGE UP (ref 0–2)
BILIRUB SERPL-MCNC: 0.3 MG/DL — SIGNIFICANT CHANGE UP (ref 0.2–1.2)
BUN SERPL-MCNC: 14 MG/DL — SIGNIFICANT CHANGE UP (ref 7–23)
CALCIUM SERPL-MCNC: 9.2 MG/DL — SIGNIFICANT CHANGE UP (ref 8.4–10.5)
CHLORIDE SERPL-SCNC: 98 MMOL/L — SIGNIFICANT CHANGE UP (ref 98–107)
CO2 SERPL-SCNC: 26 MMOL/L — SIGNIFICANT CHANGE UP (ref 22–31)
CREAT SERPL-MCNC: 0.93 MG/DL — SIGNIFICANT CHANGE UP (ref 0.5–1.3)
EOSINOPHIL # BLD AUTO: 0.48 K/UL — SIGNIFICANT CHANGE UP (ref 0–0.5)
EOSINOPHIL NFR BLD AUTO: 5.4 % — SIGNIFICANT CHANGE UP (ref 0–6)
ETHANOL BLD-MCNC: < 10 MG/DL — SIGNIFICANT CHANGE UP
GLUCOSE SERPL-MCNC: 115 MG/DL — HIGH (ref 70–99)
HCT VFR BLD CALC: 36 % — LOW (ref 39–50)
HGB BLD-MCNC: 12.2 G/DL — LOW (ref 13–17)
IMM GRANULOCYTES # BLD AUTO: 0.02 # — SIGNIFICANT CHANGE UP
IMM GRANULOCYTES NFR BLD AUTO: 0.2 % — SIGNIFICANT CHANGE UP (ref 0–1.5)
LYMPHOCYTES # BLD AUTO: 3.35 K/UL — HIGH (ref 1–3.3)
LYMPHOCYTES # BLD AUTO: 37.9 % — SIGNIFICANT CHANGE UP (ref 13–44)
MCHC RBC-ENTMCNC: 28.8 PG — SIGNIFICANT CHANGE UP (ref 27–34)
MCHC RBC-ENTMCNC: 33.9 % — SIGNIFICANT CHANGE UP (ref 32–36)
MCV RBC AUTO: 85.1 FL — SIGNIFICANT CHANGE UP (ref 80–100)
MONOCYTES # BLD AUTO: 0.81 K/UL — SIGNIFICANT CHANGE UP (ref 0–0.9)
MONOCYTES NFR BLD AUTO: 9.2 % — SIGNIFICANT CHANGE UP (ref 2–14)
NEUTROPHILS # BLD AUTO: 4.1 K/UL — SIGNIFICANT CHANGE UP (ref 1.8–7.4)
NEUTROPHILS NFR BLD AUTO: 46.5 % — SIGNIFICANT CHANGE UP (ref 43–77)
NRBC # FLD: 0 — SIGNIFICANT CHANGE UP
PLATELET # BLD AUTO: 203 K/UL — SIGNIFICANT CHANGE UP (ref 150–400)
PMV BLD: 13.2 FL — HIGH (ref 7–13)
POTASSIUM SERPL-MCNC: 3.8 MMOL/L — SIGNIFICANT CHANGE UP (ref 3.5–5.3)
POTASSIUM SERPL-SCNC: 3.8 MMOL/L — SIGNIFICANT CHANGE UP (ref 3.5–5.3)
PROT SERPL-MCNC: 7.1 G/DL — SIGNIFICANT CHANGE UP (ref 6–8.3)
RBC # BLD: 4.23 M/UL — SIGNIFICANT CHANGE UP (ref 4.2–5.8)
RBC # FLD: 13.5 % — SIGNIFICANT CHANGE UP (ref 10.3–14.5)
SALICYLATES SERPL-MCNC: < 5 MG/DL — LOW (ref 15–30)
SODIUM SERPL-SCNC: 137 MMOL/L — SIGNIFICANT CHANGE UP (ref 135–145)
TSH SERPL-MCNC: 1.06 UIU/ML — SIGNIFICANT CHANGE UP (ref 0.27–4.2)
WBC # BLD: 8.83 K/UL — SIGNIFICANT CHANGE UP (ref 3.8–10.5)
WBC # FLD AUTO: 8.83 K/UL — SIGNIFICANT CHANGE UP (ref 3.8–10.5)

## 2018-07-10 PROCEDURE — 99285 EMERGENCY DEPT VISIT HI MDM: CPT

## 2018-07-10 RX ORDER — DIVALPROEX SODIUM 500 MG/1
500 TABLET, DELAYED RELEASE ORAL
Qty: 0 | Refills: 0 | Status: DISCONTINUED | OUTPATIENT
Start: 2018-07-10 | End: 2018-07-14

## 2018-07-10 RX ORDER — HYDROXYZINE HCL 10 MG
50 TABLET ORAL EVERY 6 HOURS
Qty: 0 | Refills: 0 | Status: DISCONTINUED | OUTPATIENT
Start: 2018-07-10 | End: 2018-07-18

## 2018-07-10 RX ORDER — DIPHENHYDRAMINE HCL 50 MG
50 CAPSULE ORAL ONCE
Qty: 0 | Refills: 0 | Status: DISCONTINUED | OUTPATIENT
Start: 2018-07-10 | End: 2018-07-18

## 2018-07-10 RX ORDER — DIPHENHYDRAMINE HCL 50 MG
50 CAPSULE ORAL EVERY 6 HOURS
Qty: 0 | Refills: 0 | Status: DISCONTINUED | OUTPATIENT
Start: 2018-07-10 | End: 2018-07-18

## 2018-07-10 RX ORDER — HALOPERIDOL DECANOATE 100 MG/ML
5 INJECTION INTRAMUSCULAR ONCE
Qty: 0 | Refills: 0 | Status: DISCONTINUED | OUTPATIENT
Start: 2018-07-10 | End: 2018-07-18

## 2018-07-10 RX ORDER — HALOPERIDOL DECANOATE 100 MG/ML
5 INJECTION INTRAMUSCULAR EVERY 6 HOURS
Qty: 0 | Refills: 0 | Status: DISCONTINUED | OUTPATIENT
Start: 2018-07-10 | End: 2018-07-18

## 2018-07-10 RX ADMIN — DIVALPROEX SODIUM 500 MILLIGRAM(S): 500 TABLET, DELAYED RELEASE ORAL at 21:16

## 2018-07-10 RX ADMIN — Medication 50 MILLIGRAM(S): at 13:43

## 2018-07-10 NOTE — ED BEHAVIORAL HEALTH ASSESSMENT NOTE - SUMMARY
Patient is a 52 year old unemployed non-caregiver male currently homeless. PPH Bipolar Disorder and Polysubstance abuse. He has a history of multiple past inpatient admissions last 6/6-6/12 2018 at [facility]. He has a history of multiple self reported suicide attempts last 1 year ago via OD. He denies history of violence or aggression. Patient has documented history of legal issues - last incarcerated x 40 days 1 year ago at [facility]. He has a history of polysusbtance abuse- stimulants, marijuana, cocaine and alcohol. Patient reports he has only used marijuana a few days ago. He has a history of multiple rehab/detox last was 5/30-6/1 at [facility]. He denies history of withdrawal symptoms/DTs or seizures. PMH includes history of HTN. BIBA for SI/depression.    Patient presents to the ER in the context of SI/depression. Patient endorses suicidal ideation to jump in front of a train and continued depression due to multiple psychosocial stressors. He is unable to safety plan outside of the hospital environment. Patient presents an imminent risk to self and requires inpatient admission for safety and stabilization.      Patient denies current SI/HI/SIB while in the hospital and endorses feeling safe. Agreed he can alert staff if he has worsening in symptoms SI/HI/SIB. He is not aggressive or violent. No 1:1 needed. Patient is a 52 year old unemployed non-caregiver male currently homeless. PPH Bipolar Disorder and Polysubstance abuse. He has a history of multiple past inpatient admissions last 6/6-6/12 2018 at [facility]. He has a history of multiple self reported suicide attempts last 1 year ago via OD. He denies history of violence or aggression. Patient has documented history of legal issues - last incarcerated x 40 days 1 year ago at [facility]. He has a history of polysubstance abuse- stimulants, marijuana, cocaine and alcohol. Patient reports he most recently used marijuana and cocaine a few days ago. He has a history of multiple rehab/detox last was 5/30-6/1 at [facility]. He denies history of withdrawal symptoms/DTs or seizures. PMH includes history of HTN. BIBA for SI/depression.      Patient presents to the ER in the context of SI/depression. Patient endorses suicidal ideation to jump in front of a train and continued depression due to multiple psychosocial stressors. He is unable to safety plan outside of the hospital environment. He is impulsive and unpredictable with poor judgement and insight. Patient presents an imminent risk to self and requires inpatient admission for safety and stabilization.      Patient denies current SI/HI/SIB while in the hospital and endorses feeling safe. Agreed he can alert staff if he has worsening in symptoms SI/HI/SIB. He is not aggressive or violent. No 1:1 needed.

## 2018-07-10 NOTE — ED BEHAVIORAL HEALTH ASSESSMENT NOTE - OTHER PAST PSYCHIATRIC HISTORY (INCLUDE DETAILS REGARDING ONSET, COURSE OF ILLNESS, INPATIENT/OUTPATIENT TREATMENT)
PPH Bipolar Disorder and Polysubstance abuse. He has a history of multiple past inpatient admissions last 6/6-6/12 2018 at [facility]. He has a history of multiple self reported suicide attempts last 1 year ago via OD.

## 2018-07-10 NOTE — ED PROVIDER NOTE - MEDICAL DECISION MAKING DETAILS
51 y/o M with h/o depression, noncompliant with meds x 1 week here with depression and suicidal thoughts.  Atraumatic exam, pt is calm, plan for psych consult.
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
## 2018-07-10 NOTE — ED BEHAVIORAL HEALTH ASSESSMENT NOTE - PSYCHIATRIC ISSUES AND PLAN (INCLUDE STANDING AND PRN MEDICATION)
Restart, Depakote 500mg P.o BID, Haldol 5mg PO/IM PRN, Ativan 2mg IM PRN, Hydroxyzine HCL 50mg PRN, Benadryl 50mg PO/IM PRN

## 2018-07-10 NOTE — ED BEHAVIORAL HEALTH ASSESSMENT NOTE - DESCRIPTION (FIRST USE, LAST USE, QUANTITY, FREQUENCY, DURATION)
history of alcohol use; denies recent patient reports episodic use- most recently a few days ago patient reports episodic use; denies history stimulants, patient reports episodic use; none recent history of alcohol use; denies use since release from detox 6/1 patient reports episodic use; most recently a few days ago

## 2018-07-10 NOTE — ED BEHAVIORAL HEALTH ASSESSMENT NOTE - PRIMARY DX
Bipolar disorder, current episode depressed, severe, without psychotic features Deferred condition on axis II

## 2018-07-10 NOTE — ED PROVIDER NOTE - OBJECTIVE STATEMENT
51 y/o M with h/o depression here with 1 day of feeling depressed and having suicidal thoughts.  Pt reports that he used to follow with a Dr Lopez at [facility], but has not been seen in "a long time."  Pt ran out of his depakote and seroquel 1 week ago.  Today he reports feeling sad and having thoughts of cutting himself.  He denies any self injurious behavior or trauma.  No other complaints.  No recent drug or alcohol use.

## 2018-07-10 NOTE — ED BEHAVIORAL HEALTH ASSESSMENT NOTE - CURRENT MEDICATION
Patient reports Depakote 500mg P.o BID and Seroquel 200mg at bedtime, last use 1 week ago Patient reports Depakote 500mg P.o BID and Seroquel 200mg at bedtime, last use 1 week ago    Patient stated he is not prescribed medication for history of HTN

## 2018-07-10 NOTE — ED BEHAVIORAL HEALTH ASSESSMENT NOTE - SUBSTANCE ISSUES AND PLAN (INCLUDE STANDING AND PRN MEDICATION)
Referral to inpatient rehab on discharge cocaine and marijuana use- no withdrawal protocol needed. Recommend referral to inpatient rehab on discharge
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
## 2018-07-10 NOTE — ED BEHAVIORAL HEALTH ASSESSMENT NOTE - TREATMENT
history of detox- most recently 5/30-6/1 at [facility] history of rehab

## 2018-07-10 NOTE — ED BEHAVIORAL HEALTH ASSESSMENT NOTE - RISK ASSESSMENT
Patient presents an imminent risk as evidence by suicidality with plan/intent outside of the hospital, history of suicide attempts, history of inpatient admissions, recent discharge from inpatient unit, substance abuse, history of legal issues, history of trauma, homeless, no social supports, poor judgement/insight, impulsive and unpredictable, no outpatient treaters, depression, hopelessness, worthlessness and non compliant with treatment.

## 2018-07-10 NOTE — ED BEHAVIORAL HEALTH ASSESSMENT NOTE - AXIS IV
Problems with primary support/Occupational problems/Economic problems/Problems with access to healthcare services/Problem related to social environment/Housing problems

## 2018-07-10 NOTE — ED BEHAVIORAL HEALTH ASSESSMENT NOTE - CASE SUMMARY
51 yo M presents with depressive symptoms and suicidal ideation with intent + plan to jump in front of train.  There is some question of secondary gain however we are unable to obtain reassuring collateral and therefore pt requires inpatient psychiatric hospitalization at this time for safety and support.

## 2018-07-10 NOTE — ED BEHAVIORAL HEALTH ASSESSMENT NOTE - DESCRIPTION
During course of ED visit patient was calm and cooperative. Patient was not aggressive or violent and did not require PRN medications.     Vital Signs Last 24 Hrs  T(C): 36.6 (10 Jul 2018 08:34), Max: 37.2 (09 Jul 2018 23:26)  T(F): 97.9 (10 Jul 2018 08:34), Max: 98.9 (09 Jul 2018 23:26)  HR: 58 (10 Jul 2018 08:34) (58 - 80)  BP: 142/94 (10 Jul 2018 08:34) (142/94 - 152/88)  BP(mean): --  RR: 16 (10 Jul 2018 08:34) (16 - 18)  SpO2: 96% (10 Jul 2018 08:34) (96% - 98%) Patient reports that he was in a group home for 4 years growing up. he reports that he went as far as the 12 grade in school htn

## 2018-07-10 NOTE — ED BEHAVIORAL HEALTH ASSESSMENT NOTE - SUICIDE RISK FACTORS
Substance abuse/dependence/Access to means (pills, firearms, etc.)/History of abuse/trauma/Hopelessness/Mood episode/Unable to engage in safety planning

## 2018-07-10 NOTE — ED BEHAVIORAL HEALTH ASSESSMENT NOTE - ACTIVATING EVENTS/STRESSORS
Pending incarceration or homelessness/Recent change in treartment/Non-compliant with treatment Other/Current or pending isolation/Non-compliant with treatment/Pending incarceration or homelessness/Recent change in treartment

## 2018-07-10 NOTE — ED ADULT NURSE NOTE - OBJECTIVE STATEMENT
52y M AaOx3 c/o depression and endorses thoughts of SI without a plan, pt denies HI. pt noted positive with auditory hallucinations but denies visual. pt calm and cooperative during initial assessment. pt states that he does not feel hopeless but does feel depressed and has low energy as of late. labs collected and sent , ekg completed.

## 2018-07-11 PROCEDURE — 99222 1ST HOSP IP/OBS MODERATE 55: CPT

## 2018-07-11 RX ORDER — QUETIAPINE FUMARATE 200 MG/1
200 TABLET, FILM COATED ORAL AT BEDTIME
Qty: 0 | Refills: 0 | Status: DISCONTINUED | OUTPATIENT
Start: 2018-07-11 | End: 2018-07-18

## 2018-07-11 RX ADMIN — DIVALPROEX SODIUM 500 MILLIGRAM(S): 500 TABLET, DELAYED RELEASE ORAL at 20:25

## 2018-07-11 RX ADMIN — DIVALPROEX SODIUM 500 MILLIGRAM(S): 500 TABLET, DELAYED RELEASE ORAL at 08:48

## 2018-07-11 RX ADMIN — QUETIAPINE FUMARATE 200 MILLIGRAM(S): 200 TABLET, FILM COATED ORAL at 20:25

## 2018-07-12 PROCEDURE — 99232 SBSQ HOSP IP/OBS MODERATE 35: CPT

## 2018-07-12 RX ADMIN — QUETIAPINE FUMARATE 200 MILLIGRAM(S): 200 TABLET, FILM COATED ORAL at 20:57

## 2018-07-12 RX ADMIN — DIVALPROEX SODIUM 500 MILLIGRAM(S): 500 TABLET, DELAYED RELEASE ORAL at 20:57

## 2018-07-12 RX ADMIN — DIVALPROEX SODIUM 500 MILLIGRAM(S): 500 TABLET, DELAYED RELEASE ORAL at 08:41

## 2018-07-13 PROCEDURE — 99232 SBSQ HOSP IP/OBS MODERATE 35: CPT

## 2018-07-13 RX ORDER — TRAZODONE HCL 50 MG
50 TABLET ORAL AT BEDTIME
Qty: 0 | Refills: 0 | Status: DISCONTINUED | OUTPATIENT
Start: 2018-07-13 | End: 2018-07-18

## 2018-07-13 RX ORDER — TUBERCULIN PURIFIED PROTEIN DERIVATIVE 5 [IU]/.1ML
5 INJECTION, SOLUTION INTRADERMAL ONCE
Qty: 0 | Refills: 0 | Status: COMPLETED | OUTPATIENT
Start: 2018-07-13 | End: 2018-07-13

## 2018-07-13 RX ADMIN — DIVALPROEX SODIUM 500 MILLIGRAM(S): 500 TABLET, DELAYED RELEASE ORAL at 09:00

## 2018-07-13 RX ADMIN — TUBERCULIN PURIFIED PROTEIN DERIVATIVE 5 UNIT(S): 5 INJECTION, SOLUTION INTRADERMAL at 16:16

## 2018-07-13 RX ADMIN — DIVALPROEX SODIUM 500 MILLIGRAM(S): 500 TABLET, DELAYED RELEASE ORAL at 22:00

## 2018-07-13 RX ADMIN — QUETIAPINE FUMARATE 200 MILLIGRAM(S): 200 TABLET, FILM COATED ORAL at 22:00

## 2018-07-14 LAB — VALPROATE SERPL-MCNC: 51.2 UG/ML — SIGNIFICANT CHANGE UP (ref 50–100)

## 2018-07-14 RX ORDER — DIVALPROEX SODIUM 500 MG/1
750 TABLET, DELAYED RELEASE ORAL
Qty: 0 | Refills: 0 | Status: DISCONTINUED | OUTPATIENT
Start: 2018-07-14 | End: 2018-07-18

## 2018-07-14 RX ADMIN — DIVALPROEX SODIUM 750 MILLIGRAM(S): 500 TABLET, DELAYED RELEASE ORAL at 22:36

## 2018-07-14 RX ADMIN — QUETIAPINE FUMARATE 200 MILLIGRAM(S): 200 TABLET, FILM COATED ORAL at 22:37

## 2018-07-14 RX ADMIN — DIVALPROEX SODIUM 500 MILLIGRAM(S): 500 TABLET, DELAYED RELEASE ORAL at 09:15

## 2018-07-15 PROCEDURE — 99232 SBSQ HOSP IP/OBS MODERATE 35: CPT

## 2018-07-15 RX ORDER — IBUPROFEN 200 MG
600 TABLET ORAL EVERY 8 HOURS
Qty: 0 | Refills: 0 | Status: DISCONTINUED | OUTPATIENT
Start: 2018-07-15 | End: 2018-07-18

## 2018-07-15 RX ADMIN — Medication 600 MILLIGRAM(S): at 18:44

## 2018-07-15 RX ADMIN — Medication 50 MILLIGRAM(S): at 16:03

## 2018-07-15 RX ADMIN — QUETIAPINE FUMARATE 200 MILLIGRAM(S): 200 TABLET, FILM COATED ORAL at 22:53

## 2018-07-15 RX ADMIN — TUBERCULIN PURIFIED PROTEIN DERIVATIVE 5 UNIT(S): 5 INJECTION, SOLUTION INTRADERMAL at 10:14

## 2018-07-15 RX ADMIN — Medication 600 MILLIGRAM(S): at 17:56

## 2018-07-15 RX ADMIN — DIVALPROEX SODIUM 750 MILLIGRAM(S): 500 TABLET, DELAYED RELEASE ORAL at 21:11

## 2018-07-15 RX ADMIN — DIVALPROEX SODIUM 750 MILLIGRAM(S): 500 TABLET, DELAYED RELEASE ORAL at 09:30

## 2018-07-16 PROCEDURE — 99232 SBSQ HOSP IP/OBS MODERATE 35: CPT

## 2018-07-16 RX ADMIN — DIVALPROEX SODIUM 750 MILLIGRAM(S): 500 TABLET, DELAYED RELEASE ORAL at 09:25

## 2018-07-16 RX ADMIN — DIVALPROEX SODIUM 750 MILLIGRAM(S): 500 TABLET, DELAYED RELEASE ORAL at 22:26

## 2018-07-16 RX ADMIN — QUETIAPINE FUMARATE 200 MILLIGRAM(S): 200 TABLET, FILM COATED ORAL at 22:26

## 2018-07-17 PROCEDURE — 99232 SBSQ HOSP IP/OBS MODERATE 35: CPT

## 2018-07-17 RX ORDER — QUETIAPINE FUMARATE 200 MG/1
1 TABLET, FILM COATED ORAL
Qty: 30 | Refills: 0 | OUTPATIENT
Start: 2018-07-17

## 2018-07-17 RX ORDER — DIVALPROEX SODIUM 500 MG/1
1 TABLET, DELAYED RELEASE ORAL
Qty: 60 | Refills: 0 | OUTPATIENT
Start: 2018-07-17

## 2018-07-17 RX ORDER — NICOTINE POLACRILEX 2 MG
2 GUM BUCCAL
Qty: 0 | Refills: 0 | Status: DISCONTINUED | OUTPATIENT
Start: 2018-07-17 | End: 2018-07-18

## 2018-07-17 RX ORDER — NICOTINE POLACRILEX 2 MG
1 GUM BUCCAL
Qty: 0 | Refills: 0 | Status: DISCONTINUED | OUTPATIENT
Start: 2018-07-17 | End: 2018-07-18

## 2018-07-17 RX ORDER — DIVALPROEX SODIUM 500 MG/1
3 TABLET, DELAYED RELEASE ORAL
Qty: 0 | Refills: 0 | COMMUNITY
Start: 2018-07-17

## 2018-07-17 RX ORDER — QUETIAPINE FUMARATE 200 MG/1
1 TABLET, FILM COATED ORAL
Qty: 0 | Refills: 0 | COMMUNITY
Start: 2018-07-17

## 2018-07-17 RX ADMIN — DIVALPROEX SODIUM 750 MILLIGRAM(S): 500 TABLET, DELAYED RELEASE ORAL at 21:35

## 2018-07-17 RX ADMIN — QUETIAPINE FUMARATE 200 MILLIGRAM(S): 200 TABLET, FILM COATED ORAL at 21:35

## 2018-07-17 RX ADMIN — Medication 600 MILLIGRAM(S): at 18:55

## 2018-07-17 RX ADMIN — Medication 600 MILLIGRAM(S): at 18:15

## 2018-07-17 RX ADMIN — DIVALPROEX SODIUM 750 MILLIGRAM(S): 500 TABLET, DELAYED RELEASE ORAL at 09:05

## 2018-07-18 VITALS — TEMPERATURE: 98 F

## 2018-07-18 LAB — VALPROATE SERPL-MCNC: 85.8 UG/ML — SIGNIFICANT CHANGE UP (ref 50–100)

## 2018-07-18 PROCEDURE — 99238 HOSP IP/OBS DSCHRG MGMT 30/<: CPT

## 2018-07-18 RX ADMIN — DIVALPROEX SODIUM 750 MILLIGRAM(S): 500 TABLET, DELAYED RELEASE ORAL at 09:08

## 2018-08-01 ENCOUNTER — INPATIENT (INPATIENT)
Facility: HOSPITAL | Age: 53
LOS: 8 days | Discharge: HOME | End: 2018-08-10
Attending: PSYCHIATRY & NEUROLOGY | Admitting: PSYCHIATRY & NEUROLOGY
Payer: MEDICAID

## 2018-08-01 VITALS
TEMPERATURE: 98 F | SYSTOLIC BLOOD PRESSURE: 123 MMHG | RESPIRATION RATE: 16 BRPM | DIASTOLIC BLOOD PRESSURE: 82 MMHG | OXYGEN SATURATION: 100 % | HEART RATE: 72 BPM

## 2018-08-01 DIAGNOSIS — F14.90 COCAINE USE, UNSPECIFIED, UNCOMPLICATED: ICD-10-CM

## 2018-08-01 DIAGNOSIS — F33.2 MAJOR DEPRESSIVE DISORDER, RECURRENT SEVERE WITHOUT PSYCHOTIC FEATURES: ICD-10-CM

## 2018-08-01 DIAGNOSIS — R69 ILLNESS, UNSPECIFIED: ICD-10-CM

## 2018-08-01 DIAGNOSIS — F10.20 ALCOHOL DEPENDENCE, UNCOMPLICATED: ICD-10-CM

## 2018-08-01 DIAGNOSIS — F31.30 BIPOLAR DISORDER, CURRENT EPISODE DEPRESSED, MILD OR MODERATE SEVERITY, UNSPECIFIED: ICD-10-CM

## 2018-08-01 PROBLEM — I10 ESSENTIAL (PRIMARY) HYPERTENSION: Chronic | Status: ACTIVE | Noted: 2018-03-26

## 2018-08-01 PROBLEM — B35.3 TINEA PEDIS: Chronic | Status: ACTIVE | Noted: 2018-03-26

## 2018-08-01 LAB
ALBUMIN SERPL ELPH-MCNC: 4.4 G/DL — SIGNIFICANT CHANGE UP (ref 3.3–5)
ALP SERPL-CCNC: 77 U/L — SIGNIFICANT CHANGE UP (ref 40–120)
ALT FLD-CCNC: 27 U/L — SIGNIFICANT CHANGE UP (ref 4–41)
APAP SERPL-MCNC: < 15 UG/ML — LOW (ref 15–25)
AST SERPL-CCNC: 32 U/L — SIGNIFICANT CHANGE UP (ref 4–40)
BASOPHILS # BLD AUTO: 0.04 K/UL — SIGNIFICANT CHANGE UP (ref 0–0.2)
BASOPHILS NFR BLD AUTO: 0.5 % — SIGNIFICANT CHANGE UP (ref 0–2)
BILIRUB SERPL-MCNC: 0.3 MG/DL — SIGNIFICANT CHANGE UP (ref 0.2–1.2)
BUN SERPL-MCNC: 13 MG/DL — SIGNIFICANT CHANGE UP (ref 7–23)
CALCIUM SERPL-MCNC: 9.3 MG/DL — SIGNIFICANT CHANGE UP (ref 8.4–10.5)
CHLORIDE SERPL-SCNC: 96 MMOL/L — LOW (ref 98–107)
CO2 SERPL-SCNC: 27 MMOL/L — SIGNIFICANT CHANGE UP (ref 22–31)
CREAT SERPL-MCNC: 0.8 MG/DL — SIGNIFICANT CHANGE UP (ref 0.5–1.3)
EOSINOPHIL # BLD AUTO: 0.44 K/UL — SIGNIFICANT CHANGE UP (ref 0–0.5)
EOSINOPHIL NFR BLD AUTO: 5 % — SIGNIFICANT CHANGE UP (ref 0–6)
ETHANOL BLD-MCNC: < 10 MG/DL — SIGNIFICANT CHANGE UP
GLUCOSE SERPL-MCNC: 98 MG/DL — SIGNIFICANT CHANGE UP (ref 70–99)
HCT VFR BLD CALC: 39.9 % — SIGNIFICANT CHANGE UP (ref 39–50)
HGB BLD-MCNC: 13 G/DL — SIGNIFICANT CHANGE UP (ref 13–17)
IMM GRANULOCYTES # BLD AUTO: 0.02 # — SIGNIFICANT CHANGE UP
IMM GRANULOCYTES NFR BLD AUTO: 0.2 % — SIGNIFICANT CHANGE UP (ref 0–1.5)
LYMPHOCYTES # BLD AUTO: 3.36 K/UL — HIGH (ref 1–3.3)
LYMPHOCYTES # BLD AUTO: 38.3 % — SIGNIFICANT CHANGE UP (ref 13–44)
MCHC RBC-ENTMCNC: 28.8 PG — SIGNIFICANT CHANGE UP (ref 27–34)
MCHC RBC-ENTMCNC: 32.6 % — SIGNIFICANT CHANGE UP (ref 32–36)
MCV RBC AUTO: 88.5 FL — SIGNIFICANT CHANGE UP (ref 80–100)
MONOCYTES # BLD AUTO: 0.81 K/UL — SIGNIFICANT CHANGE UP (ref 0–0.9)
MONOCYTES NFR BLD AUTO: 9.2 % — SIGNIFICANT CHANGE UP (ref 2–14)
NEUTROPHILS # BLD AUTO: 4.1 K/UL — SIGNIFICANT CHANGE UP (ref 1.8–7.4)
NEUTROPHILS NFR BLD AUTO: 46.8 % — SIGNIFICANT CHANGE UP (ref 43–77)
NRBC # FLD: 0 — SIGNIFICANT CHANGE UP
PLATELET # BLD AUTO: 189 K/UL — SIGNIFICANT CHANGE UP (ref 150–400)
PMV BLD: 12.8 FL — SIGNIFICANT CHANGE UP (ref 7–13)
POTASSIUM SERPL-MCNC: 3.8 MMOL/L — SIGNIFICANT CHANGE UP (ref 3.5–5.3)
POTASSIUM SERPL-SCNC: 3.8 MMOL/L — SIGNIFICANT CHANGE UP (ref 3.5–5.3)
PROT SERPL-MCNC: 7.9 G/DL — SIGNIFICANT CHANGE UP (ref 6–8.3)
RBC # BLD: 4.51 M/UL — SIGNIFICANT CHANGE UP (ref 4.2–5.8)
RBC # FLD: 13.7 % — SIGNIFICANT CHANGE UP (ref 10.3–14.5)
SALICYLATES SERPL-MCNC: < 5 MG/DL — LOW (ref 15–30)
SODIUM SERPL-SCNC: 138 MMOL/L — SIGNIFICANT CHANGE UP (ref 135–145)
TSH SERPL-MCNC: 2.74 UIU/ML — SIGNIFICANT CHANGE UP (ref 0.27–4.2)
VALPROATE SERPL-MCNC: 8.9 UG/ML — LOW (ref 50–100)
WBC # BLD: 8.77 K/UL — SIGNIFICANT CHANGE UP (ref 3.8–10.5)
WBC # FLD AUTO: 8.77 K/UL — SIGNIFICANT CHANGE UP (ref 3.8–10.5)

## 2018-08-01 PROCEDURE — 99285 EMERGENCY DEPT VISIT HI MDM: CPT

## 2018-08-01 RX ORDER — HYDROXYZINE HCL 10 MG
50 TABLET ORAL EVERY 4 HOURS
Qty: 0 | Refills: 0 | Status: DISCONTINUED | OUTPATIENT
Start: 2018-08-01 | End: 2018-08-06

## 2018-08-01 RX ORDER — HALOPERIDOL DECANOATE 100 MG/ML
5 INJECTION INTRAMUSCULAR ONCE
Qty: 0 | Refills: 0 | Status: DISCONTINUED | OUTPATIENT
Start: 2018-08-01 | End: 2018-08-10

## 2018-08-01 RX ORDER — DIPHENHYDRAMINE HCL 50 MG
50 CAPSULE ORAL ONCE
Qty: 0 | Refills: 0 | Status: DISCONTINUED | OUTPATIENT
Start: 2018-08-01 | End: 2018-08-10

## 2018-08-01 RX ORDER — HALOPERIDOL DECANOATE 100 MG/ML
5 INJECTION INTRAMUSCULAR EVERY 6 HOURS
Qty: 0 | Refills: 0 | Status: DISCONTINUED | OUTPATIENT
Start: 2018-08-01 | End: 2018-08-10

## 2018-08-01 RX ORDER — DIPHENHYDRAMINE HCL 50 MG
50 CAPSULE ORAL EVERY 6 HOURS
Qty: 0 | Refills: 0 | Status: DISCONTINUED | OUTPATIENT
Start: 2018-08-01 | End: 2018-08-10

## 2018-08-01 RX ORDER — QUETIAPINE FUMARATE 200 MG/1
200 TABLET, FILM COATED ORAL AT BEDTIME
Qty: 0 | Refills: 0 | Status: DISCONTINUED | OUTPATIENT
Start: 2018-08-01 | End: 2018-08-10

## 2018-08-01 RX ORDER — TRAZODONE HCL 50 MG
50 TABLET ORAL AT BEDTIME
Qty: 0 | Refills: 0 | Status: DISCONTINUED | OUTPATIENT
Start: 2018-08-01 | End: 2018-08-06

## 2018-08-01 RX ORDER — IBUPROFEN 200 MG
400 TABLET ORAL EVERY 6 HOURS
Qty: 0 | Refills: 0 | Status: DISCONTINUED | OUTPATIENT
Start: 2018-08-01 | End: 2018-08-10

## 2018-08-01 RX ORDER — DIVALPROEX SODIUM 500 MG/1
500 TABLET, DELAYED RELEASE ORAL EVERY 12 HOURS
Qty: 0 | Refills: 0 | Status: DISCONTINUED | OUTPATIENT
Start: 2018-08-01 | End: 2018-08-02

## 2018-08-01 RX ADMIN — QUETIAPINE FUMARATE 200 MILLIGRAM(S): 200 TABLET, FILM COATED ORAL at 21:35

## 2018-08-01 RX ADMIN — Medication 50 MILLIGRAM(S): at 21:35

## 2018-08-01 RX ADMIN — DIVALPROEX SODIUM 500 MILLIGRAM(S): 500 TABLET, DELAYED RELEASE ORAL at 21:35

## 2018-08-01 NOTE — ED BEHAVIORAL HEALTH ASSESSMENT NOTE - OTHER PAST PSYCHIATRIC HISTORY (INCLUDE DETAILS REGARDING ONSET, COURSE OF ILLNESS, INPATIENT/OUTPATIENT TREATMENT)
PPH Bipolar Disorder and Polysubstance abuse. He has a history of multiple past inpatient admissions last 6/6-6/12 2018 at [facility]. He has a history of multiple self reported suicide attempts last 1 year ago via OD.

## 2018-08-01 NOTE — ED BEHAVIORAL HEALTH ASSESSMENT NOTE - DESCRIPTION
in good behavioral control  ICU Vital Signs Last 24 Hrs  T(C): 36.9 (01 Aug 2018 06:09), Max: 36.9 (01 Aug 2018 06:09)  T(F): 98.4 (01 Aug 2018 06:09), Max: 98.4 (01 Aug 2018 06:09)  HR: 72 (01 Aug 2018 06:09) (72 - 72)  BP: 123/82 (01 Aug 2018 06:09) (123/82 - 123/82)  BP(mean): --  ABP: --  ABP(mean): --  RR: 16 (01 Aug 2018 06:09) (16 - 16)  SpO2: 100% (01 Aug 2018 06:09) (100% - 100%) anxious, in good behavioral control  ICU Vital Signs Last 24 Hrs  T(C): 36.9 (01 Aug 2018 06:09), Max: 36.9 (01 Aug 2018 06:09)  T(F): 98.4 (01 Aug 2018 06:09), Max: 98.4 (01 Aug 2018 06:09)  HR: 72 (01 Aug 2018 06:09) (72 - 72)  BP: 123/82 (01 Aug 2018 06:09) (123/82 - 123/82)  BP(mean): --  ABP: --  ABP(mean): --  RR: 16 (01 Aug 2018 06:09) (16 - 16)  SpO2: 100% (01 Aug 2018 06:09) (100% - 100%) does not take meds for HTN • Description	Patient reports that he was in a group home for 4 years growing up. he reports that he went as far as the 12 grade in school
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
## 2018-08-01 NOTE — ED ADULT NURSE NOTE - CHIEF COMPLAINT QUOTE
SI with plan to cut neck or arms. Denies drug/alcohol use today. Denies HI, CP, SOB, weakness. Currently calm and cooperative in triage. States wanted to jump in front of a train earlier today. Hx Bipolar, Depression,   Patient walked over to   MD lamar cleared for

## 2018-08-01 NOTE — ED BEHAVIORAL HEALTH ASSESSMENT NOTE - SUBSTANCE ISSUES AND PLAN (INCLUDE STANDING AND PRN MEDICATION)
Pt' s CIWA score in ED was 0, and BAL was <10, risk of acute withdrawal is low, recent admission pt did not require taper. Given extent of alcohol use

## 2018-08-01 NOTE — ED BEHAVIORAL HEALTH NOTE - BEHAVIORAL HEALTH NOTE
Kenjir received a call from [facility], No rehab beds available.  Patient is not in withdrawal for a detox.  Writer was informed patient would require inpatient psychiatric admisson.  Kenjir called [facility] and patient was assigned to [facility].

## 2018-08-01 NOTE — ED PROVIDER NOTE - CONSTITUTIONAL, MLM
normal... Well appearing, unkempt, awake, alert, oriented to person, place, time/situation and in no apparent distress.

## 2018-08-01 NOTE — ED BEHAVIORAL HEALTH ASSESSMENT NOTE - AXIS IV
Problems with access to healthcare services/Problem related to social environment/Housing problems/Problems with primary support/Occupational problems

## 2018-08-01 NOTE — ED BEHAVIORAL HEALTH ASSESSMENT NOTE - PSYCHIATRIC ISSUES AND PLAN (INCLUDE STANDING AND PRN MEDICATION)
Mood disorder: seroquel 200mg hs, trazodone 50mg hs, restart depakote 500mg bid prn's hydroxyzine 50mg Mood disorder: seroquel 200mg hs, trazodone 50mg hs, restart depakote 500mg bid prn's anxiety hydroxyzine 50mg Haldol 5mg PO/IM PRN, Ativan 2mg IM PRN, Hydroxyzine HCL 50mg PRN, Benadryl 50mg

## 2018-08-01 NOTE — ED ADULT NURSE NOTE - NSIMPLEMENTINTERV_GEN_ALL_ED
Implemented All Universal Safety Interventions:  [place] to call system. Call bell, personal items and telephone within reach. Instruct patient to call for assistance. Room bathroom lighting operational. Non-slip footwear when patient is off stretcher. Physically safe environment: no spills, clutter or unnecessary equipment. Stretcher in lowest position, wheels locked, appropriate side rails in place.

## 2018-08-01 NOTE — ED BEHAVIORAL HEALTH ASSESSMENT NOTE - SUMMARY
Patient is a 52 year old single male; undomiciled staying with a friend; noncaregiver; unemployed; PPH of polysubstance abuse and Bipolar disorder; multiple prior hospitalizations (had 2 hospitalizations for depression and SI since June 2018 (most recent [facility] Low 6 7/10/18-7/18/18) ; self reported multiple reported SA last year by overdose ;no known history of violence or aggression;  active alcohol dependency and crack cocaine abuse; no known history of complicated withdrawal; PMH of HTN; self presented to ED ; presenting with suicidal ideation with thoughts to jump in front of train; in the setting of depression and ongoing substance abuse and poor medication compliance,  depakote level 8.9. On interview, the patient is cooperative, anxious, depressed affect, linear thought processes, denies AVH or delusions. He reports some insight into the role of ongoing substance abuse perpetuating current mental health impairments, suicidality and social difficulties, and expressed some motivation for change. The patient reports of a hx of withdrawal sx (no seizures) and would benefit from inpatient rehab given the impact of patient's substance abuse on worsening depression, poor self care and medication non compliance, it is unlikely that pt condition will meaningfully improve without abstinence, and he will continue to be at high risk. DX: bipolar depression vs substance induced mood disorder given prominent cocaine abuse, alcohol use disorder. Patient is a 52 year old single male; undomiciled staying with a friend; noncaregiver; unemployed; PPH of polysubstance abuse and Bipolar disorder; multiple prior hospitalizations (had 2 hospitalizations for depression and SI since June 2018 (most recent [facility] Low 6 7/10/18-7/18/18) ; self reported multiple reported SA last year by overdose ;no known history of violence or aggression;  active alcohol dependency and crack cocaine abuse; no known history of complicated withdrawal; PMH of HTN; self presented to ED ; presenting with suicidal ideation with thoughts to jump in front of train; in the setting of depression and ongoing substance abuse and poor medication compliance,  depakote level 8.9. On interview, the patient is cooperative, anxious, depressed affect, linear thought processes, denies AVH or delusions, he reports ongoing SI with plan to jump in front of train or cut himself.. He reports some insight into the role of ongoing substance abuse perpetuating current mental health impairments, suicidality and social difficulties, and expressed some motivation for change. The patient reports of a hx of withdrawal sx (no seizures) and would benefit from inpatient rehab given the impact of patient's substance abuse on worsening depression, poor self care and medication non compliance, it is unlikely that pt condition will meaningfully improve without abstinence, and he will continue to be at high risk. DX: bipolar depression vs substance induced mood disorder given prominent cocaine abuse, alcohol use disorder. Attempted to get rehab bed in [facility] but as patient is not in acute withdrawal, he was declined. Pt requires inpatient admission for ongoing SI, inpatient team may wish to discuss referral to BINA program with patient after he is psychiatrically stablized. Patient is a 52 year old single male; undomiciled staying with a friend; noncaregiver; unemployed; PPH of polysubstance abuse and Bipolar disorder; multiple prior hospitalizations (had 2 hospitalizations for depression and SI since June 2018 (most recent [facility] Low 6 7/10/18-7/18/18) ; self reported multiple reported SA last year by overdose ;no known history of violence or aggression; hx of incarceration at [facility] for kaylin last year active alcohol abuse and crack cocaine abuse; no known history of complicated withdrawal; PMH of HTN; self presented to ED ; presenting with suicidal ideation with thoughts to jump in front of train; in the setting of depression and ongoing substance abuse and poor medication compliance,  depakote level 8.9. On interview, the patient is cooperative, anxious, depressed affect, linear thought processes, denies AVH or delusions, he reports ongoing SI with plan to jump in front of train or cut himself.. He reports some insight into the role of ongoing substance abuse perpetuating current mental health impairments, suicidality and social difficulties, and expressed some motivation for change. The patient reports of a hx of withdrawal sx (no seizures) and would benefit from inpatient rehab given the impact of patient's substance abuse on worsening depression, poor self care and medication non compliance, it is unlikely that pt condition will meaningfully improve without abstinence, and he will continue to be at high risk. DX: bipolar depression vs substance induced mood disorder given prominent cocaine abuse, alcohol use disorder. Attempted to get rehab bed in [facility] but as patient is not in acute withdrawal, he was declined. Pt requires inpatient admission for ongoing SI, inpatient team may wish to discuss referral to BINA program with patient after he is psychiatrically stablized.

## 2018-08-01 NOTE — ED BEHAVIORAL HEALTH ASSESSMENT NOTE - CURRENT MEDICATION
depakote 750mg bid, seroquel 200mg qhs, trazodone 50mg hs -has been poorly compliant depakote level 8.9

## 2018-08-01 NOTE — ED PROVIDER NOTE - OBJECTIVE STATEMENT
52M PMH of Bipolar d/o, cocaine and marijuana abuse, psychiatric hospitalizations p/w c/o suicidal ideation. Pt plans to jump in front of a train. Last cocaine/marijuna use 3 days ago. No regular alcohol use. 1 PPD tobacco use. Denies other medical or surgical hx. No HA, CP, SOB, fever or abdominal pain.

## 2018-08-01 NOTE — ED BEHAVIORAL HEALTH ASSESSMENT NOTE - DIFFERENTIAL
bipolar disorder-currently depressed vs substance induced mood disorder  polysubstance abuse  alcohol use disorder

## 2018-08-01 NOTE — ED BEHAVIORAL HEALTH NOTE - BEHAVIORAL HEALTH NOTE
Writer was informed patient might require Detox admission to [facility].  Writer contacted [facility] and spoke to Kala who will review behavioral health assessment for possible transfer.

## 2018-08-01 NOTE — ED BEHAVIORAL HEALTH ASSESSMENT NOTE - DESCRIPTION (FIRST USE, LAST USE, QUANTITY, FREQUENCY, DURATION)
stimulants-intermittent use 12 beers per day plus pint liquor intermittent use uses approx 200 crack per week

## 2018-08-01 NOTE — ED BEHAVIORAL HEALTH ASSESSMENT NOTE - RISK ASSESSMENT
Patient presents to the ER in the context of SI/depression. Patient endorses suicidal ideation to jump in front of a train and continued depression due to multiple psychosocial stressors. Acute risks include recent discharge from hospital, active substance abuse, poor social supports, homelessness, depressed mood, poor sleep and anxiety. He is unable to safety plan outside of the hospital environment. He is impulsive and unpredictable with poor judgement and insight. Patient presents an imminent risk to self and requires inpatient admission for safety and stabilization. Patient presents to the ER in the context of SI/depression. Patient endorses suicidal ideation to jump in front of a train and continued depression due to multiple psychosocial stressors. Acute risks include recent discharge from hospital, active substance abuse, poor social supports, homelessness, depressed mood, poor sleep and anxiety. He is unable to safety plan outside of the hospital environment. He is impulsive and unpredictable with poor judgement and insight. Patient presents an imminent risk to self and requires inpatient psychiatric admission for safety and stabilization.

## 2018-08-01 NOTE — ED ADULT NURSE REASSESSMENT NOTE - NS ED NURSE REASSESS COMMENT FT1
Received report from night RN pt lying on stretcher in nad   denies Si/Hi at present eval on going. Received report from night RN pt lying on stretcher in nad  c/o depression & Si pt denies Hi/AVh  at present eval on going.
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
## 2018-08-01 NOTE — ED BEHAVIORAL HEALTH ASSESSMENT NOTE - LEGAL HISTORY
• Legal History	As per chart, patient was in [facility] about 1 year ago for 40 days

## 2018-08-01 NOTE — ED BEHAVIORAL HEALTH ASSESSMENT NOTE - HPI (INCLUDE ILLNESS QUALITY, SEVERITY, DURATION, TIMING, CONTEXT, MODIFYING FACTORS, ASSOCIATED SIGNS AND SYMPTOMS)
Patient is a 52 year old single male; undomiciled staying with a friend; noncaregiver; unemployed; PPH of; no prior hospitalizations; no known suicide attempts; no known history of violence or arrests; no active substance abuse or known history of complicated withdrawal; PMH of; brought in by EMS; called by ; presenting with; in the setting of     The patient denies depression or other significant mood symptoms.  Specifically, the patient denies manic symptoms, past and present.  The patient denies auditory or visual hallucinations, and no delusions could be elicited on direct questioning.  The patient denies suicidal idation, homicidal ideation, intent, or plan.  ] Patient is a 52 year old single male; undomiciled staying with a friend; noncaregiver; unemployed; PPH of; no prior hospitalizations; self reported multiple reported SA last year by overdose;; no known history of violence or aggression;  active alcohol dependency and crack cocaine abuse; no known history of complicated withdrawal; PMH of; brought in by EMS; called by ; presenting with; in the setting of     The patient reports that after his last hospitalization he did not follow up with outpatient treatment  and resumed abusing drugs and alcohol. He states "I need to change...I have to stop using." Patient states that his mood is depressed , he endorses anhedonia, hopelessness, poor energy. He reports suicidal ideation with plan to jump in front of train or cut himself, he denies any intent to hurt himself in the ED. He states that he wants help, and expressed interest in MCCLELLAND and Vivitrol, stating "I need an injection".  Specifically, the patient denies manic symptoms, past and present.  The patient denies auditory or visual hallucinations, and no delusions could be elicited on direct questioning.  The patient denies homicidal ideation, intent, or plan.  ] Patient is a 52 year old single male; undomiciled staying with a friend; noncaregiver; unemployed; PPH of polysubstance abuse and Bipolar disorder; multiple prior hospitalizations (had 2 hospitalizations for depression and SI since June 2018 (most recent [facility] Low 6 7/10/18-7/18/18) ; self reported multiple reported SA last year by overdose;no known history of violence or aggression;  active alcohol dependency and crack cocaine abuse; no known history of complicated withdrawal; PMH of HTN; self presented to ED ; presenting with mesa; in the setting of     The patient reports that after his last hospitalization he did not follow up with outpatient treatment  and resumed abusing drugs and alcohol. He states "I need to change. ..I have to stop using." Patient states that his mood is depressed , he endorses anhedonia, hopelessness, poor energy. He reports suicidal ideation with plan to jump in front of train or cut himself, he denies any intent to hurt himself in the ED. He states that he wants help, and expressed interest in MCCLELLAND and Vivitrol, stating "I need an injection".  Specifically, the patient denies manic symptoms, past and present.  The patient denies auditory or visual hallucinations, and no delusions could be elicited on direct questioning.  He reports intermittent derogatory AH telling him "you're worthless". The patient denies homicidal ideation, intent, or plan. He endorses drinking at least 12 beers a day plus a pint of liquor. He states he uses up to $200 of crack cocaine every few days and supports his habit by "hustling" and "odd jobs". He denies any outstanding warrants for arrest. He reports alcohol withdrawal sx-he has tremors and sick stomach if he does not drink alcohol. Patient is a 52 year old single male; undomiciled staying with a friend; noncaregiver; unemployed; PPH of polysubstance abuse and Bipolar disorder; multiple prior hospitalizations (had 2 hospitalizations for depression and SI since June 2018 (most recent [facility] Low 6 7/10/18-7/18/18) ; self reported multiple reported SA last year by overdose ;no known history of violence or aggression;  active alcohol dependency and crack cocaine abuse; no known history of complicated withdrawal; PMH of HTN; self presented to ED ; presenting with suicidal ideation with thoughts to jump in front of train; in the setting of depression and ongoing substance abuse and poor medication compliance,  depakote level 8.9.    The patient reports that after his last hospitalization he did not follow up with outpatient treatment  and resumed abusing drugs and alcohol. He states "I need to change. ..I have to stop using." Patient states that his mood is depressed , he endorses anhedonia, hopelessness, poor energy. He reports suicidal ideation with plan to jump in front of train or cut himself, he denies any intent to hurt himself in the ED. He states that he wants help, and expressed interest in MCCLELLAND and Vivitrol, stating "I need an injection".  Specifically, the patient denies manic symptoms, past and present.  The patient denies auditory or visual hallucinations, and no delusions could be elicited on direct questioning.  He reports intermittent derogatory AH telling him "you're worthless". The patient denies homicidal ideation, intent, or plan. He endorses drinking at least 12 beers a day plus a pint of liquor. He states he uses up to $200 of crack cocaine every few days and supports his habit by "hustling" and "odd jobs". He has a history of rehab/detox at [facility] for alcohol use. He denies history of DTs or seizures. He denies any outstanding warrants for arrest. He reports alcohol withdrawal sx-he has tremors and sick stomach if he does not drink alcohol. Patient is a 52 year old single male; undomiciled staying with a friend; noncaregiver; unemployed; PPH of polysubstance abuse and Bipolar disorder; multiple prior hospitalizations (had 2 hospitalizations for depression and SI since June 2018 (most recent [facility] Low 6 7/10/18-7/18/18) ; self reported multiple reported SA last year by overdose ;no known history of violence or aggression;  active alcohol dependency and crack cocaine abuse; no known history of complicated withdrawal; PMH of HTN; self presented to ED ; presenting with suicidal ideation with thoughts to jump in front of train; in the setting of depression and ongoing substance abuse and poor medication compliance,  depakote level 8.9.    On interview, the patient is cooperative, anxious, depressed affect, linear thought processes, denies AVH or delusions. The patient reports that after his last hospitalization he did not follow up with outpatient treatment  and resumed abusing drugs and alcohol. He states "I need to change. ..I have to stop using." Patient states that his mood is depressed , he endorses anhedonia, insomnia, poor appetite, hopelessness, poor energy. He reports suicidal ideation with plan to jump in front of train or cut himself, he denies any intent to hurt himself in the ED. He states that he wants help, and expressed interest in MCCLELLAND and Vivitrol, stating "I need an injection".  Specifically, the patient denies manic symptoms, past and present.  The patient denies auditory or visual hallucinations, and no delusions could be elicited on direct questioning.  He reports intermittent derogatory AH telling him "you're worthless". The patient denies homicidal ideation, intent, or plan. He endorses drinking at least 12 beers a day plus a pint of liquor. He states he uses up to $200 of crack cocaine every few days and supports his habit by "hustling" and "odd jobs". He has a history of rehab/detox at [facility] for alcohol use. He denies history of DTs or seizures. He denies any outstanding warrants for arrest. He reports alcohol withdrawal sx-he has tremors and sick stomach if he does not drink alcohol. Patient is a 52 year old single male; undomiciled staying with a friend; noncaregiver; unemployed; PPH of polysubstance abuse and Bipolar disorder; multiple prior hospitalizations (had 2 hospitalizations for depression and SI since June 2018 (most recent [facility] Low 6 7/10/18-7/18/18) ; self reported multiple reported SA last year by overdose ;no known history of violence or aggression;  active alcohol dependency and crack cocaine abuse; no known history of complicated withdrawal; PMH of HTN; self presented to ED ; presenting with suicidal ideation with thoughts to jump in front of train; in the setting of depression and ongoing substance abuse and poor medication compliance,  depakote level 8.9. BAL <10.    On interview, the patient is cooperative, anxious, depressed affect, linear thought processes, denies AVH or delusions. The patient reports that after his last hospitalization he did not follow up with outpatient treatment  and resumed abusing drugs and alcohol. He states "I need to change. ..I have to stop using." Patient states that his mood is depressed , he endorses anhedonia, insomnia, poor appetite, hopelessness, poor energy. He reports suicidal ideation with plan to jump in front of train or cut himself, he states that he feels very impulsive. He states that he wants help, and expressed interest in MCCLELLAND and Vivitrol, stating "I need an injection".  Specifically, the patient denies manic symptoms, past and present.  The patient denies auditory or visual hallucinations, and no delusions could be elicited on direct questioning.  He reports intermittent derogatory AH telling him "you're worthless". The patient denies homicidal ideation, intent, or plan. He endorses drinking at least 12 beers a day plus a pint of liquor. He states he uses up to $200 of crack cocaine every few days and supports his habit by "hustling" and "odd jobs". He has a history of rehab/detox at [facility] for alcohol use. He denies history of DTs or seizures. He denies any outstanding warrants for arrest. Patient is a 52 year old single male; undomiciled staying with a friend; noncaregiver; unemployed; PPH of polysubstance abuse and Bipolar disorder; multiple prior hospitalizations (had 2 hospitalizations for depression and SI since June 2018 (most recent [facility] Low 6 7/10/18-7/18/18) ; self reported multiple reported SA last year by overdose ;no known history of violence or aggression; hx of incarceration at [facility] for kaylin last year;   active alcohol abuse and crack cocaine abuse; no known history of complicated withdrawal; PMH of HTN; self presented to ED ; presenting with suicidal ideation with thoughts to jump in front of train; in the setting of depression and ongoing substance abuse and poor medication compliance,  depakote level 8.9. BAL <10.    On interview, the patient is cooperative, anxious, depressed affect, linear thought processes, denies AVH or delusions. The patient reports that after his last hospitalization he did not follow up with outpatient treatment  and resumed abusing drugs and alcohol. He states "I need to change. ..I have to stop using." Patient states that his mood is depressed , he endorses anhedonia, insomnia, poor appetite, hopelessness, poor energy. He reports suicidal ideation with plan to jump in front of train or cut himself, he states that he feels very impulsive. He states that he wants help, and expressed interest in MCCLELLAND and Vivitrol, stating "I need an injection".  Specifically, the patient denies manic symptoms, past and present.  The patient denies auditory or visual hallucinations, and no delusions could be elicited on direct questioning.  He reports intermittent derogatory AH telling him "you're worthless". The patient denies homicidal ideation, intent, or plan. He endorses drinking at least 12 beers a day plus a pint of liquor. He states he uses up to $200 of crack cocaine every few days and supports his habit by "hustling" and "odd jobs". He has a history of rehab/detox at [facility] for alcohol use. He denies history of DTs or seizures. He denies any outstanding warrants for arrest.

## 2018-08-02 RX ORDER — VENLAFAXINE HCL 75 MG
75 CAPSULE, EXT RELEASE 24 HR ORAL DAILY
Qty: 0 | Refills: 0 | Status: DISCONTINUED | OUTPATIENT
Start: 2018-08-04 | End: 2018-08-07

## 2018-08-02 RX ORDER — DIVALPROEX SODIUM 500 MG/1
500 TABLET, DELAYED RELEASE ORAL AT BEDTIME
Qty: 0 | Refills: 0 | Status: COMPLETED | OUTPATIENT
Start: 2018-08-02 | End: 2018-08-02

## 2018-08-02 RX ORDER — VENLAFAXINE HCL 75 MG
37.5 CAPSULE, EXT RELEASE 24 HR ORAL DAILY
Qty: 0 | Refills: 0 | Status: COMPLETED | OUTPATIENT
Start: 2018-08-03 | End: 2018-08-03

## 2018-08-02 RX ORDER — DIVALPROEX SODIUM 500 MG/1
1000 TABLET, DELAYED RELEASE ORAL AT BEDTIME
Qty: 0 | Refills: 0 | Status: DISCONTINUED | OUTPATIENT
Start: 2018-08-03 | End: 2018-08-10

## 2018-08-02 RX ADMIN — DIVALPROEX SODIUM 500 MILLIGRAM(S): 500 TABLET, DELAYED RELEASE ORAL at 22:25

## 2018-08-02 RX ADMIN — DIVALPROEX SODIUM 500 MILLIGRAM(S): 500 TABLET, DELAYED RELEASE ORAL at 09:49

## 2018-08-02 RX ADMIN — QUETIAPINE FUMARATE 200 MILLIGRAM(S): 200 TABLET, FILM COATED ORAL at 22:26

## 2018-08-02 RX ADMIN — Medication 50 MILLIGRAM(S): at 22:26

## 2018-08-03 PROCEDURE — 99223 1ST HOSP IP/OBS HIGH 75: CPT

## 2018-08-03 RX ADMIN — DIVALPROEX SODIUM 1000 MILLIGRAM(S): 500 TABLET, DELAYED RELEASE ORAL at 20:45

## 2018-08-03 RX ADMIN — QUETIAPINE FUMARATE 200 MILLIGRAM(S): 200 TABLET, FILM COATED ORAL at 20:45

## 2018-08-04 PROCEDURE — 99232 SBSQ HOSP IP/OBS MODERATE 35: CPT

## 2018-08-04 RX ADMIN — Medication 75 MILLIGRAM(S): at 08:19

## 2018-08-04 RX ADMIN — QUETIAPINE FUMARATE 200 MILLIGRAM(S): 200 TABLET, FILM COATED ORAL at 22:11

## 2018-08-04 RX ADMIN — DIVALPROEX SODIUM 1000 MILLIGRAM(S): 500 TABLET, DELAYED RELEASE ORAL at 22:12

## 2018-08-05 PROCEDURE — 99232 SBSQ HOSP IP/OBS MODERATE 35: CPT

## 2018-08-05 RX ADMIN — Medication 75 MILLIGRAM(S): at 09:24

## 2018-08-05 RX ADMIN — QUETIAPINE FUMARATE 200 MILLIGRAM(S): 200 TABLET, FILM COATED ORAL at 21:05

## 2018-08-05 RX ADMIN — DIVALPROEX SODIUM 1000 MILLIGRAM(S): 500 TABLET, DELAYED RELEASE ORAL at 21:05

## 2018-08-06 RX ORDER — TRAZODONE HCL 50 MG
50 TABLET ORAL AT BEDTIME
Qty: 0 | Refills: 0 | Status: DISCONTINUED | OUTPATIENT
Start: 2018-08-06 | End: 2018-08-10

## 2018-08-06 RX ORDER — HYDROXYZINE HCL 10 MG
50 TABLET ORAL EVERY 6 HOURS
Qty: 0 | Refills: 0 | Status: DISCONTINUED | OUTPATIENT
Start: 2018-08-06 | End: 2018-08-10

## 2018-08-06 RX ADMIN — Medication 75 MILLIGRAM(S): at 08:25

## 2018-08-06 RX ADMIN — DIVALPROEX SODIUM 1000 MILLIGRAM(S): 500 TABLET, DELAYED RELEASE ORAL at 21:03

## 2018-08-06 RX ADMIN — QUETIAPINE FUMARATE 200 MILLIGRAM(S): 200 TABLET, FILM COATED ORAL at 21:03

## 2018-08-07 RX ORDER — VENLAFAXINE HCL 75 MG
150 CAPSULE, EXT RELEASE 24 HR ORAL DAILY
Qty: 0 | Refills: 0 | Status: DISCONTINUED | OUTPATIENT
Start: 2018-08-08 | End: 2018-08-10

## 2018-08-07 RX ADMIN — Medication 75 MILLIGRAM(S): at 08:33

## 2018-08-08 RX ADMIN — Medication 150 MILLIGRAM(S): at 09:15

## 2018-08-08 RX ADMIN — DIVALPROEX SODIUM 1000 MILLIGRAM(S): 500 TABLET, DELAYED RELEASE ORAL at 21:48

## 2018-08-08 RX ADMIN — QUETIAPINE FUMARATE 200 MILLIGRAM(S): 200 TABLET, FILM COATED ORAL at 21:47

## 2018-08-09 VITALS — SYSTOLIC BLOOD PRESSURE: 128 MMHG | HEART RATE: 66 BPM | DIASTOLIC BLOOD PRESSURE: 67 MMHG

## 2018-08-09 RX ORDER — VENLAFAXINE HCL 75 MG
1 CAPSULE, EXT RELEASE 24 HR ORAL
Qty: 30 | Refills: 0 | OUTPATIENT
Start: 2018-08-09 | End: 2018-09-07

## 2018-08-09 RX ORDER — DIVALPROEX SODIUM 500 MG/1
2 TABLET, DELAYED RELEASE ORAL
Qty: 60 | Refills: 0 | OUTPATIENT
Start: 2018-08-09 | End: 2018-09-07

## 2018-08-09 RX ORDER — QUETIAPINE FUMARATE 200 MG/1
1 TABLET, FILM COATED ORAL
Qty: 30 | Refills: 0 | OUTPATIENT
Start: 2018-08-09 | End: 2018-09-07

## 2018-08-09 RX ADMIN — Medication 150 MILLIGRAM(S): at 08:38

## 2018-08-09 RX ADMIN — DIVALPROEX SODIUM 1000 MILLIGRAM(S): 500 TABLET, DELAYED RELEASE ORAL at 22:44

## 2018-08-09 RX ADMIN — QUETIAPINE FUMARATE 200 MILLIGRAM(S): 200 TABLET, FILM COATED ORAL at 22:44

## 2018-08-10 RX ADMIN — Medication 150 MILLIGRAM(S): at 08:26

## 2018-08-24 ENCOUNTER — INPATIENT (INPATIENT)
Facility: HOSPITAL | Age: 53
LOS: 6 days | Discharge: HOME | End: 2018-08-31
Attending: PSYCHIATRY & NEUROLOGY | Admitting: PSYCHIATRY & NEUROLOGY
Payer: MEDICAID

## 2018-08-24 VITALS
DIASTOLIC BLOOD PRESSURE: 99 MMHG | OXYGEN SATURATION: 96 % | WEIGHT: 210.1 LBS | HEIGHT: 69 IN | SYSTOLIC BLOOD PRESSURE: 163 MMHG | RESPIRATION RATE: 18 BRPM | TEMPERATURE: 98 F | HEART RATE: 74 BPM

## 2018-08-24 DIAGNOSIS — F31.9 BIPOLAR DISORDER, UNSPECIFIED: ICD-10-CM

## 2018-08-24 DIAGNOSIS — R45.851 SUICIDAL IDEATIONS: ICD-10-CM

## 2018-08-24 DIAGNOSIS — F19.10 OTHER PSYCHOACTIVE SUBSTANCE ABUSE, UNCOMPLICATED: ICD-10-CM

## 2018-08-24 DIAGNOSIS — F31.30 BIPOLAR DISORDER, CURRENT EPISODE DEPRESSED, MILD OR MODERATE SEVERITY, UNSPECIFIED: ICD-10-CM

## 2018-08-24 LAB
ANION GAP SERPL CALC-SCNC: 13 MMOL/L — SIGNIFICANT CHANGE UP (ref 7–14)
APAP SERPL-MCNC: <5 UG/ML — LOW (ref 10–30)
BASOPHILS # BLD AUTO: 0.06 K/UL — SIGNIFICANT CHANGE UP (ref 0–0.2)
BASOPHILS NFR BLD AUTO: 0.6 % — SIGNIFICANT CHANGE UP (ref 0–1)
BUN SERPL-MCNC: 9 MG/DL — LOW (ref 10–20)
CALCIUM SERPL-MCNC: 9.5 MG/DL — SIGNIFICANT CHANGE UP (ref 8.5–10.1)
CHLORIDE SERPL-SCNC: 100 MMOL/L — SIGNIFICANT CHANGE UP (ref 98–110)
CO2 SERPL-SCNC: 26 MMOL/L — SIGNIFICANT CHANGE UP (ref 17–32)
CREAT SERPL-MCNC: 0.9 MG/DL — SIGNIFICANT CHANGE UP (ref 0.7–1.5)
EOSINOPHIL # BLD AUTO: 0.52 K/UL — SIGNIFICANT CHANGE UP (ref 0–0.7)
EOSINOPHIL NFR BLD AUTO: 5.5 % — SIGNIFICANT CHANGE UP (ref 0–8)
ETHANOL SERPL-MCNC: <10 MG/DL — HIGH
GLUCOSE SERPL-MCNC: 93 MG/DL — SIGNIFICANT CHANGE UP (ref 70–99)
HCT VFR BLD CALC: 35.9 % — LOW (ref 42–52)
HGB BLD-MCNC: 11.9 G/DL — LOW (ref 14–18)
IMM GRANULOCYTES NFR BLD AUTO: 0.2 % — SIGNIFICANT CHANGE UP (ref 0.1–0.3)
LYMPHOCYTES # BLD AUTO: 3.42 K/UL — HIGH (ref 1.2–3.4)
LYMPHOCYTES # BLD AUTO: 36.4 % — SIGNIFICANT CHANGE UP (ref 20.5–51.1)
MCHC RBC-ENTMCNC: 28.9 PG — SIGNIFICANT CHANGE UP (ref 27–31)
MCHC RBC-ENTMCNC: 33.1 G/DL — SIGNIFICANT CHANGE UP (ref 32–37)
MCV RBC AUTO: 87.1 FL — SIGNIFICANT CHANGE UP (ref 80–94)
MONOCYTES # BLD AUTO: 0.82 K/UL — HIGH (ref 0.1–0.6)
MONOCYTES NFR BLD AUTO: 8.7 % — SIGNIFICANT CHANGE UP (ref 1.7–9.3)
NEUTROPHILS # BLD AUTO: 4.56 K/UL — SIGNIFICANT CHANGE UP (ref 1.4–6.5)
NEUTROPHILS NFR BLD AUTO: 48.6 % — SIGNIFICANT CHANGE UP (ref 42.2–75.2)
NRBC # BLD: 0 /100 WBCS — SIGNIFICANT CHANGE UP (ref 0–0)
PLATELET # BLD AUTO: 194 K/UL — SIGNIFICANT CHANGE UP (ref 130–400)
POTASSIUM SERPL-MCNC: 3.9 MMOL/L — SIGNIFICANT CHANGE UP (ref 3.5–5)
POTASSIUM SERPL-SCNC: 3.9 MMOL/L — SIGNIFICANT CHANGE UP (ref 3.5–5)
RBC # BLD: 4.12 M/UL — LOW (ref 4.7–6.1)
RBC # FLD: 14.4 % — SIGNIFICANT CHANGE UP (ref 11.5–14.5)
SALICYLATES SERPL-MCNC: <0.3 MG/DL — LOW (ref 4–30)
SODIUM SERPL-SCNC: 139 MMOL/L — SIGNIFICANT CHANGE UP (ref 135–146)
WBC # BLD: 9.4 K/UL — SIGNIFICANT CHANGE UP (ref 4.8–10.8)
WBC # FLD AUTO: 9.4 K/UL — SIGNIFICANT CHANGE UP (ref 4.8–10.8)

## 2018-08-24 PROCEDURE — G0427: CPT | Mod: GT

## 2018-08-24 RX ORDER — PSEUDOEPHEDRINE HCL 30 MG
30 TABLET ORAL
Qty: 0 | Refills: 0 | Status: DISCONTINUED | OUTPATIENT
Start: 2018-08-24 | End: 2018-08-31

## 2018-08-24 RX ORDER — DIPHENHYDRAMINE HCL 50 MG
50 CAPSULE ORAL EVERY 6 HOURS
Qty: 0 | Refills: 0 | Status: DISCONTINUED | OUTPATIENT
Start: 2018-08-24 | End: 2018-08-31

## 2018-08-24 RX ORDER — IBUPROFEN 200 MG
400 TABLET ORAL EVERY 8 HOURS
Qty: 0 | Refills: 0 | Status: DISCONTINUED | OUTPATIENT
Start: 2018-08-24 | End: 2018-08-31

## 2018-08-24 RX ORDER — HYDROXYZINE HCL 10 MG
50 TABLET ORAL EVERY 6 HOURS
Qty: 0 | Refills: 0 | Status: DISCONTINUED | OUTPATIENT
Start: 2018-08-24 | End: 2018-08-31

## 2018-08-24 RX ORDER — BENZOCAINE AND MENTHOL 5; 1 G/100ML; G/100ML
1 LIQUID ORAL THREE TIMES A DAY
Qty: 0 | Refills: 0 | Status: DISCONTINUED | OUTPATIENT
Start: 2018-08-24 | End: 2018-08-31

## 2018-08-24 RX ORDER — QUETIAPINE FUMARATE 200 MG/1
200 TABLET, FILM COATED ORAL AT BEDTIME
Qty: 0 | Refills: 0 | Status: DISCONTINUED | OUTPATIENT
Start: 2018-08-24 | End: 2018-08-31

## 2018-08-24 RX ORDER — HALOPERIDOL DECANOATE 100 MG/ML
5 INJECTION INTRAMUSCULAR EVERY 6 HOURS
Qty: 0 | Refills: 0 | Status: DISCONTINUED | OUTPATIENT
Start: 2018-08-24 | End: 2018-08-31

## 2018-08-24 RX ORDER — DIVALPROEX SODIUM 500 MG/1
500 TABLET, DELAYED RELEASE ORAL EVERY 12 HOURS
Qty: 0 | Refills: 0 | Status: DISCONTINUED | OUTPATIENT
Start: 2018-08-24 | End: 2018-08-31

## 2018-08-24 RX ADMIN — QUETIAPINE FUMARATE 200 MILLIGRAM(S): 200 TABLET, FILM COATED ORAL at 20:20

## 2018-08-24 RX ADMIN — Medication 200 MILLIGRAM(S): at 20:49

## 2018-08-24 RX ADMIN — DIVALPROEX SODIUM 500 MILLIGRAM(S): 500 TABLET, DELAYED RELEASE ORAL at 20:20

## 2018-08-24 NOTE — ED ADULT NURSE NOTE - NS_BH TRG QUESTION8_ED_ALL_ED
Ginette (includes Bipolar Disorder)/Anxiety (includes Panic, OCD)/Depression (without Suicidality or Psychosis)

## 2018-08-24 NOTE — ED BEHAVIORAL HEALTH ASSESSMENT NOTE - OTHER PAST PSYCHIATRIC HISTORY (INCLUDE DETAILS REGARDING ONSET, COURSE OF ILLNESS, INPATIENT/OUTPATIENT TREATMENT)
PPH Bipolar Disorder and Polysubstance abuse. He has a history of multiple past inpatient admissions last 6/6-6/12 2018 at [facility]. He has a history of multiple self reported suicide attempts last 1 year ago via OD. PPH Bipolar Disorder and Polysubstance abuse. He has a history of multiple past inpatient admissions   He has a history of multiple self reported suicide attempts last 1 year ago via OD.
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
## 2018-08-24 NOTE — PROGRESS NOTE BEHAVIORAL HEALTH - NSBHADDHXSUBSTFT_PSY_A_CORE
Reports drinking about a pint of liquor- last use 3 days ago. Uses about $200 worth of crack cocaine- last use 3 days ago. Smokes marijuana.

## 2018-08-24 NOTE — H&P ADULT - HISTORY OF PRESENT ILLNESS
51yo M with hx of BPD, suicide attempt last year, polysubstance abuse, presented to ED with c/o worsening depression and SI. Patient reports associated insomnia, anxiety, and anorexia. patient denies any auditory hallucinations. Patient reports using cocaine, marijuana and alcohol.
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
## 2018-08-24 NOTE — ED BEHAVIORAL HEALTH ASSESSMENT NOTE - RISK ASSESSMENT
Patient presents to the ER in the context of SI/depression. Patient endorses suicidal ideation to jump in front of a train and continued depression due to multiple psychosocial stressors. Acute risks include recent discharge from hospital, active substance abuse, poor social supports, homelessness, depressed mood, poor sleep and anxiety. He is unable to safety plan outside of the hospital environment. He is impulsive and unpredictable with poor judgement and insight. Patient presents an imminent risk to self and requires inpatient psychiatric admission for safety and stabilization.

## 2018-08-24 NOTE — ED BEHAVIORAL HEALTH ASSESSMENT NOTE - SUMMARY
Patient is a 52 year old single male; undomiciled staying with a friend; noncaregiver; unemployed; PPH of polysubstance abuse and Bipolar disorder; multiple prior hospitalizations (had 2 hospitalizations for depression and SI since June 2018 (most recent [facility] 1N 8/1-8/10/18) ; self reported multiple reported SA last year by overdose ;no known history of violence or aggression; hx of incarceration at [facility] for kaylin last year active alcohol abuse and crack cocaine abuse; no known history of complicated withdrawal; PMH of HTN; self presented to ED ; [place] with [place], pt relapsed onto drug use and has been noncompliant with medications, he is a danger to himself and requires admission for safety and stabilization.

## 2018-08-24 NOTE — ED PROVIDER NOTE - PHYSICAL EXAMINATION
Constitutional: Well developed, well nourished. NAD  Head: Normocephalic, atraumatic.  Eyes: PERRL, EOMI.  ENT: No nasal discharge. Mucous membranes dry.  Neck: Supple. Painless ROM.  Cardiovascular:  Regular rate and rhythm.   Pulmonary: Lungs clear to auscultation bilaterally.   Abdominal: Soft. Nondistended. No rebound, guarding, rigidity.  Extremities. Pelvis stable. No lower extremity edema, symmetric calves.  Skin: No rashes, cyanosis.  Neuro: AAOx3. No focal neurological deficits.  Psych: Normal mood. Normal affect.

## 2018-08-24 NOTE — ED BEHAVIORAL HEALTH ASSESSMENT NOTE - LEGAL HISTORY
• Legal History	As per chart, patient was in [facility] about 1 year ago for 40 days

## 2018-08-24 NOTE — ED ADULT TRIAGE NOTE - CHIEF COMPLAINT QUOTE
I'm feeling depressed, I want to kill myself. (Psych Hx of Bipolar, Manic/depression/anxiety, off medications x one week) - patient

## 2018-08-24 NOTE — ED BEHAVIORAL HEALTH ASSESSMENT NOTE - SUBSTANCE ISSUES AND PLAN (INCLUDE STANDING AND PRN MEDICATION)
no recent  alcohol use per pt's report; monitor for sx's of etoh w/d no recent  alcohol use per pt's report; nonetheless monitor for sx's of etoh w/d and treat if necessary

## 2018-08-24 NOTE — ED ADULT TRIAGE NOTE - NS_BH TRG QUESTION8_ED_ALL_ED
Anxiety (includes Panic, OCD)/Depression (without Suicidality or Psychosis)/Ginette (includes Bipolar Disorder)

## 2018-08-24 NOTE — ED BEHAVIORAL HEALTH ASSESSMENT NOTE - AXIS IV
Housing problems/Problems with access to healthcare services/Problem related to social environment/Problems with primary support/Occupational problems

## 2018-08-24 NOTE — H&P ADULT - NSHPREVIEWOFSYSTEMS_GEN_ALL_CORE
General:	no fever, weight loss,  chills  Skin: no rash, ulcers  Ophthalmologic: no visual changes  ENMT:	no sore throat  Respiratory and Thorax: no cough, wheeze,  sob  Cardiovascular:	no chest pain, palpitations, dizziness  Gastrointestinal:	no nausea, vomiting, diarrhea, abd pain  Genitourinary:	no dysuria, hematuria  Musculoskeletal:	no joint pains  Neurological:	 no speech disturbance, focal weakness, numbness  Psychiatric:	no  psychosis  Hematology/Lymphatics:	no anemia  Endocrine:	no polyuria, polydipsia

## 2018-08-24 NOTE — ED PROVIDER NOTE - OBJECTIVE STATEMENT
52 yold male to ED Pmhx Bipolar disorder, depression, Htn, substance abuse c/o depression and suicidal ideation (jumping of ferry); pt with hx of cocaine, marijuana and alcohol abuse - last used 4 days ago; pt  was on Depakote, Seroquel and clonidine: ran out of meds; pt currently undomiciled; does have hx of cutting himself in past;

## 2018-08-24 NOTE — PROGRESS NOTE BEHAVIORAL HEALTH - NSBHADDHXPSYCHFT_PSY_A_CORE
PH Bipolar Disorder and Polysubstance abuse. He has a history of multiple past inpatient admissions .  He has a history of multiple self reported suicide attempts last 1 year ago via OD as per ER note, however on interview pt states that he only tried to "cut" himself "many years ago."

## 2018-08-24 NOTE — ED BEHAVIORAL HEALTH ASSESSMENT NOTE - HPI (INCLUDE ILLNESS QUALITY, SEVERITY, DURATION, TIMING, CONTEXT, MODIFYING FACTORS, ASSOCIATED SIGNS AND SYMPTOMS)
Patient is a 52 year old single male; undomiciled staying with a friend; noncaregiver; unemployed; PPH of polysubstance abuse and Bipolar disorder; multiple prior hospitalizations (had 3 hospitalizations for depression and SI since June 2018 (most recent [facility] 8/1/18-8/10/18) ; self reported multiple reported SA last year by overdose; no known history of violence or aggression; hx of incarceration at [facility] for kaylin last year;   active alcohol abuse and crack cocaine abuse; no known history of complicated withdrawal; PMH of HTN; self presented to ED ; presenting with command auditory hallucinations/ suicidal ideations with thoughts to jump [place] the [place] ferry, in the setting of noncompliance with medications and substance abuse.     On interview, the patient is cooperative, anxious, depressed affect, linear thought process. States he is homeless, Reports suicidal ideation with plan to jump from the [place], and voices telling him he is worthless, and commanding him to jump from the ferry. he was d/c'ed from [facility] on 8/10, and since then he has been noncompliant with seroquel/depakote/effexor for the past week, and relapsed onto cocaine and cannabis, last using both two days ago. Patient is a 52 year old single male; undomiciled staying with a friend; noncaregiver; unemployed; PPH of polysubstance abuse and Bipolar disorder; multiple prior hospitalizations (had 3 hospitalizations for depression and SI since June 2018 (most recent [facility] 8/1/18-8/10/18) ; self reported multiple reported SA last year by overdose; no known history of violence or aggression; hx of incarceration at [facility] for kaylin last year;   active alcohol abuse and crack cocaine abuse; no known history of complicated withdrawal; PMH of HTN; self presented to ED ; presenting with command auditory hallucinations/ suicidal ideations with thoughts to jump from the [place] ferry, in the setting of noncompliance with medications and substance abuse.     On interview, the patient is cooperative, anxious, depressed affect, linear thought process. States he is homeless, Reports suicidal ideation with plan to jump from the [place], and voices telling him he is worthless, and commanding him to jump from the ferry. he was d/c'ed from [facility] on 8/10, and since then he has been noncompliant with seroquel/depakote/effexor for the past week, and relapsed onto cocaine and cannabis, last using both two days ago. unable to contract for safety and amenable to admission .

## 2018-08-24 NOTE — ED BEHAVIORAL HEALTH ASSESSMENT NOTE - DESCRIPTION (FIRST USE, LAST USE, QUANTITY, FREQUENCY, DURATION)
12 beers per day plus pint liquor (has not used for one month) intermittent use uses approx $200 crack per week, last two days ago stimulants-intermittent use

## 2018-08-24 NOTE — ED PROVIDER NOTE - PROGRESS NOTE DETAILS
case d/w psych - request labs; will see pt in Ed after cleared; case d/w psych - plan to admit to hospital; pt to be [facility] pending bed availability at [facility]; Signed out to Dr. Chaney received sign our from TRAY Saldivar 52 hx bipolar, depression w/ active SI. previous psych admission at [facility]. medically cleared. psych to determine if needs admission at [facility] or if bed is available there

## 2018-08-24 NOTE — ED ADULT TRIAGE NOTE - NS_BH TRG QUESTION7_ED_ALL_ED
Depression (without Suicidality or Psychosis)/Ginette (includes Bipolar Disorder)/Anxiety (includes Panic, OCD)

## 2018-08-24 NOTE — H&P ADULT - NSHPPHYSICALEXAM_GEN_ALL_CORE
Vital Signs Last 24 Hrs  T(C): 36.2 (24 Aug 2018 07:22), Max: 36.4 (24 Aug 2018 02:10)  T(F): 97.2 (24 Aug 2018 07:22), Max: 97.6 (24 Aug 2018 02:10)  HR: 67 (24 Aug 2018 07:22) (67 - 74)  BP: 135/81 (24 Aug 2018 07:22) (135/81 - 163/99)  BP(mean): --  RR: 20 (24 Aug 2018 07:22) (18 - 20)  SpO2: 97% (24 Aug 2018 07:22) (96% - 97%)    PHYSICAL EXAM:      Constitutional: A&Ox4  Eyes: PERRLA, EOM intact  ENMT: no sore throat  Neck: no tenderness  Back: no spinal tenderness  Respiratory: cta b/l  Cardiovascular: s1 s2 rrr  Gastrointestinal: soft nt  nd + bs no rebound or guarding  Genitourinary: no cva tenderness  Extremities: normal rom, no edema, calf tenderness  Vascular: no cyanosis   Neurological: no focal deficits  Skin: no rash  Lymph Nodes: no lymphadenopathy  Musculoskeletal: no joint swelling  Psychiatric: no psychosis, depressed mood

## 2018-08-24 NOTE — ED BEHAVIORAL HEALTH NOTE - BEHAVIORAL HEALTH NOTE
Patient is a 52 year old single male; undomiciled staying with a friend; noncaregiver; unemployed; PPH of polysubstance abuse and Bipolar disorder; multiple prior hospitalizations (had 3 hospitalizations for depression and SI since June 2018 (most recent [facility] 1N 8/1/18-8/10/18) ; self reported multiple reported SA last year by overdose; no known history of violence or aggression; hx of incarceration at [facility] for kaylin last year;   active alcohol abuse and crack cocaine abuse; no known history of complicated withdrawal; PMH of HTN; self presented to ED ; presenting with command auditory hallucinations/ suicidal ideations with thoughts to jump from the [facility]. Psychiatry consulted for depression and suicidal ideations. Upon approach, pt reports he is "not good, I'm feeling messed up". Upon further questioning, pt was unable to express what he meant by "messed up", however states he feels hopeless. Pt reports he slept and ate well overnight, and currently is not hearing any voices. He reports last hearing voices commanding him to jump off the  [employer] because he's worthless and better off dead" yesterday evening. Pt states he currently has suicidal ideations of jumping in front of [place], and that he has a plan and intent to do so as he states "I will do it at the best time possible". Last substance use was 3-4 days ago where he had "a lot" of alcohol, cocaine and marijuana, unable to identify amount. He denies seizure and other withdrawal symptoms, and currently is not experiencing any w/d symptoms at this time. Pt reports a desire to enter rehab upon discharge. He states he last took his medications last week but was taking them regularly before then, Seroquel and Depakote, but has since run out and thus has not taken them. He is aware of plan to be admitted IPP and is agreeable.    MSE  General: Pt in hospital gown, sitting on hospital bed, AOx3. Speech: Normal rate, rhythm, appropriate volume. Mood: "I'm not good". Affect: Depressed, congruent. Thought process: linear. Thought content: active suicidal ideations. Language: WNL. Perception: No AH since yesterday evening. Judgement: Good. Insight: Good.    A&P  Pt is a 51yo  male pt who presented to [facility] ED for suicidal ideations with plan and intent. Pt's presentation of suicidal ideation is in the context of active substance use and medication noncompliance, and appears to be an acute decompensation of his chronic underlying bipolar disorder, current depressed episode. Because pt is at increased risk due to his current suicidal ideations with plan and intent, homelessness, substance use, past suicidal attempts and IPP admissions, pt warrants IPP admission at this time. Pt currently appears to have good insight and judgement, and is aware of plan to be admitted IPP and is agreeable. Patient is a 52 year old single male; undomiciled staying with a friend; noncaregiver; unemployed; PPH of polysubstance abuse and Bipolar disorder; multiple prior hospitalizations (had 3 hospitalizations for depression and SI since June 2018 (most recent [facility] 1N 8/1/18-8/10/18) ; self reported multiple reported SA last year by overdose; no known history of violence or aggression; hx of incarceration at [facility] for kaylin last year;   active alcohol abuse and crack cocaine abuse; no known history of complicated withdrawal; PMH of HTN; self presented to ED ; presenting with command auditory hallucinations/ suicidal ideations with thoughts to jump from the [facility]. Psychiatry consulted for depression and suicidal ideations. Upon approach, pt reports he is "not good, I'm feeling messed up". Upon further questioning, pt was unable to express what he meant by "messed up", however states he feels hopeless. Pt reports he slept and ate well overnight, and currently is not hearing any voices. He reports last hearing voices commanding him to jump off the  [employer] because he's worthless and better off dead" yesterday evening. Pt states he currently has suicidal ideations of jumping in [place] of [place], and that he has a plan and intent to do so as he states "I will do it at the best time possible". Last substance use was 3-4 days ago where he had "a lot" of alcohol, cocaine and marijuana, unable to identify amount. He denies seizure and other withdrawal symptoms, and currently is not experiencing any w/d symptoms at this time. Pt reports a desire to enter rehab upon discharge. He states he last took his medications last week but was taking them regularly before then, Seroquel and Depakote, but has since run out and thus has not taken them. He is aware of plan to be admitted IPP and is agreeable.    MSE  General: Pt in hospital gown, sitting on hospital bed, AOx3. Speech: Normal rate, rhythm, appropriate volume. Mood: "I'm not good". Affect: Depressed, congruent. Thought process: linear. Thought content: active suicidal ideations. Language: WNL. Perception: No AH since yesterday evening. Judgement: Good. Insight: Good.    A&P  Pt is a 51yo  male pt who presented to [facility] ED for suicidal ideations with plan and intent. Pt's presentation of suicidal ideation is in the context of active substance use and medication noncompliance, and appears to be an acute decompensation of his chronic underlying bipolar disorder, current depressed episode. Because pt is at increased risk due to his current suicidal ideations with plan and intent, homelessness, substance use, past suicidal attempts and IPP admissions, pt warrants IPP admission at this time. Pt currently appears to have good insight and judgement, and is aware of plan to be admitted IPP and is agreeable.    Attending  Patient seen and examined with PGY1 resident and medical student.  I agree with the assessment and plan documented above and by overnight psychiatric attending.  Patient will be admitted on 9.13 status.

## 2018-08-24 NOTE — PROGRESS NOTE BEHAVIORAL HEALTH - NSBHFUPADDHPIFT_PSY_A_CORE
Patient is a 52 year old single male; undomiciled-homeless; noncaregiver; unemployed; PPH of polysubstance abuse and Bipolar disorder; multiple prior hospitalizations (had 3 hospitalizations for depression and SI since June 2018 (most recent [facility] 1N 8/1/18-8/10/18) ; self reported multiple reported SA last year by overdose; no known history of violence or aggression; hx of incarceration at [facility] for kaylin last year;  active alcohol abuse and crack cocaine abuse and marijuana; no known history of complicated withdrawal; PMH of HTN; self presented to ED ; presenting with command auditory hallucinations/ suicidal ideations with thoughts to jump from the [facility], in the setting of noncompliance with medications and substance abuse.     On interview, the patient is cooperative, depressed affect, linear thought process. Pt has been inconsistent in his history when compared to the history provided in the ER. During evaluation, pt reports that he was recently discharged from the hospital. States he has not been compliant with medication although reports that found them helpful. States he is homeless. States that he used cocaine/alcohol and cannabis 3 days ago. Denied withdrawal symptoms at this time. States that he has been feeling depressed "for a couple of days" without an identifiable trigger/stressor. States his sleep and appetite are fine. Reports that he was hearing voices telling him he is "worthless." Denied hearing voices at this time. Denied suicidal/homicidal ideation, intent or plan at this time.

## 2018-08-24 NOTE — ED PROVIDER NOTE - ATTENDING CONTRIBUTION TO CARE
I personally evaluated the patient. I reviewed the Resident’s or Physician Assistant’s note (as assigned above), and agree with the findings and plan except as documented in my note.  Pt with hx of drug abuse, undomiciled, bipolar, depression presents for SI. Pt ran out of and has had worsening of depression. Thought of jumping off [place] [employer] and presented here. No HI. VS reviewed, NAD. Head ncat, normal s1s2 without any murmurs, Lungs CTAB with normal work of breathing. abd +BS, s/n/ntd, extremities wnl, neuro exam grossly normal. No acute skin rashes. Mood is depressed. Plan is medical clearance and psych eval.

## 2018-08-24 NOTE — H&P ADULT - NSHPLABSRESULTS_GEN_ALL_CORE
11.9   9.40  )-----------( 194      ( 24 Aug 2018 03:32 )             35.9       08-24    139  |  100  |  9<L>  ----------------------------<  93  3.9   |  26  |  0.9    Ca    9.5      24 Aug 2018 03:32

## 2018-08-24 NOTE — ED PROVIDER NOTE - NS ED ROS FT
Constitutional: No fever, chills.  Eyes: No visual changes.  ENT: No hearing changes.  Neck: No neck pain or stiffness.  Cardiovascular: No chest pain, palpitations, edema.  Pulmonary: No SOB, cough. No hemoptysis.  Abdominal:  No nausea, vomiting, diarrhea.  : No dysuria, frequency.  Neuro: No headache, syncope, dizziness.  MS: No back pain. No calf pain/swelling.  Psych: see hpi

## 2018-08-24 NOTE — ED ADULT NURSE NOTE - NSIMPLEMENTINTERV_GEN_ALL_ED
Implemented All Universal Safety Interventions:  [place] to call system. Call bell, personal items and telephone within reach. Instruct patient to call for assistance. Room bathroom lighting operational. Non-slip footwear when patient is off stretcher. Physically safe environment: no spills, clutter or unnecessary equipment. Stretcher in lowest position, wheels locked, appropriate side rails in place.
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
## 2018-08-25 RX ADMIN — DIVALPROEX SODIUM 500 MILLIGRAM(S): 500 TABLET, DELAYED RELEASE ORAL at 21:27

## 2018-08-25 RX ADMIN — Medication 200 MILLIGRAM(S): at 17:53

## 2018-08-25 RX ADMIN — QUETIAPINE FUMARATE 200 MILLIGRAM(S): 200 TABLET, FILM COATED ORAL at 21:27

## 2018-08-25 NOTE — PROGRESS NOTE BEHAVIORAL HEALTH - NSBHCHARTREVIEWINVESTIGATE_PSY_A_CORE FT
< from: 12 Lead ECG (08.24.18 @ 05:00) >    QTC Calculation(Bezet) 430 ms    < end of copied text >
< from: 12 Lead ECG (08.24.18 @ 05:00) >    QTC Calculation(Bezet) 430 ms    < end of copied text >

## 2018-08-25 NOTE — PROGRESS NOTE BEHAVIORAL HEALTH - NSBHCHARTREVIEWLAB_PSY_A_CORE FT
Complete Blood Count + Automated Diff (08.24.18 @ 03:32)    WBC Count: 9.40 K/uL    RBC Count: 4.12 M/uL    Hemoglobin: 11.9 g/dL    Hematocrit: 35.9 %    Mean Cell Volume: 87.1 fL    Mean Cell Hemoglobin: 28.9 pg    Mean Cell Hemoglobin Conc: 33.1 g/dL    Red Cell Distrib Width: 14.4 %    Platelet Count - Automated: 194 K/uL    Auto Neutrophil #: 4.56 K/uL    Auto Lymphocyte #: 3.42 K/uL    Auto Monocyte #: 0.82 K/uL    Auto Eosinophil #: 0.52 K/uL    Auto Basophil #: 0.06 K/uL    Auto Neutrophil %: 48.6: Differential percentages must be correlated with absolute numbers for  clinical significance. %    Auto Lymphocyte %: 36.4 %    Auto Monocyte %: 8.7 %    Auto Eosinophil %: 5.5 %    Auto Basophil %: 0.6 %    Auto Immature Granulocyte %: 0.2 %
Complete Blood Count + Automated Diff (08.24.18 @ 03:32)    WBC Count: 9.40 K/uL    RBC Count: 4.12 M/uL    Hemoglobin: 11.9 g/dL    Hematocrit: 35.9 %    Mean Cell Volume: 87.1 fL    Mean Cell Hemoglobin: 28.9 pg    Mean Cell Hemoglobin Conc: 33.1 g/dL    Red Cell Distrib Width: 14.4 %    Platelet Count - Automated: 194 K/uL    Auto Neutrophil #: 4.56 K/uL    Auto Lymphocyte #: 3.42 K/uL    Auto Monocyte #: 0.82 K/uL    Auto Eosinophil #: 0.52 K/uL    Auto Basophil #: 0.06 K/uL    Auto Neutrophil %: 48.6: Differential percentages must be correlated with absolute numbers for  clinical significance. %    Auto Lymphocyte %: 36.4 %    Auto Monocyte %: 8.7 %    Auto Eosinophil %: 5.5 %    Auto Basophil %: 0.6 %    Auto Immature Granulocyte %: 0.2 %

## 2018-08-25 NOTE — PROGRESS NOTE BEHAVIORAL HEALTH - SUMMARY
Patient is a 52 year old single male; undomiciled staying with a friend; noncaregiver; unemployed; PPH of polysubstance abuse and Bipolar disorder; multiple prior hospitalizations (had 2 hospitalizations for depression and SI since June 2018 (most recent [facility] 1N 8/1-8/10/18) ; self reported multiple reported SA last year by overdose ;no known history of violence or aggression; hx of incarceration at [facility] for kaylin last year active alcohol abuse and crack cocaine abuse; no known history of complicated withdrawal; PMH of HTN; self presented to ED ; presenting with [place] ideations [place], pt relapsed onto drug use and has been noncompliant with medications, he is a danger to himself and requires admission for safety and stabilization.
Patient is a 52 year old single male; undomiciled staying with a friend; noncaregiver; unemployed; PPH of polysubstance abuse and Bipolar disorder; multiple prior hospitalizations (had 2 hospitalizations for depression and SI since June 2018 (most recent [facility] 1N 8/1-8/10/18) ; self reported multiple reported SA last year by overdose ;no known history of violence or aggression; hx of incarceration at [facility] for kaylin last year active alcohol abuse and crack cocaine abuse; no known history of complicated withdrawal; PMH of HTN; self presented to ED ; presenting with suicidal ideations with [place] to [place], pt relapsed onto drug use and has been noncompliant with medications, he is a danger to himself and requires admission for safety and stabilization.

## 2018-08-25 NOTE — PROGRESS NOTE BEHAVIORAL HEALTH - NSBHCHARTREVIEWVS_PSY_A_CORE FT
Vital Signs Last 24 Hrs  T(C): 36.7 (25 Aug 2018 17:50), Max: 36.7 (25 Aug 2018 17:50)  T(F): 98 (25 Aug 2018 17:50), Max: 98 (25 Aug 2018 17:50)  HR: 64 (25 Aug 2018 17:50) (61 - 64)  BP: 161/96 (25 Aug 2018 17:50) (118/59 - 161/96)  BP(mean): --  RR: 16 (25 Aug 2018 17:50) (16 - 16)  SpO2: --
ICU Vital Signs Last 24 Hrs  T(C): 36.2 (24 Aug 2018 07:22), Max: 36.4 (24 Aug 2018 02:10)  T(F): 97.2 (24 Aug 2018 07:22), Max: 97.6 (24 Aug 2018 02:10)  HR: 67 (24 Aug 2018 07:22) (67 - 74)  BP: 135/81 (24 Aug 2018 07:22) (135/81 - 163/99)  BP(mean): --  ABP: --  ABP(mean): --  RR: 20 (24 Aug 2018 07:22) (18 - 20)  SpO2: 97% (24 Aug 2018 07:22) (96% - 97%)

## 2018-08-25 NOTE — PROGRESS NOTE BEHAVIORAL HEALTH - RISK ASSESSMENT
Patient presents to the ER in the context of SI/depression. Patient endorses suicidal ideation to jump in front of a train and continued depression due to multiple psychosocial stressors. Acute risks include recent discharge from hospital, active substance abuse, poor social supports, homelessness, depressed mood, poor sleep and anxiety. He is unable to safety plan outside of the hospital environment. He is impulsive and unpredictable with poor judgement and insight. Patient presents an imminent risk to self and requires inpatient psychiatric admission for safety and stabilization.
Patient presents to the ER in the context of SI/depression. Patient endorses suicidal ideation to jump in front of a train and continued depression due to multiple psychosocial stressors. Acute risks include recent discharge from hospital, active substance abuse, poor social supports, homelessness, depressed mood, poor sleep and anxiety. He is unable to safety plan outside of the hospital environment. He is impulsive and unpredictable with poor judgement and insight. Patient presents an imminent risk to self and requires inpatient psychiatric admission for safety and stabilization.

## 2018-08-26 RX ORDER — AMLODIPINE BESYLATE 2.5 MG/1
5 TABLET ORAL DAILY
Qty: 0 | Refills: 0 | Status: DISCONTINUED | OUTPATIENT
Start: 2018-08-26 | End: 2018-08-31

## 2018-08-26 RX ADMIN — DIVALPROEX SODIUM 500 MILLIGRAM(S): 500 TABLET, DELAYED RELEASE ORAL at 21:22

## 2018-08-26 RX ADMIN — QUETIAPINE FUMARATE 200 MILLIGRAM(S): 200 TABLET, FILM COATED ORAL at 21:22

## 2018-08-26 RX ADMIN — AMLODIPINE BESYLATE 5 MILLIGRAM(S): 2.5 TABLET ORAL at 18:34

## 2018-08-26 NOTE — PROGRESS NOTE BEHAVIORAL HEALTH - PROBLEM SELECTOR PLAN 1
-Restart Depakote 500 mg BID  -Restart Seroquel 200 mg qhs

## 2018-08-26 NOTE — PROGRESS NOTE BEHAVIORAL HEALTH - PROBLEM SELECTOR PLAN 2
-Monitor for objective signs of withdrawal

## 2018-08-26 NOTE — CHART NOTE - NSCHARTNOTEFT_GEN_A_CORE
Called by RN for elevated b/p.  RN relates that b/p has been elevated several times during hospital course.  Patient without prior Hx of HTN.  Asymptomatic.  Labs reviewed.  B/P reviewed and patient with several abnormal and elevated readings.  Will initiate tx with Amlodipine 5mg PO QD and monitor v/s

## 2018-08-27 RX ADMIN — Medication 200 MILLIGRAM(S): at 19:04

## 2018-08-27 RX ADMIN — AMLODIPINE BESYLATE 5 MILLIGRAM(S): 2.5 TABLET ORAL at 08:34

## 2018-08-27 RX ADMIN — DIVALPROEX SODIUM 500 MILLIGRAM(S): 500 TABLET, DELAYED RELEASE ORAL at 08:34

## 2018-08-27 RX ADMIN — DIVALPROEX SODIUM 500 MILLIGRAM(S): 500 TABLET, DELAYED RELEASE ORAL at 20:53

## 2018-08-27 RX ADMIN — QUETIAPINE FUMARATE 200 MILLIGRAM(S): 200 TABLET, FILM COATED ORAL at 20:53

## 2018-08-27 NOTE — CHART NOTE - NSCHARTNOTEFT_GEN_A_CORE
Social Work Note:    Patient is isolative to his room.  He is reporting less depression at this time.  Rob is undomiciled.    Discharge plan to be determined in collaboration with patient and treatment team.     At this time patient is not psychiatrically stable for discharge.

## 2018-08-27 NOTE — CONSULT NOTE ADULT - SUBJECTIVE AND OBJECTIVE BOX
CHINMAY SPRING  52y  Male      Patient is a 52y old  Male who presents with a chief complaint of became increasing depressed with suicidal ideations.ran out of his psychiatric meds (24 Aug 2018 15:04)        PAST MEDICAL/SURGICAL HISTORY  PAST MEDICAL & SURGICAL HISTORY:  Tinea pedis of left foot  Essential hypertension  Depression  No significant past surgical history      REVIEW OF SYSTEMS:  CONSTITUTIONAL: No fever, weight loss, or fatigue  EYES: No eye pain, visual disturbances, or discharge  ENMT:  No difficulty hearing, tinnitus, vertigo; No sinus or throat pain  NECK: No pain or stiffness  RESPIRATORY: No cough, wheezing, chills or hemoptysis; No shortness of breath  CARDIOVASCULAR: No chest pain, palpitations, dizziness, or leg swelling  GASTROINTESTINAL: No abdominal or epigastric pain. No nausea, vomiting, or hematemesis; No diarrhea or constipation. No melena or hematochezia.  GENITOURINARY: No dysuria, frequency, hematuria, or incontinence    ALLERY AND IMMUNOLOGIC: No hives or eczema    amLODIPine   Tablet 5 milliGRAM(s) Oral daily  benzocaine 15 mG/menthol 3.6 mG Lozenge 1 Lozenge Oral three times a day PRN  diphenhydrAMINE   Capsule 50 milliGRAM(s) Oral every 6 hours PRN  diVALproex  milliGRAM(s) Oral every 12 hours  guaiFENesin    Syrup 200 milliGRAM(s) Oral every 6 hours PRN  haloperidol     Tablet 5 milliGRAM(s) Oral every 6 hours PRN  hydrOXYzine hydrochloride 50 milliGRAM(s) Oral every 6 hours PRN  ibuprofen  Tablet 400 milliGRAM(s) Oral every 8 hours PRN  LORazepam     Tablet 2 milliGRAM(s) Oral every 8 hours PRN  pseudoephedrine 30 milliGRAM(s) Oral two times a day PRN  QUEtiapine 200 milliGRAM(s) Oral at bedtime      T(C): 36.7 (08-27-18 @ 06:27), Max: 37 (08-26-18 @ 10:29)  HR: 65 (08-27-18 @ 06:27) (62 - 82)  BP: 133/79 (08-27-18 @ 06:27) (133/77 - 165/109)  RR: 17 (08-27-18 @ 06:27) (17 - 20)  SpO2: --  Wt(kg): --Vital Signs Last 24 Hrs  T(C): 36.7 (27 Aug 2018 06:27), Max: 37 (26 Aug 2018 10:29)  T(F): 98.1 (27 Aug 2018 06:27), Max: 98.6 (26 Aug 2018 10:29)  HR: 65 (27 Aug 2018 06:27) (62 - 82)  BP: 133/79 (27 Aug 2018 06:27) (133/77 - 165/109)  BP(mean): --  RR: 17 (27 Aug 2018 06:27) (17 - 20)  SpO2: --    PHYSICAL EXAM:  GENERAL: NAD, well-groomed, well-developed  HEAD:  Atraumatic, Normocephalic  EYES: EOMI, PERRLA, conjunctiva and sclera clear  ENMT: No tonsillar erythema, exudates, or enlargement; Moist mucous membranes, Good dentition, No lesions  NECK: Supple, No JVD, Normal thyroid  NERVOUS SYSTEM:  Alert & Oriented X3, Good concentration; Motor Strength 5/5 B/L upper and lower extremities; DTRs 2+ intact and symmetric  CHEST/LUNG: Clear to percussion bilaterally; No rales, rhonchi, wheezing, or rubs  HEART: Regular rate and rhythm; No murmurs, rubs, or gallops  ABDOMEN: Soft, Nontender, Nondistended; Bowel sounds present  EXTREMITIES:  2+ Peripheral Pulses, No clubbing, cyanosis, or edema  LYMPH: No lymphadenopathy noted  SKIN: No rashes or lesions      LABS:  CBC       BMP      CMP      PT/INR      Amylase/Lipase            RADIOLOGY & ADDITIONAL TESTS:    Imaging Personally Reviewed:  [ ] YES  [ ] NO

## 2018-08-28 DIAGNOSIS — F31.32 BIPOLAR DISORDER, CURRENT EPISODE DEPRESSED, MODERATE: ICD-10-CM

## 2018-08-28 RX ADMIN — DIVALPROEX SODIUM 500 MILLIGRAM(S): 500 TABLET, DELAYED RELEASE ORAL at 21:47

## 2018-08-28 RX ADMIN — QUETIAPINE FUMARATE 200 MILLIGRAM(S): 200 TABLET, FILM COATED ORAL at 21:47

## 2018-08-28 RX ADMIN — AMLODIPINE BESYLATE 5 MILLIGRAM(S): 2.5 TABLET ORAL at 08:57

## 2018-08-28 NOTE — PROGRESS NOTE BEHAVIORAL HEALTH - NSBHFUPINTERVALHXFT_PSY_A_CORE
pt seen  , discussed with staff. pt is compliant with medications.  no acute event overnight.
As per HPI
_
Pt currently reported that he feels better, less depressed and denies any suicidal of homicidal ideation at present compliant wit treatment and responsive to staff's verbal redirection.

## 2018-08-29 RX ADMIN — DIVALPROEX SODIUM 500 MILLIGRAM(S): 500 TABLET, DELAYED RELEASE ORAL at 20:37

## 2018-08-29 RX ADMIN — AMLODIPINE BESYLATE 5 MILLIGRAM(S): 2.5 TABLET ORAL at 08:17

## 2018-08-29 RX ADMIN — QUETIAPINE FUMARATE 200 MILLIGRAM(S): 200 TABLET, FILM COATED ORAL at 20:37

## 2018-08-29 NOTE — CHART NOTE - NSCHARTNOTEFT_GEN_A_CORE
Social Work Note:    Patient was seen on unit rounds.  He engaged appropriately.  In the morning he is isolative in his room.     Plan discussed with patient.  He will return to [facility] and resume outpatient mental health treatment at their clinic.     At this time patient is not psychiatrically stable for discharge.

## 2018-08-30 RX ADMIN — AMLODIPINE BESYLATE 5 MILLIGRAM(S): 2.5 TABLET ORAL at 08:58

## 2018-08-30 RX ADMIN — DIVALPROEX SODIUM 500 MILLIGRAM(S): 500 TABLET, DELAYED RELEASE ORAL at 21:32

## 2018-08-30 RX ADMIN — DIVALPROEX SODIUM 500 MILLIGRAM(S): 500 TABLET, DELAYED RELEASE ORAL at 08:58

## 2018-08-30 RX ADMIN — QUETIAPINE FUMARATE 200 MILLIGRAM(S): 200 TABLET, FILM COATED ORAL at 21:32

## 2018-08-30 NOTE — PROGRESS NOTE BEHAVIORAL HEALTH - NSBHPTASSESSDT_PSY_A_CORE
25-Aug-2018 23:35
29-Aug-2018 11:54
24-Aug-2018 13:35
30-Aug-2018 11:25
28-Aug-2018 10:38
27-Aug-2018 15:16
26-Aug-2018 14:55

## 2018-08-30 NOTE — PROGRESS NOTE BEHAVIORAL HEALTH - NSBHADMITDANGERSELF_PSY_A_CORE
suicidal ideation with plan and means
unable to care for self
unable to care for self
suicidal ideation with plan and means
unable to care for self
unable to care for self
suicidal ideation with plan and means

## 2018-08-30 NOTE — PROGRESS NOTE BEHAVIORAL HEALTH - NSBHADMITIPOBS_PSY_A_CORE
Enhanced supervision
Routine observation
Routine observation
Enhanced supervision
Routine observation
Routine observation
Enhanced supervision

## 2018-08-30 NOTE — PROGRESS NOTE BEHAVIORAL HEALTH - PERCEPTIONS
Auditory hallucinations/No abnormalities
No abnormalities
Auditory hallucinations/No abnormalities
No abnormalities
Auditory hallucinations/No abnormalities

## 2018-08-30 NOTE — PROGRESS NOTE BEHAVIORAL HEALTH - PRIMARY DX
Bipolar disorder current episode depressed
Polysubstance abuse
Bipolar 1 disorder, depressed
Bipolar 1 disorder, depressed
Bipolar affective disorder, currently depressed, moderate

## 2018-08-30 NOTE — PROGRESS NOTE BEHAVIORAL HEALTH - INSIGHT (REGARDING PSYCHIATRIC ILLNESS)
Start taking gabapentin 600 mg twice per day for 7 days then 300 twice per day for 7 days, then stop the medication.  
Fair

## 2018-08-30 NOTE — PROGRESS NOTE BEHAVIORAL HEALTH - THOUGHT CONTENT
Unremarkable/Hopelessness
Hopelessness/Unremarkable
Unremarkable/Hopelessness
Unremarkable/Hopelessness

## 2018-08-30 NOTE — PROGRESS NOTE BEHAVIORAL HEALTH - NSBHFUPINTERVALCCFT_PSY_A_CORE
"feeling ok"
mood is neutral. No s/h ideation or overt psychosis. Behavior is goal directed and appropriate.  No interest in rehab placement; states he wants to return to shelter when stable.
pt is calm and cooperative. Denies s/h ideation.  No psychosis evident.  No interest in rehab; states he feels better on meds and wants to return to shelter with f/u
As per HPI
pt's mood is improved; no s/h ideation. Meds have been restarted without any adverse events.  Pt is ambivalent at [facility] about rehab placement.
chart reviewed and patient interviewed. No s/h ideation. Pt essentially states that he felt "depressed" about his housing issues, so he came to the hospital.  No plans to harm self; denies suicide attempts.  No withdrawal sx; states he does well on meds and will consider rehab option
Patient seen in his room lying in bed currently cooperative states that "He feels better."

## 2018-08-30 NOTE — PROGRESS NOTE BEHAVIORAL HEALTH - SECONDARY DX1
Polysubstance abuse
Bipolar 1 disorder, depressed
Polysubstance abuse
Polysubstance abuse

## 2018-08-31 VITALS
HEART RATE: 93 BPM | DIASTOLIC BLOOD PRESSURE: 77 MMHG | RESPIRATION RATE: 16 BRPM | TEMPERATURE: 98 F | SYSTOLIC BLOOD PRESSURE: 143 MMHG

## 2018-08-31 RX ORDER — DIVALPROEX SODIUM 500 MG/1
1 TABLET, DELAYED RELEASE ORAL
Qty: 0 | Refills: 0 | COMMUNITY
Start: 2018-08-31

## 2018-08-31 RX ORDER — AMLODIPINE BESYLATE 2.5 MG/1
1 TABLET ORAL
Qty: 14 | Refills: 0 | OUTPATIENT
Start: 2018-08-31 | End: 2018-09-13

## 2018-08-31 RX ORDER — AMLODIPINE BESYLATE 2.5 MG/1
1 TABLET ORAL
Qty: 0 | Refills: 0 | COMMUNITY
Start: 2018-08-31

## 2018-08-31 RX ORDER — DIVALPROEX SODIUM 500 MG/1
1 TABLET, DELAYED RELEASE ORAL
Qty: 28 | Refills: 0 | OUTPATIENT
Start: 2018-08-31 | End: 2018-09-13

## 2018-08-31 RX ORDER — QUETIAPINE FUMARATE 200 MG/1
1 TABLET, FILM COATED ORAL
Qty: 14 | Refills: 0 | OUTPATIENT
Start: 2018-08-31 | End: 2018-09-29

## 2018-08-31 RX ADMIN — AMLODIPINE BESYLATE 5 MILLIGRAM(S): 2.5 TABLET ORAL at 07:53

## 2018-08-31 NOTE — DISCHARGE NOTE BEHAVIORAL HEALTH - MEDICATION SUMMARY - MEDICATIONS TO CHANGE
I will SWITCH the dose or number of times a day I take the medications listed below when I get home from the hospital:    divalproex sodium 500 mg oral delayed release tablet  -- 2 tab(s) by mouth once a day (at bedtime)    SEROquel 200 mg oral tablet  -- 1 tab(s) by mouth once a day (at bedtime)

## 2018-08-31 NOTE — CHART NOTE - NSCHARTNOTEFT_GEN_A_CORE
Social Work Discharge Note:    Patient is for discharge today.   He is alert and oriented x3.  Mood is improved.  Anxiety has decreased.  Insight and judgment have improved.  Suicidal / homicidal ideation denied.      Patient will be discharged to [facility].  His [facility] ID = [id number] as verified by [employer] representative Kala [phone].  Per patient report he has been to [place] outpatient mental health clinic and will return to [facility].      This case was reviewed with social work clinical manager.         Patient is aware and agreeable to discharge today.

## 2018-08-31 NOTE — DISCHARGE NOTE BEHAVIORAL HEALTH - HPI (INCLUDE ILLNESS QUALITY, SEVERITY, DURATION, TIMING, CONTEXT, MODIFYING FACTORS, ASSOCIATED SIGNS AND SYMPTOMS)
53 y/o male known from prior admits, with hx ?bipolar disorder/polysubstance abuse was admitted with vague thoughts to harm self. No psychosis or actual plan to harm self.    pt was restarted on meds; no need for detox protocol. Pt had no interest in rehab and requested d/c back to shelter.

## 2018-08-31 NOTE — DISCHARGE NOTE BEHAVIORAL HEALTH - MEDICATION SUMMARY - MEDICATIONS TO STOP TAKING
I will STOP taking the medications listed below when I get home from the hospital:    Effexor  mg oral capsule, extended release  -- 1 cap(s) by mouth once a day, but can be taken instead at bedtime with other medications safely

## 2018-08-31 NOTE — DISCHARGE NOTE BEHAVIORAL HEALTH - MEDICATION SUMMARY - MEDICATIONS TO TAKE
I will START or STAY ON the medications listed below when I get home from the hospital:    divalproex sodium 500 mg oral delayed release tablet  -- 1 tab(s) by mouth every 12 hours  -- Indication: For Bipolar 1 disorder, depressed    SEROquel 200 mg oral tablet  -- 1 tab(s) by mouth once a day (at bedtime)  -- Indication: For Bipolar 1 disorder, depressed    amLODIPine 5 mg oral tablet  -- 1 tab(s) by mouth once a day  -- Indication: For Bipolar 1 disorder, depressed

## 2018-08-31 NOTE — DISCHARGE NOTE BEHAVIORAL HEALTH - CONDITIONS AT DISCHARGE
Rob is ready to be discharged. Denies suicidal or homicidal ideations or plan right now; denies hallucinations or delusions now and presents in a stable mood. States he will follow-up with care. Has no questions and voices no concerns.

## 2018-09-05 DIAGNOSIS — R45.851 SUICIDAL IDEATIONS: ICD-10-CM

## 2018-09-05 DIAGNOSIS — Z72.0 TOBACCO USE: ICD-10-CM

## 2018-09-05 DIAGNOSIS — I10 ESSENTIAL (PRIMARY) HYPERTENSION: ICD-10-CM

## 2018-09-05 DIAGNOSIS — F14.10 COCAINE ABUSE, UNCOMPLICATED: ICD-10-CM

## 2018-09-05 DIAGNOSIS — F31.9 BIPOLAR DISORDER, UNSPECIFIED: ICD-10-CM

## 2018-09-05 DIAGNOSIS — Z91.013 ALLERGY TO SEAFOOD: ICD-10-CM

## 2018-09-05 DIAGNOSIS — Z91.5 PERSONAL HISTORY OF SELF-HARM: ICD-10-CM

## 2018-09-05 DIAGNOSIS — F10.10 ALCOHOL ABUSE, UNCOMPLICATED: ICD-10-CM

## 2018-09-05 DIAGNOSIS — Z59.0 HOMELESSNESS: ICD-10-CM

## 2018-09-05 DIAGNOSIS — F12.90 CANNABIS USE, UNSPECIFIED, UNCOMPLICATED: ICD-10-CM

## 2018-09-05 DIAGNOSIS — Z91.14 PATIENT'S OTHER NONCOMPLIANCE WITH MEDICATION REGIMEN: ICD-10-CM

## 2018-09-05 SDOH — ECONOMIC STABILITY - HOUSING INSECURITY: HOMELESSNESS: Z59.0

## 2018-09-10 ENCOUNTER — INPATIENT (INPATIENT)
Facility: HOSPITAL | Age: 53
LOS: 8 days | Discharge: HOME | End: 2018-09-19
Attending: PSYCHIATRY & NEUROLOGY | Admitting: PSYCHIATRY & NEUROLOGY
Payer: MEDICAID

## 2018-09-10 VITALS
DIASTOLIC BLOOD PRESSURE: 92 MMHG | RESPIRATION RATE: 16 BRPM | TEMPERATURE: 98 F | SYSTOLIC BLOOD PRESSURE: 130 MMHG | HEART RATE: 61 BPM | OXYGEN SATURATION: 100 %

## 2018-09-10 DIAGNOSIS — F31.4 BIPOLAR DISORDER, CURRENT EPISODE DEPRESSED, SEVERE, WITHOUT PSYCHOTIC FEATURES: ICD-10-CM

## 2018-09-10 DIAGNOSIS — F32.9 MAJOR DEPRESSIVE DISORDER, SINGLE EPISODE, UNSPECIFIED: ICD-10-CM

## 2018-09-10 DIAGNOSIS — R69 ILLNESS, UNSPECIFIED: ICD-10-CM

## 2018-09-10 DIAGNOSIS — F14.10 COCAINE ABUSE, UNCOMPLICATED: ICD-10-CM

## 2018-09-10 DIAGNOSIS — F10.20 ALCOHOL DEPENDENCE, UNCOMPLICATED: ICD-10-CM

## 2018-09-10 DIAGNOSIS — F12.10 CANNABIS ABUSE, UNCOMPLICATED: ICD-10-CM

## 2018-09-10 LAB
AMPHET UR-MCNC: NEGATIVE — SIGNIFICANT CHANGE UP
APAP SERPL-MCNC: < 15 UG/ML — LOW (ref 15–25)
BARBITURATES UR SCN-MCNC: NEGATIVE — SIGNIFICANT CHANGE UP
BASOPHILS # BLD AUTO: 0.05 K/UL — SIGNIFICANT CHANGE UP (ref 0–0.2)
BASOPHILS NFR BLD AUTO: 0.5 % — SIGNIFICANT CHANGE UP (ref 0–2)
BENZODIAZ UR-MCNC: POSITIVE — SIGNIFICANT CHANGE UP
BUN SERPL-MCNC: 10 MG/DL — SIGNIFICANT CHANGE UP (ref 7–23)
CALCIUM SERPL-MCNC: 9.4 MG/DL — SIGNIFICANT CHANGE UP (ref 8.4–10.5)
CANNABINOIDS UR-MCNC: POSITIVE — SIGNIFICANT CHANGE UP
CHLORIDE SERPL-SCNC: 100 MMOL/L — SIGNIFICANT CHANGE UP (ref 98–107)
CO2 SERPL-SCNC: 24 MMOL/L — SIGNIFICANT CHANGE UP (ref 22–31)
COCAINE METAB.OTHER UR-MCNC: POSITIVE — SIGNIFICANT CHANGE UP
CREAT SERPL-MCNC: 0.79 MG/DL — SIGNIFICANT CHANGE UP (ref 0.5–1.3)
EOSINOPHIL # BLD AUTO: 0.5 K/UL — SIGNIFICANT CHANGE UP (ref 0–0.5)
EOSINOPHIL NFR BLD AUTO: 4.8 % — SIGNIFICANT CHANGE UP (ref 0–6)
ETHANOL BLD-MCNC: < 10 MG/DL — SIGNIFICANT CHANGE UP
GLUCOSE SERPL-MCNC: 73 MG/DL — SIGNIFICANT CHANGE UP (ref 70–99)
HCT VFR BLD CALC: 39.9 % — SIGNIFICANT CHANGE UP (ref 39–50)
HGB BLD-MCNC: 13.2 G/DL — SIGNIFICANT CHANGE UP (ref 13–17)
IMM GRANULOCYTES # BLD AUTO: 0.03 # — SIGNIFICANT CHANGE UP
IMM GRANULOCYTES NFR BLD AUTO: 0.3 % — SIGNIFICANT CHANGE UP (ref 0–1.5)
LYMPHOCYTES # BLD AUTO: 3.27 K/UL — SIGNIFICANT CHANGE UP (ref 1–3.3)
LYMPHOCYTES # BLD AUTO: 31.1 % — SIGNIFICANT CHANGE UP (ref 13–44)
MCHC RBC-ENTMCNC: 30.2 PG — SIGNIFICANT CHANGE UP (ref 27–34)
MCHC RBC-ENTMCNC: 33.1 % — SIGNIFICANT CHANGE UP (ref 32–36)
MCV RBC AUTO: 91.3 FL — SIGNIFICANT CHANGE UP (ref 80–100)
METHADONE UR-MCNC: NEGATIVE — SIGNIFICANT CHANGE UP
MONOCYTES # BLD AUTO: 0.86 K/UL — SIGNIFICANT CHANGE UP (ref 0–0.9)
MONOCYTES NFR BLD AUTO: 8.2 % — SIGNIFICANT CHANGE UP (ref 2–14)
NEUTROPHILS # BLD AUTO: 5.79 K/UL — SIGNIFICANT CHANGE UP (ref 1.8–7.4)
NEUTROPHILS NFR BLD AUTO: 55.1 % — SIGNIFICANT CHANGE UP (ref 43–77)
NRBC # FLD: 0 — SIGNIFICANT CHANGE UP
OPIATES UR-MCNC: NEGATIVE — SIGNIFICANT CHANGE UP
OXYCODONE UR-MCNC: NEGATIVE — SIGNIFICANT CHANGE UP
PCP UR-MCNC: NEGATIVE — SIGNIFICANT CHANGE UP
PLATELET # BLD AUTO: 246 K/UL — SIGNIFICANT CHANGE UP (ref 150–400)
PMV BLD: 12.7 FL — SIGNIFICANT CHANGE UP (ref 7–13)
POTASSIUM SERPL-MCNC: 4.1 MMOL/L — SIGNIFICANT CHANGE UP (ref 3.5–5.3)
POTASSIUM SERPL-SCNC: 4.1 MMOL/L — SIGNIFICANT CHANGE UP (ref 3.5–5.3)
RBC # BLD: 4.37 M/UL — SIGNIFICANT CHANGE UP (ref 4.2–5.8)
RBC # FLD: 13.9 % — SIGNIFICANT CHANGE UP (ref 10.3–14.5)
SALICYLATES SERPL-MCNC: < 5 MG/DL — LOW (ref 15–30)
SODIUM SERPL-SCNC: 137 MMOL/L — SIGNIFICANT CHANGE UP (ref 135–145)
TSH SERPL-MCNC: 1.12 UIU/ML — SIGNIFICANT CHANGE UP (ref 0.27–4.2)
WBC # BLD: 10.5 K/UL — SIGNIFICANT CHANGE UP (ref 3.8–10.5)
WBC # FLD AUTO: 10.5 K/UL — SIGNIFICANT CHANGE UP (ref 3.8–10.5)

## 2018-09-10 PROCEDURE — 99285 EMERGENCY DEPT VISIT HI MDM: CPT | Mod: GC

## 2018-09-10 RX ORDER — DIVALPROEX SODIUM 500 MG/1
500 TABLET, DELAYED RELEASE ORAL
Qty: 0 | Refills: 0 | Status: DISCONTINUED | OUTPATIENT
Start: 2018-09-10 | End: 2018-09-13

## 2018-09-10 RX ORDER — HALOPERIDOL DECANOATE 100 MG/ML
5 INJECTION INTRAMUSCULAR EVERY 6 HOURS
Qty: 0 | Refills: 0 | Status: DISCONTINUED | OUTPATIENT
Start: 2018-09-10 | End: 2018-09-19

## 2018-09-10 RX ORDER — VENLAFAXINE HCL 75 MG
37.5 CAPSULE, EXT RELEASE 24 HR ORAL DAILY
Qty: 0 | Refills: 0 | Status: DISCONTINUED | OUTPATIENT
Start: 2018-09-10 | End: 2018-09-13

## 2018-09-10 RX ORDER — DIPHENHYDRAMINE HCL 50 MG
50 CAPSULE ORAL EVERY 6 HOURS
Qty: 0 | Refills: 0 | Status: DISCONTINUED | OUTPATIENT
Start: 2018-09-10 | End: 2018-09-19

## 2018-09-10 RX ORDER — QUETIAPINE FUMARATE 200 MG/1
200 TABLET, FILM COATED ORAL AT BEDTIME
Qty: 0 | Refills: 0 | Status: DISCONTINUED | OUTPATIENT
Start: 2018-09-10 | End: 2018-09-19

## 2018-09-10 RX ORDER — THIAMINE MONONITRATE (VIT B1) 100 MG
100 TABLET ORAL DAILY
Qty: 0 | Refills: 0 | Status: COMPLETED | OUTPATIENT
Start: 2018-09-10 | End: 2018-09-13

## 2018-09-10 RX ORDER — FOLIC ACID 0.8 MG
1 TABLET ORAL DAILY
Qty: 0 | Refills: 0 | Status: COMPLETED | OUTPATIENT
Start: 2018-09-10 | End: 2018-09-17

## 2018-09-10 RX ORDER — HYDROXYZINE HCL 10 MG
50 TABLET ORAL EVERY 6 HOURS
Qty: 0 | Refills: 0 | Status: DISCONTINUED | OUTPATIENT
Start: 2018-09-10 | End: 2018-09-19

## 2018-09-10 RX ORDER — HALOPERIDOL DECANOATE 100 MG/ML
5 INJECTION INTRAMUSCULAR ONCE
Qty: 0 | Refills: 0 | Status: DISCONTINUED | OUTPATIENT
Start: 2018-09-10 | End: 2018-09-19

## 2018-09-10 RX ADMIN — DIVALPROEX SODIUM 500 MILLIGRAM(S): 500 TABLET, DELAYED RELEASE ORAL at 20:01

## 2018-09-10 RX ADMIN — QUETIAPINE FUMARATE 200 MILLIGRAM(S): 200 TABLET, FILM COATED ORAL at 20:01

## 2018-09-10 RX ADMIN — Medication 30 MILLILITER(S): at 18:40

## 2018-09-10 NOTE — ED ADULT NURSE NOTE - NSIMPLEMENTINTERV_GEN_ALL_ED
Implemented All Universal Safety Interventions:  [place] to call system. Call bell, personal items and telephone within reach. Instruct patient to call for assistance. Room bathroom lighting operational. Non-slip footwear when patient is off stretcher. Physically safe environment: no spills, clutter or unnecessary equipment. Stretcher in lowest position, wheels locked, appropriate side rails in place.

## 2018-09-10 NOTE — ED BEHAVIORAL HEALTH ASSESSMENT NOTE - CASE SUMMARY
Patient seen with resident on the AM of 9/10/18 after initial evaluation was performed overnight by Dr. Reddy was noted change in clinical presentation.  Upon reassessment, patient adamantly states that he no longer feels safe to consider an inpatient rehab program. He endorses suicidal intent with stated plans to kill himself via cutting his wrists or jumping in front of a train if he is discharged from the hospital. He states that if sent to drug rehab, he would kill himself there, and therefore is unsafe to be discharged to an outpatient program at this time.  He endorses overwhelming hopelessness, is not future-oriented and is unable to identify any protective factors. He reports that he have no support network, denying that he interacts with friends or family. Patient is unable to contract for safety and willing to be voluntarily admitted to inpatient psychiatric care for safety, stabilization and medication management.  Patient represents a high suicidal risk due to: ongoing substance and alcohol abuse, recent psychiatric discharge, homelessness, male gender, inability to plan for safe discharge home, not currently in treatment, hopelessness, and endorsement of active suicidal ideation with intent and plan. Chronic risks include extensive psych hx, past SA, unemployed, hx of abuse/trauma.  Admit to [facility] 6 on a 9.13, voluntary legal status.  No need for constant observation in a locked, supervised setting.  Recommend transport to unit accompanied by security for safety.

## 2018-09-10 NOTE — ED BEHAVIORAL HEALTH ASSESSMENT NOTE - HPI (INCLUDE ILLNESS QUALITY, SEVERITY, DURATION, TIMING, CONTEXT, MODIFYING FACTORS, ASSOCIATED SIGNS AND SYMPTOMS)
Patient is a 52 year old single male; undomiciled staying with a friend; noncaregiver; unemployed; PPH of polysubstance abuse and Bipolar disorder; multiple prior hospitalizations (had 3 hospitalizations for depression and SI since June 2018 (most recent [facility] 8/24/18-8/30/18 and [facility] 8/1/18-8/10/18) ; self reported multiple reported SA last year by overdose; no known history of violence or aggression; hx of incarceration at [facility] for kaylin last year;  active alcohol abuse and crack cocaine abuse; no known history of complicated withdrawal or seizures; PMH of HTN; BIB self reporting worsening depression and SI in context of ongoing substance abuse and medication non-compliance. On interview, pt is cooperative, makes fair eye contact, thought processes are linear and goal directed. He states that rereight he came to the hospital because he was "very depressed" and "needs help". The patient reports that he had several recent inpatient psychiatric admissions for depression and suicidal thoughts, and while he felt better on discharge, he did not follow up with treatment recommendations because he relapsed into using alcohol and drugs. He states that tonight he has contemplated cutting his wrist with a razor, but a friend "took the razor off him". He states that he last drank approx 12-20 beers on Saturday, and drinks that amount of alcohol 2-3 times per week. He also uses cocaine ,crack cocaine marijuana(last used on Saturday). He states that he uses up to $200 of cocaine per week. When asked how he funds his habit he replied "by hustling'. The patient shows some insight into the negative impact of his substance use on his mental health and states that he "wants to stop using", reflecting that last year he did a 28 day program at [facility] and felt well and not depressed when sober. He states that now he is wants to go to rehab, despite refusing substance referral during recent admissions. He reports chronic episodes of SI in context of substance abuse, but denies recent attempts in the past 6 months. He denies current suicidal intent or plan, but states that if he is discharged he will not feel safe. He denies AVH or delusions, although has experienced self deprecatory AH in the past.     As per Arpin, pt has had similar presentations in past, as per discharge summary from [facility]:     On interview, the patient is cooperative, anxious, depressed affect, linear thought process. States he is homeless, Reports suicidal ideation with plan to jump from the [place], and voices telling him he is worthless, and [place]. he was d/c'ed from [facility] on 8/10, and since then he has been noncompliant with seroquel/depakote/effexor for the past week, and relapsed onto cocaine and cannabis, last using both two days ago. unable to contract for safety and amenable to admission . Patient is a 52 year old single male; undomiciled staying with a friend; noncaregiver; unemployed; PPH of polysubstance abuse and Bipolar disorder; multiple prior hospitalizations (had 4 hospitalizations for depression and SI since June 2018 (most recent [facility] 8/24/18-8/30/18 and [facility] 8/1/18-8/10/18) ; self reported multiple reported SA last year by overdose; no known history of violence or aggression; hx of incarceration at [facility] for kaylin last year;  active alcohol abuse and crack cocaine abuse; no known history of complicated withdrawal or seizures; PMH of HTN; BIB self reporting worsening depression and SI in context of ongoing substance abuse and medication non-compliance. On interview, pt is cooperative, makes fair eye contact, thought processes are linear and goal directed. He states that tonight he came to the hospital because he was "very depressed" and "needs help". The patient reports that he had several recent inpatient psychiatric admissions for depression and suicidal thoughts, and while he felt better on discharge, he did not follow up with treatment recommendations because he relapsed into using alcohol and drugs. He states that tonight he has contemplated cutting his wrist with a razor, but a friend "took the razor off him". He states that he last drank approx 12-20 beers on Saturday, and drinks that amount of alcohol 2-3 times per week. He also uses cocaine ,crack cocaine marijuana(last used on Saturday). He states that he uses up to $200 of cocaine per week. When asked how he funds his habit he replied "by hustling'. The patient shows some insight into the negative impact of his substance use on his mental health and states that he "wants to stop using", reflecting that last year he did a 28 day program at [facility] and felt well and not depressed when sober. He states that now he is wants to go to rehab, despite refusing substance referral during recent admissions. He reports chronic episodes of SI in context of substance abuse, but denies recent attempts in the past 6 months. He reports current hopelessness, guilt and passive SI in the ED, but denies current suicidal intent or plan, but states that if he is discharged he will not feel safe, and is unable to engage in safe discharge planning. He denies AVH or delusions, although has experienced self deprecatory AH in the past.     As per M[place]opy, pt has had similar presentations in past, as per discharge summary from [facility]: "History of Present Illness 53 y/o male known from prior admits, with hx ?bipolar disorder/polysubstance abuse was admitted with vague thoughts to harm self. No psychosis or actual plan to harm self. No psychosis or actual plan to harm self. pt was restarted on meds; no need for detox protocol. Pt had no interest in rehab and requested d/c back to shelter. Relevant Past Psychiatric History prior admits/rehab. Relevant Past Substance Use History crack/alcohol are drugs of choice at this time. Relevant Psychosocial/Family History pt undomiciled. pt was restarted on meds; no need for detox protocol. Pt had no interest in rehab and requested d/c back to shelter." Patient was referred to [place] walk-in psychiatric clinic and continued on Depakote 500mg and Seroquel 200mg.

## 2018-09-10 NOTE — ED BEHAVIORAL HEALTH ASSESSMENT NOTE - PATIENT SEEN BY
demond Attending Psychiatrist without NP/Trainee Attending Psychiatrist supervising NP/Trainee and meeting pt

## 2018-09-10 NOTE — ED PROVIDER NOTE - MEDICAL DECISION MAKING DETAILS
53 y/o male h/o depression, bipolar d/o with depression and si tonight.  Does not seem intoxicated, calm and cooperative.  Will place in , obtain cbc, bmp, tsh, tox scrn, consult psych.

## 2018-09-10 NOTE — ED BEHAVIORAL HEALTH NOTE - BEHAVIORAL HEALTH NOTE
Upon reassessment, patient adamantly states that he no longer feels safe enough to consider an inpatient rehab program. He endorses 100% certainty that he will kill himself via cutting his wrists or jumping in front of a train if he is discharged from the hospital. He states that if sent to drug rehab, he would kill himself there, and that he is unsafe to leave the hospital for an outpatient program. He endorses overwhelming hopelessness, is not future-oriented and is unable to identify any protective factors. He reports that he have no support network, denying that he interacts with friends or family. Patient is unable to contract for safety and willing to be voluntarily admitted to inpatient psychiatric care for safety, stabilization and medication management.    High risk due to: ongoing substance and alcohol abuse, recent psychiatric discharge, homelessness, male gender, inability to plan for safe discharge home, not currently in treatment, hopelessness, and endorsement of active suicidal ideation with intent and plan. Chronic risks include extensive psych hx, past SA, unemployed, hx of abuse/trauma.

## 2018-09-10 NOTE — ED BEHAVIORAL HEALTH ASSESSMENT NOTE - PSYCHIATRIC ISSUES AND PLAN (INCLUDE STANDING AND PRN MEDICATION)
depression/mood disorder: restart Depakote 500mg qhs, seroquel 200mg hs, Haldol 5mg PO q 6 hrs prn agitation; Ativan 2mg PO q 6hrs prn anxiety/agitation, Diphenhydramine 50mg PO q 6hr prn EPS, insomnia or agitation.      Haldol 5mg IM; Ativan 2mg IM, Diphenhydramine 50mg IM once prn severe agitation (combativeness, assaultive behavior) after notification of a prescribing clinician. depression/mood disorder: restart Depakote 500mg qhs, seroquel 200mg hs, Effexor 37.5mg PO QD (started at 37.5mg to begin titration). Haldol 5mg PO q 6 hrs prn agitation; Ativan 2mg PO q 6hrs prn anxiety/agitation, Diphenhydramine 50mg PO q 6hr prn EPS, insomnia or agitation.      Haldol 5mg IM; Ativan 2mg IM, Diphenhydramine 50mg IM once prn severe agitation (combativeness, assaultive behavior) after notification of a prescribing clinician.

## 2018-09-10 NOTE — ED BEHAVIORAL HEALTH ASSESSMENT NOTE - OTHER PAST PSYCHIATRIC HISTORY (INCLUDE DETAILS REGARDING ONSET, COURSE OF ILLNESS, INPATIENT/OUTPATIENT TREATMENT)
PPH of polysubstance abuse and Bipolar disorder; multiple prior hospitalizations (had 4 hospitalizations for depression and SI since June 2018 (most recent [facility] 8/24/18-8/30/18 and [facility] 8/1/18-8/10/18)

## 2018-09-10 NOTE — ED BEHAVIORAL HEALTH NOTE - BEHAVIORAL HEALTH NOTE
Writer contacted [facility] for a census bed via email awaiting response via email with bed assignment.

## 2018-09-10 NOTE — ED ADULT NURSE NOTE - OBJECTIVE STATEMENT
pt a*o x3 c/o depression and si. pt states he has been depressed since last week and wants to hurt himself. states his last suicidal attempt was last year. denies hi/ etoh or drug use. pt states he was roaming the streets. calm and cooperative currently. belongings secured in closet across 21. nad noted. constant observation in place.

## 2018-09-10 NOTE — ED BEHAVIORAL HEALTH ASSESSMENT NOTE - SUICIDE RISK FACTORS
Hopelessness/Highly impulsive behavior/Substance abuse/dependence/Mood episode/Perceived burden on family and others

## 2018-09-10 NOTE — ED BEHAVIORAL HEALTH NOTE - BEHAVIORAL HEALTH NOTE
writer was informed patient was requesting rehab or inpatient psychiatric admissions.  Writer called [facility], however no rehab beds available this week.  Writer called [facility] and spoke to Ben who states beds are available.  Writer faxed intake packet to [facility].

## 2018-09-10 NOTE — ED BEHAVIORAL HEALTH ASSESSMENT NOTE - AXIS IV
Problems with primary support/Problem related to social environment/Occupational problems/Housing problems
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
## 2018-09-10 NOTE — ED PROVIDER NOTE - OBJECTIVE STATEMENT
53 y/o male with h/o depression, bipolar d/o here with c/o depression, si since this evening.  Pt states he will "open an artery."  States he had prior si attempts in past, most recently a year ago when he attempted to overdose.  Denies etoh or illegal drug use tonight.  Denies hi, a/v halluc.

## 2018-09-10 NOTE — ED BEHAVIORAL HEALTH NOTE - BEHAVIORAL HEALTH NOTE
Writer called [facility] and left a voicemail requesting a callback regarding referral.  [facility]

## 2018-09-10 NOTE — ED BEHAVIORAL HEALTH ASSESSMENT NOTE - SUMMARY
Patient is a 52 year old single male; undomiciled staying with a friend; noncaregiver; unemployed; PPH of polysubstance abuse and Bipolar disorder; multiple prior hospitalizations (had 4 hospitalizations for depression and SI since June 2018 (most recent SI 8/24/18-8/30/18 and [facility] 8/1/18-8/10/18) ; self reported multiple reported SA last year by overdose; no known history of violence or aggression; hx of incarceration at [facility] for kaylin last year;  active alcohol abuse and crack cocaine abuse; no known history of complicated withdrawal or seizures; PMH of HTN; BIB self reporting worsening depression and SI with vague plan to cut wrist in context of ongoing substance abuse and medication non-compliance. On interview, pt is cooperative, makes fair eye contact, thought processes are linear and goal directed. He states that tonight he came to the hospital because he was "very depressed" and "needs help", his mood is subjective and objectively depressed, and he endorses guilt, hopelessness and SI. He requests drug rehab. Dx: substance induced mood disorder, Alcohol use disorder, cocaine use disorder. Pt's severe ongoing substance abuse is highly contributory to current decompensation of depressed mood, in addition to posing a persistent barrier to treatment compliance. Patient has had 4 psychiatric admissions in past 3 months, during these admissions patient's SI resolved and pt refused referral to rehab. Now, pt reports wanting inpatient rehab and expressed some indication of emerging insight and motivation to change. Patient is a 52 year old single male; undomiciled staying with a friend; noncaregiver; unemployed; PPH of polysubstance abuse and Bipolar disorder; multiple prior hospitalizations (had 4 hospitalizations for depression and SI since June 2018 (most recent SI 8/24/18-8/30/18 and [facility] 8/1/18-8/10/18) ; self reported multiple reported SA last year by overdose; no known history of violence or aggression; hx of incarceration at [facility] for kaylin last year;  active alcohol abuse and crack cocaine abuse; no known history of complicated withdrawal or seizures; PMH of HTN; BIB self reporting worsening depression and SI with vague plan to cut wrist in context of ongoing substance abuse and medication non-compliance. On interview, pt is cooperative, makes fair eye contact, thought processes are linear and goal directed. He states that tonight he came to the hospital because he was "very depressed" and "needs help", his mood is subjective and objectively depressed, and he endorses guilt, hopelessness and SI in context of ongoing substance abuse. He requests drug rehab. Dx: substance induced mood disorder, Alcohol use disorder, cocaine use disorder. Pt's severe ongoing substance abuse is highly contributory to current decompensation of depressed mood, in addition to posing a persistent barrier to treatment compliance. Patient has had 4 psychiatric admissions in past 3 months, during these admissions patient's SI resolved and pt refused referral to rehab. Now, pt reports wanting inpatient rehab and expressed some indication of emerging insight and motivation to change. Patient is a 52 year old single male; undomiciled staying with a friend; noncaregiver; unemployed; PPH of polysubstance abuse and Bipolar disorder; multiple prior hospitalizations (had 4 hospitalizations for depression and SI since June 2018 (most recent SI 8/24/18-8/30/18 and [facility] 8/1/18-8/10/18) ; self reported multiple reported SA last year by overdose; no known history of violence or aggression; hx of incarceration at [facility] for kaylin last year;  active alcohol abuse and crack cocaine abuse; no known history of complicated withdrawal or seizures; PMH of HTN; BIB self reporting worsening depression and SI with vague plan to cut wrist in context of ongoing substance abuse and medication non-compliance. On interview, pt is cooperative, makes fair eye contact, thought processes are linear and goal directed. He states that tonight he came to the hospital because he was "very depressed" and "needs help", his mood is subjective and objectively depressed, and he endorses guilt, hopelessness and SI in context of ongoing substance abuse. He requests drug rehab. Dx: substance induced mood disorder, Alcohol use disorder, cocaine use disorder. Pt's severe ongoing substance abuse is highly contributory to current decompensation of depressed mood, in addition to posing a persistent barrier to treatment compliance. Patient has had 4 psychiatric admissions in past 3 months, during these admissions patient's SI resolved and pt refused referral to rehab. Now, pt reports wanting inpatient rehab and expressed some indication of emerging insight and motivation to change. it is recommended that patient would best benefit from inpatient rehab/BINA, and patient is seeking rehab bed. Patient is a 52 year old single male; undomiciled staying with a friend; noncaregiver; unemployed; PPH of polysubstance abuse and Bipolar disorder; multiple prior hospitalizations (had 4 hospitalizations for depression and SI since June 2018 (most recent SI 8/24/18-8/30/18 and [facility] 8/1/18-8/10/18) ; self reported multiple reported SA last year by overdose; no known history of violence or aggression; hx of incarceration at [facility] for kayiln last year;  active alcohol abuse and crack cocaine abuse; no known history of complicated withdrawal or seizures; PMH of HTN; BIB self reporting worsening depression and SI with plan to cut wrist in context of ongoing substance abuse and medication non-compliance.     On interview, pt is cooperative, makes fair eye contact, thought processes are linear and goal directed. He states that tonight he came to the hospital because he was "very depressed" and "needs help", his mood is subjective and objectively depressed, and he endorses guilt, hopelessness and SI in context of ongoing substance abuse. He requests drug rehab. Dx: substance induced mood disorder, Alcohol use disorder, cocaine use disorder. Pt's severe ongoing substance abuse is highly contributory to current decompensation of depressed mood, in addition to posing a persistent barrier to treatment compliance. Patient has had 4 psychiatric admissions in past 3 months, during these admissions patient's SI resolved and pt refused referral to rehab. Now, pt reports wanting inpatient rehab and expressed some indication of emerging insight and motivation to change.     Upon reassessment, patient states that he no longer feels safe enough to consider an inpatient rehab program. He endorses 100% certainty that he will kill himself via cutting his wrists or jumping in front of a train if he is discharged from the hospital. He states that if sent to drug rehab, he would kill himself there, and that he is unsafe to leave the hospital for an outpatient program. He endorses overwhelming hopelessness, is not future-oriented and is unable to identify any protective factors. He reports that he have no support network, denying that he interacts with friends or family. Patient is unable to contract for safety and willing to be voluntarily admitted to inpatient psychiatric care for safety, stabilization and medication management.

## 2018-09-10 NOTE — ED PROVIDER NOTE - PROGRESS NOTE DETAILS
MD CHO:  Per psych, Dr. Moody, pt would be appropriate for [facility] to obtain detox and eval for si.  Recommend to maintain 1:1 at this time, discuss with sw in am for rehab placement. Dr. Jacques: pt signed out to me at 8 AM today with plan to either admit to [facility] vs. [facility]; pt stable in ED, no acute issues; case discussed with psychiatry and will admit to [facility] as per psychiatry recommendations

## 2018-09-10 NOTE — ED BEHAVIORAL HEALTH ASSESSMENT NOTE - PRIMARY DX
Depression, unspecified depression type Deferred condition on axis II Bipolar disorder, current episode depressed, severe, without psychotic features

## 2018-09-10 NOTE — ED BEHAVIORAL HEALTH ASSESSMENT NOTE - RISK ASSESSMENT
Acute risks include ongoing substance and alcohol abuse, recent psychiatric discharge, homelessness, recent SI in context of ongoing substance abuse, unable to plan for safe discharge home, not currently in treatment, hopelessness. Chronic risks include extensive psych hx, past SA, unemployed, hx of abuse/trauma. Protective factors include current help seeking behavior. Pt's severe ongoing substance abuse is highly contributory to current decompensation of depressed mood, in addition to posing a persistent barrier to treatment compliance, pt is a danger to self as he is unable to care for himself and requires admission for treatment and stabilization, it is recommended that patient would best benefit from inpatient rehab/BINA, and patient is seeking rehab bed. Acute risks include ongoing substance and alcohol abuse, recent psychiatric discharge, homelessness, recent SI in context of ongoing substance abuse, unable to plan for safe discharge home, not currently in treatment, hopelessness. Chronic risks include extensive psych hx, past SA, unemployed, hx of abuse/trauma. Protective factors include current help seeking behavior and . Pt's severe ongoing substance abuse is highly contributory to current decompensation of depressed mood, in addition to posing a persistent barrier to treatment compliance,  it is recommended that patient would best benefit from inpatient rehab/BINA, and patient is seeking rehab bed. Acute risks include ongoing substance and alcohol abuse, recent psychiatric discharge, homelessness, recent SI in context of ongoing substance abuse, unable to plan for safe discharge home, not currently in treatment, hopelessness. Chronic risks include extensive psych hx, past SA, unemployed, hx of abuse/trauma. Protective factors include current help seeking behavior . Pt's severe ongoing substance abuse is highly contributory to current decompensation of depressed mood, in addition to posing a persistent barrier to treatment compliance,  it is recommended that patient would best benefit from inpatient rehab/BINA, and patient is seeking rehab bed. High risk due to: ongoing substance and alcohol abuse, recent psychiatric discharge, homelessness, male gender, inability to plan for safe discharge home, not currently in treatment, hopelessness, and endorsement of active suicidal ideation with intent and plan. Chronic risks include extensive psych hx, past SA, unemployed, hx of abuse/trauma.

## 2018-09-10 NOTE — ED BEHAVIORAL HEALTH NOTE - BEHAVIORAL HEALTH NOTE
Writer was informed patient was in need of inpatient rehab.  Writer called [facility], however they do not have a rehab.  Writer called [facility] for rehab, however they do not accept [facility] insurance.
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
## 2018-09-10 NOTE — ED BEHAVIORAL HEALTH ASSESSMENT NOTE - DIFFERENTIAL
substance induced mood disorder  alcohol use disorder  cocaine use disorder bipolar disorder, acute recurrent depressive episode   alcohol use disorder  cocaine use disorder

## 2018-09-10 NOTE — ED PROVIDER NOTE - CONSTITUTIONAL, MLM
normal... Well appearing, well nourished, awake, alert, oriented to person, place, time/situation and in no apparent distress. Graft Donor Site Bandage (Optional-Leave Blank If You Don't Want In Note): Steri-strips and a pressure bandage were applied to the donor site.

## 2018-09-10 NOTE — ED ADULT TRIAGE NOTE - CHIEF COMPLAINT QUOTE
Pt comes in for c/o suicidal thought and depression since last night. Pt denies HI only endorsing SI and depression at this time. Pt has reports attempt to harm self as recent as yesterday and comes in for help.

## 2018-09-10 NOTE — ED BEHAVIORAL HEALTH ASSESSMENT NOTE - SUBSTANCE ISSUES AND PLAN (INCLUDE STANDING AND PRN MEDICATION)
not in acute withdrawal, no need for detox not in acute withdrawal, no need for detox, monitoring in CIWA protocol

## 2018-09-10 NOTE — ED ADULT NURSE REASSESSMENT NOTE - NS ED NURSE REASSESS COMMENT FT1
received report from Scott SHETH, pt A&Ox3 on 1:1 for safety, PCA Emari with pt, pt calm and cooperative at this time resting no complaints noted, respirations equal and non labored abdomen soft and non tender, lungs clear, as per SCOTT Fitzpatrick pt belongings across from rm 21, safety maintained will monitor

## 2018-09-11 PROCEDURE — 99222 1ST HOSP IP/OBS MODERATE 55: CPT

## 2018-09-11 RX ADMIN — Medication 1 MILLIGRAM(S): at 11:03

## 2018-09-11 RX ADMIN — Medication 100 MILLIGRAM(S): at 11:03

## 2018-09-11 RX ADMIN — Medication 1 TABLET(S): at 11:03

## 2018-09-11 RX ADMIN — DIVALPROEX SODIUM 500 MILLIGRAM(S): 500 TABLET, DELAYED RELEASE ORAL at 11:03

## 2018-09-11 RX ADMIN — DIVALPROEX SODIUM 500 MILLIGRAM(S): 500 TABLET, DELAYED RELEASE ORAL at 21:16

## 2018-09-11 RX ADMIN — Medication 37.5 MILLIGRAM(S): at 11:03

## 2018-09-11 RX ADMIN — QUETIAPINE FUMARATE 200 MILLIGRAM(S): 200 TABLET, FILM COATED ORAL at 21:16

## 2018-09-12 PROCEDURE — 99232 SBSQ HOSP IP/OBS MODERATE 35: CPT

## 2018-09-12 RX ADMIN — Medication 37.5 MILLIGRAM(S): at 09:07

## 2018-09-12 RX ADMIN — Medication 100 MILLIGRAM(S): at 09:07

## 2018-09-12 RX ADMIN — DIVALPROEX SODIUM 500 MILLIGRAM(S): 500 TABLET, DELAYED RELEASE ORAL at 09:07

## 2018-09-12 RX ADMIN — Medication 1 TABLET(S): at 09:07

## 2018-09-12 RX ADMIN — DIVALPROEX SODIUM 500 MILLIGRAM(S): 500 TABLET, DELAYED RELEASE ORAL at 22:03

## 2018-09-12 RX ADMIN — QUETIAPINE FUMARATE 200 MILLIGRAM(S): 200 TABLET, FILM COATED ORAL at 22:03

## 2018-09-12 RX ADMIN — Medication 1 MILLIGRAM(S): at 09:07

## 2018-09-13 LAB — VALPROATE SERPL-MCNC: 24.3 UG/ML — LOW (ref 50–100)

## 2018-09-13 PROCEDURE — 99232 SBSQ HOSP IP/OBS MODERATE 35: CPT

## 2018-09-13 RX ORDER — VENLAFAXINE HCL 75 MG
75 CAPSULE, EXT RELEASE 24 HR ORAL DAILY
Qty: 0 | Refills: 0 | Status: DISCONTINUED | OUTPATIENT
Start: 2018-09-14 | End: 2018-09-19

## 2018-09-13 RX ORDER — DIVALPROEX SODIUM 500 MG/1
750 TABLET, DELAYED RELEASE ORAL
Qty: 0 | Refills: 0 | Status: DISCONTINUED | OUTPATIENT
Start: 2018-09-13 | End: 2018-09-19

## 2018-09-13 RX ADMIN — DIVALPROEX SODIUM 750 MILLIGRAM(S): 500 TABLET, DELAYED RELEASE ORAL at 21:50

## 2018-09-13 RX ADMIN — Medication 100 MILLIGRAM(S): at 09:09

## 2018-09-13 RX ADMIN — Medication 37.5 MILLIGRAM(S): at 09:09

## 2018-09-13 RX ADMIN — Medication 1 MILLIGRAM(S): at 09:09

## 2018-09-13 RX ADMIN — QUETIAPINE FUMARATE 200 MILLIGRAM(S): 200 TABLET, FILM COATED ORAL at 21:50

## 2018-09-13 RX ADMIN — DIVALPROEX SODIUM 500 MILLIGRAM(S): 500 TABLET, DELAYED RELEASE ORAL at 09:09

## 2018-09-13 RX ADMIN — Medication 1 TABLET(S): at 09:09

## 2018-09-14 PROCEDURE — 99232 SBSQ HOSP IP/OBS MODERATE 35: CPT

## 2018-09-14 RX ADMIN — DIVALPROEX SODIUM 750 MILLIGRAM(S): 500 TABLET, DELAYED RELEASE ORAL at 21:48

## 2018-09-14 RX ADMIN — Medication 1 MILLIGRAM(S): at 09:58

## 2018-09-14 RX ADMIN — Medication 1 TABLET(S): at 09:58

## 2018-09-14 RX ADMIN — Medication 75 MILLIGRAM(S): at 09:58

## 2018-09-14 RX ADMIN — QUETIAPINE FUMARATE 200 MILLIGRAM(S): 200 TABLET, FILM COATED ORAL at 21:48

## 2018-09-14 RX ADMIN — DIVALPROEX SODIUM 750 MILLIGRAM(S): 500 TABLET, DELAYED RELEASE ORAL at 09:58

## 2018-09-15 PROCEDURE — 99232 SBSQ HOSP IP/OBS MODERATE 35: CPT

## 2018-09-15 RX ADMIN — DIVALPROEX SODIUM 750 MILLIGRAM(S): 500 TABLET, DELAYED RELEASE ORAL at 21:27

## 2018-09-15 RX ADMIN — Medication 1 TABLET(S): at 09:17

## 2018-09-15 RX ADMIN — Medication 75 MILLIGRAM(S): at 09:17

## 2018-09-15 RX ADMIN — QUETIAPINE FUMARATE 200 MILLIGRAM(S): 200 TABLET, FILM COATED ORAL at 21:27

## 2018-09-15 RX ADMIN — DIVALPROEX SODIUM 750 MILLIGRAM(S): 500 TABLET, DELAYED RELEASE ORAL at 09:17

## 2018-09-15 RX ADMIN — Medication 1 MILLIGRAM(S): at 09:17

## 2018-09-16 VITALS — TEMPERATURE: 99 F

## 2018-09-16 PROCEDURE — 99232 SBSQ HOSP IP/OBS MODERATE 35: CPT

## 2018-09-16 RX ADMIN — Medication 1 TABLET(S): at 08:58

## 2018-09-16 RX ADMIN — QUETIAPINE FUMARATE 200 MILLIGRAM(S): 200 TABLET, FILM COATED ORAL at 22:32

## 2018-09-16 RX ADMIN — Medication 75 MILLIGRAM(S): at 08:58

## 2018-09-16 RX ADMIN — DIVALPROEX SODIUM 750 MILLIGRAM(S): 500 TABLET, DELAYED RELEASE ORAL at 22:32

## 2018-09-16 RX ADMIN — DIVALPROEX SODIUM 750 MILLIGRAM(S): 500 TABLET, DELAYED RELEASE ORAL at 08:58

## 2018-09-16 RX ADMIN — Medication 1 MILLIGRAM(S): at 08:58

## 2018-09-17 LAB — VALPROATE SERPL-MCNC: 70.5 UG/ML — SIGNIFICANT CHANGE UP (ref 50–100)

## 2018-09-17 PROCEDURE — 99232 SBSQ HOSP IP/OBS MODERATE 35: CPT

## 2018-09-17 RX ADMIN — Medication 50 MILLIGRAM(S): at 22:36

## 2018-09-17 RX ADMIN — Medication 1 MILLIGRAM(S): at 09:13

## 2018-09-17 RX ADMIN — DIVALPROEX SODIUM 750 MILLIGRAM(S): 500 TABLET, DELAYED RELEASE ORAL at 22:02

## 2018-09-17 RX ADMIN — Medication 75 MILLIGRAM(S): at 09:14

## 2018-09-17 RX ADMIN — QUETIAPINE FUMARATE 200 MILLIGRAM(S): 200 TABLET, FILM COATED ORAL at 22:02

## 2018-09-17 RX ADMIN — Medication 1 TABLET(S): at 09:13

## 2018-09-17 RX ADMIN — DIVALPROEX SODIUM 750 MILLIGRAM(S): 500 TABLET, DELAYED RELEASE ORAL at 09:13

## 2018-09-18 PROCEDURE — 99232 SBSQ HOSP IP/OBS MODERATE 35: CPT

## 2018-09-18 RX ORDER — DIVALPROEX SODIUM 500 MG/1
1 TABLET, DELAYED RELEASE ORAL
Qty: 60 | Refills: 0 | OUTPATIENT
Start: 2018-09-18

## 2018-09-18 RX ORDER — VENLAFAXINE HCL 75 MG
1 CAPSULE, EXT RELEASE 24 HR ORAL
Qty: 30 | Refills: 0 | OUTPATIENT
Start: 2018-09-18

## 2018-09-18 RX ORDER — QUETIAPINE FUMARATE 200 MG/1
1 TABLET, FILM COATED ORAL
Qty: 30 | Refills: 0 | OUTPATIENT
Start: 2018-09-18

## 2018-09-18 RX ADMIN — QUETIAPINE FUMARATE 200 MILLIGRAM(S): 200 TABLET, FILM COATED ORAL at 22:14

## 2018-09-18 RX ADMIN — Medication 75 MILLIGRAM(S): at 09:07

## 2018-09-18 RX ADMIN — DIVALPROEX SODIUM 750 MILLIGRAM(S): 500 TABLET, DELAYED RELEASE ORAL at 09:07

## 2018-09-18 RX ADMIN — DIVALPROEX SODIUM 750 MILLIGRAM(S): 500 TABLET, DELAYED RELEASE ORAL at 22:14

## 2018-09-19 PROCEDURE — 99238 HOSP IP/OBS DSCHRG MGMT 30/<: CPT

## 2018-09-19 RX ADMIN — DIVALPROEX SODIUM 750 MILLIGRAM(S): 500 TABLET, DELAYED RELEASE ORAL at 09:33

## 2018-09-19 RX ADMIN — Medication 75 MILLIGRAM(S): at 09:33

## 2018-09-30 NOTE — ED PROVIDER NOTE - DISPOSITION TYPE
ADMIT patient was critically ill... Patient was critically ill with a high probability of imminent or life threatening deterioration.

## 2018-10-10 ENCOUNTER — INPATIENT (INPATIENT)
Facility: HOSPITAL | Age: 53
LOS: 7 days | Discharge: HOME | End: 2018-10-18
Attending: PSYCHIATRY & NEUROLOGY | Admitting: PSYCHIATRY & NEUROLOGY
Payer: COMMERCIAL

## 2018-10-10 VITALS — HEIGHT: 69 IN | RESPIRATION RATE: 16 BRPM | TEMPERATURE: 98 F | WEIGHT: 240.3 LBS

## 2018-10-10 VITALS
OXYGEN SATURATION: 100 % | DIASTOLIC BLOOD PRESSURE: 102 MMHG | TEMPERATURE: 99 F | SYSTOLIC BLOOD PRESSURE: 152 MMHG | RESPIRATION RATE: 18 BRPM | HEART RATE: 72 BPM

## 2018-10-10 DIAGNOSIS — F14.20 COCAINE DEPENDENCE, UNCOMPLICATED: ICD-10-CM

## 2018-10-10 DIAGNOSIS — F12.20 CANNABIS DEPENDENCE, UNCOMPLICATED: ICD-10-CM

## 2018-10-10 DIAGNOSIS — F33.2 MAJOR DEPRESSIVE DISORDER, RECURRENT SEVERE WITHOUT PSYCHOTIC FEATURES: ICD-10-CM

## 2018-10-10 DIAGNOSIS — F33.9 MAJOR DEPRESSIVE DISORDER, RECURRENT, UNSPECIFIED: ICD-10-CM

## 2018-10-10 DIAGNOSIS — F10.10 ALCOHOL ABUSE, UNCOMPLICATED: ICD-10-CM

## 2018-10-10 LAB
ALBUMIN SERPL ELPH-MCNC: 4.7 G/DL — SIGNIFICANT CHANGE UP (ref 3.3–5)
ALP SERPL-CCNC: 76 U/L — SIGNIFICANT CHANGE UP (ref 40–120)
ALT FLD-CCNC: 51 U/L — HIGH (ref 4–41)
APAP SERPL-MCNC: < 15 UG/ML — LOW (ref 15–25)
AST SERPL-CCNC: 32 U/L — SIGNIFICANT CHANGE UP (ref 4–40)
BASOPHILS # BLD AUTO: 0.07 K/UL — SIGNIFICANT CHANGE UP (ref 0–0.2)
BASOPHILS NFR BLD AUTO: 0.5 % — SIGNIFICANT CHANGE UP (ref 0–2)
BILIRUB SERPL-MCNC: 0.2 MG/DL — SIGNIFICANT CHANGE UP (ref 0.2–1.2)
BUN SERPL-MCNC: 10 MG/DL — SIGNIFICANT CHANGE UP (ref 7–23)
CALCIUM SERPL-MCNC: 9.3 MG/DL — SIGNIFICANT CHANGE UP (ref 8.4–10.5)
CHLORIDE SERPL-SCNC: 96 MMOL/L — LOW (ref 98–107)
CO2 SERPL-SCNC: 24 MMOL/L — SIGNIFICANT CHANGE UP (ref 22–31)
CREAT SERPL-MCNC: 0.65 MG/DL — SIGNIFICANT CHANGE UP (ref 0.5–1.3)
EOSINOPHIL # BLD AUTO: 0.16 K/UL — SIGNIFICANT CHANGE UP (ref 0–0.5)
EOSINOPHIL NFR BLD AUTO: 1.2 % — SIGNIFICANT CHANGE UP (ref 0–6)
ETHANOL BLD-MCNC: < 10 MG/DL — SIGNIFICANT CHANGE UP
GLUCOSE SERPL-MCNC: 108 MG/DL — HIGH (ref 70–99)
HCT VFR BLD CALC: 40.2 % — SIGNIFICANT CHANGE UP (ref 39–50)
HGB BLD-MCNC: 13.3 G/DL — SIGNIFICANT CHANGE UP (ref 13–17)
IMM GRANULOCYTES # BLD AUTO: 0.08 # — SIGNIFICANT CHANGE UP
IMM GRANULOCYTES NFR BLD AUTO: 0.6 % — SIGNIFICANT CHANGE UP (ref 0–1.5)
LYMPHOCYTES # BLD AUTO: 22.2 % — SIGNIFICANT CHANGE UP (ref 13–44)
LYMPHOCYTES # BLD AUTO: 3.06 K/UL — SIGNIFICANT CHANGE UP (ref 1–3.3)
MCHC RBC-ENTMCNC: 29.8 PG — SIGNIFICANT CHANGE UP (ref 27–34)
MCHC RBC-ENTMCNC: 33.1 % — SIGNIFICANT CHANGE UP (ref 32–36)
MCV RBC AUTO: 89.9 FL — SIGNIFICANT CHANGE UP (ref 80–100)
MONOCYTES # BLD AUTO: 0.99 K/UL — HIGH (ref 0–0.9)
MONOCYTES NFR BLD AUTO: 7.2 % — SIGNIFICANT CHANGE UP (ref 2–14)
NEUTROPHILS # BLD AUTO: 9.41 K/UL — HIGH (ref 1.8–7.4)
NEUTROPHILS NFR BLD AUTO: 68.3 % — SIGNIFICANT CHANGE UP (ref 43–77)
NRBC # FLD: 0 — SIGNIFICANT CHANGE UP
PLATELET # BLD AUTO: 154 K/UL — SIGNIFICANT CHANGE UP (ref 150–400)
PMV BLD: 14.1 FL — HIGH (ref 7–13)
POTASSIUM SERPL-MCNC: 4.2 MMOL/L — SIGNIFICANT CHANGE UP (ref 3.5–5.3)
POTASSIUM SERPL-SCNC: 4.2 MMOL/L — SIGNIFICANT CHANGE UP (ref 3.5–5.3)
PROT SERPL-MCNC: 8 G/DL — SIGNIFICANT CHANGE UP (ref 6–8.3)
RBC # BLD: 4.47 M/UL — SIGNIFICANT CHANGE UP (ref 4.2–5.8)
RBC # FLD: 14.1 % — SIGNIFICANT CHANGE UP (ref 10.3–14.5)
SALICYLATES SERPL-MCNC: < 5 MG/DL — LOW (ref 15–30)
SODIUM SERPL-SCNC: 139 MMOL/L — SIGNIFICANT CHANGE UP (ref 135–145)
TSH SERPL-MCNC: 1.62 UIU/ML — SIGNIFICANT CHANGE UP (ref 0.27–4.2)
VALPROATE SERPL-MCNC: 21 UG/ML — LOW (ref 50–100)
WBC # BLD: 13.77 K/UL — HIGH (ref 3.8–10.5)
WBC # FLD AUTO: 13.77 K/UL — HIGH (ref 3.8–10.5)

## 2018-10-10 PROCEDURE — 99285 EMERGENCY DEPT VISIT HI MDM: CPT | Mod: GC

## 2018-10-10 RX ORDER — DIPHENHYDRAMINE HCL 50 MG
50 CAPSULE ORAL ONCE
Qty: 0 | Refills: 0 | Status: DISCONTINUED | OUTPATIENT
Start: 2018-10-10 | End: 2018-10-18

## 2018-10-10 RX ORDER — HALOPERIDOL DECANOATE 100 MG/ML
5 INJECTION INTRAMUSCULAR ONCE
Qty: 0 | Refills: 0 | Status: DISCONTINUED | OUTPATIENT
Start: 2018-10-10 | End: 2018-10-18

## 2018-10-10 RX ORDER — ACETAMINOPHEN 500 MG
975 TABLET ORAL ONCE
Qty: 0 | Refills: 0 | Status: COMPLETED | OUTPATIENT
Start: 2018-10-10 | End: 2018-10-10

## 2018-10-10 RX ORDER — HALOPERIDOL DECANOATE 100 MG/ML
5 INJECTION INTRAMUSCULAR EVERY 6 HOURS
Qty: 0 | Refills: 0 | Status: DISCONTINUED | OUTPATIENT
Start: 2018-10-10 | End: 2018-10-18

## 2018-10-10 RX ORDER — DIPHENHYDRAMINE HCL 50 MG
50 CAPSULE ORAL EVERY 6 HOURS
Qty: 0 | Refills: 0 | Status: DISCONTINUED | OUTPATIENT
Start: 2018-10-10 | End: 2018-10-18

## 2018-10-10 RX ADMIN — Medication 2 MILLIGRAM(S): at 23:20

## 2018-10-10 RX ADMIN — Medication 50 MILLIGRAM(S): at 23:20

## 2018-10-10 RX ADMIN — Medication 975 MILLIGRAM(S): at 09:18

## 2018-10-10 RX ADMIN — Medication 975 MILLIGRAM(S): at 09:51

## 2018-10-10 NOTE — ED BEHAVIORAL HEALTH ASSESSMENT NOTE - SUBSTANCE ISSUES AND PLAN (INCLUDE STANDING AND PRN MEDICATION)
Patient at low risk for acute withdrawal: he denies daily alcohol use, and there is no evidence on exam of acute withdrawal.

## 2018-10-10 NOTE — ED BEHAVIORAL HEALTH ASSESSMENT NOTE - SUICIDAL IDEATION DETAILS
Patient is at imminent risk of harm due to active suicidal intent/plan in the context of depressed mood and psychosocial stressors.

## 2018-10-10 NOTE — ED BEHAVIORAL HEALTH ASSESSMENT NOTE - CURRENT MEDICATION
Previously on Depakote 750mg PO BID, Seroquel 200mg PO qHS, and Effexor 75mg PO daily but reports not taking meds in the past 3 days.

## 2018-10-10 NOTE — ED BEHAVIORAL HEALTH ASSESSMENT NOTE - OTHER PAST PSYCHIATRIC HISTORY (INCLUDE DETAILS REGARDING ONSET, COURSE OF ILLNESS, INPATIENT/OUTPATIENT TREATMENT)
History of polysubstance abuse (cocaine, alcohol, cannabis). Multiple prior hospitalizations (had 5 hospitalizations for depression and SI since June 2018 - last at [facility] L6 9/10/18 - 9/19/18). Reports one past suicide attempt via overdose within the past year for which he received medical treatment. Has not been adherent with recommended outpatient psychiatric treatment.
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
## 2018-10-10 NOTE — ED BEHAVIORAL HEALTH ASSESSMENT NOTE - RISK ASSESSMENT
Patient is at elevated acute risk of suicide due to active suicidal ideation/intent/plan, acute mood episode, and ongoing substance abuse. His chronic risk factors include history of mood episodes, prior psychiatric hospitalizations, history of self-injurious behavior, reported history of suicide attempt, history of abuse/trauma, and history of chronic relapsing substance abuse. The patient is at imminent risk of harm to himself and meets criteria for involuntary inpatient level of care for safety, stabilization, and treatment.

## 2018-10-10 NOTE — ED BEHAVIORAL HEALTH ASSESSMENT NOTE - SUICIDE RISK FACTORS
Hopelessness/Highly impulsive behavior/Substance abuse/dependence/Anhedonia/Mood episode/Perceived burden on family and others/Unable to engage in safety planning
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
## 2018-10-10 NOTE — ED BEHAVIORAL HEALTH ASSESSMENT NOTE - AXIS IV
Problem related to social environment/Economic problems/Housing problems/Problems with primary support/Occupational problems

## 2018-10-10 NOTE — ED BEHAVIORAL HEALTH NOTE - BEHAVIORAL HEALTH NOTE
Writer called [facility] central intake for bed. Pt pending voluntary admission to [facility].

## 2018-10-10 NOTE — ED BEHAVIORAL HEALTH ASSESSMENT NOTE - ACTIVATING EVENTS/STRESSORS
Non-compliant with treatment/Pending incarceration or homelessness/Recent losses/Recent humiliation/shame

## 2018-10-10 NOTE — ED ADULT NURSE NOTE - OBJECTIVE STATEMENT
Received pt in  pt c/o depression & Si pt denies Hi/AVh presently emotional support provided safety & comfort measures maintained eval on going.

## 2018-10-10 NOTE — ED PROVIDER NOTE - OBJECTIVE STATEMENT
Attendinyo male presents with not feeling well and depression.  Pt states he is depressed and stopped taking all of his medication (Effexor, seroquel and depakote) 3 days ago.  Pt does not want to live.  States he plans to jump in front of a train.  Pt has a mild headache and asked for something for headache.  no fever.  no vomiting or nausea.

## 2018-10-10 NOTE — ED ADULT TRIAGE NOTE - CHIEF COMPLAINT QUOTE
c/o depression with suicide thought, patient said he will jump to the train to kill himself. Hx: HTN, depression. Patient was evaluated by Dr. Bahena, c/o depression with suicide thought, patient said he will jump in front of the moving train to kill himself. Hx: HTN, depression. Patient was evaluated by Dr. Bahena in triage.

## 2018-10-10 NOTE — ED ADULT NURSE NOTE - CHIEF COMPLAINT QUOTE
c/o depression with suicide thought, patient said he will jump in front of the moving train to kill himself. Hx: HTN, depression. Patient was evaluated by Dr. Bahena in triage.

## 2018-10-11 LAB
CHOLEST SERPL-MCNC: 261 MG/DL — HIGH (ref 120–199)
HBA1C BLD-MCNC: 6.3 % — HIGH (ref 4–5.6)
HDLC SERPL-MCNC: 39 MG/DL — SIGNIFICANT CHANGE UP (ref 35–55)
LIPID PNL WITH DIRECT LDL SERPL: 207 MG/DL — SIGNIFICANT CHANGE UP
TRIGL SERPL-MCNC: 224 MG/DL — HIGH (ref 10–149)

## 2018-10-11 PROCEDURE — 99222 1ST HOSP IP/OBS MODERATE 55: CPT

## 2018-10-11 RX ORDER — QUETIAPINE FUMARATE 200 MG/1
100 TABLET, FILM COATED ORAL
Qty: 0 | Refills: 0 | Status: DISCONTINUED | OUTPATIENT
Start: 2018-10-11 | End: 2018-10-12

## 2018-10-11 RX ADMIN — Medication 2 MILLIGRAM(S): at 09:02

## 2018-10-11 RX ADMIN — QUETIAPINE FUMARATE 100 MILLIGRAM(S): 200 TABLET, FILM COATED ORAL at 22:38

## 2018-10-11 RX ADMIN — Medication 50 MILLIGRAM(S): at 09:02

## 2018-10-11 RX ADMIN — Medication 2 MILLIGRAM(S): at 22:35

## 2018-10-12 PROCEDURE — 99232 SBSQ HOSP IP/OBS MODERATE 35: CPT

## 2018-10-12 RX ORDER — QUETIAPINE FUMARATE 200 MG/1
200 TABLET, FILM COATED ORAL AT BEDTIME
Qty: 0 | Refills: 0 | Status: DISCONTINUED | OUTPATIENT
Start: 2018-10-12 | End: 2018-10-18

## 2018-10-12 RX ORDER — DIVALPROEX SODIUM 500 MG/1
500 TABLET, DELAYED RELEASE ORAL
Qty: 0 | Refills: 0 | Status: DISCONTINUED | OUTPATIENT
Start: 2018-10-12 | End: 2018-10-18

## 2018-10-12 RX ADMIN — QUETIAPINE FUMARATE 200 MILLIGRAM(S): 200 TABLET, FILM COATED ORAL at 21:57

## 2018-10-12 RX ADMIN — DIVALPROEX SODIUM 500 MILLIGRAM(S): 500 TABLET, DELAYED RELEASE ORAL at 21:57

## 2018-10-12 RX ADMIN — QUETIAPINE FUMARATE 100 MILLIGRAM(S): 200 TABLET, FILM COATED ORAL at 09:04

## 2018-10-13 PROCEDURE — 99232 SBSQ HOSP IP/OBS MODERATE 35: CPT

## 2018-10-13 RX ORDER — ACETAMINOPHEN 500 MG
650 TABLET ORAL ONCE
Qty: 0 | Refills: 0 | Status: DISCONTINUED | OUTPATIENT
Start: 2018-10-13 | End: 2018-10-14

## 2018-10-13 RX ADMIN — DIVALPROEX SODIUM 500 MILLIGRAM(S): 500 TABLET, DELAYED RELEASE ORAL at 21:25

## 2018-10-13 RX ADMIN — QUETIAPINE FUMARATE 200 MILLIGRAM(S): 200 TABLET, FILM COATED ORAL at 21:25

## 2018-10-13 RX ADMIN — DIVALPROEX SODIUM 500 MILLIGRAM(S): 500 TABLET, DELAYED RELEASE ORAL at 08:57

## 2018-10-14 PROCEDURE — 99232 SBSQ HOSP IP/OBS MODERATE 35: CPT

## 2018-10-14 RX ORDER — ACETAMINOPHEN 500 MG
650 TABLET ORAL ONCE
Qty: 0 | Refills: 0 | Status: COMPLETED | OUTPATIENT
Start: 2018-10-14 | End: 2018-10-15

## 2018-10-14 RX ADMIN — DIVALPROEX SODIUM 500 MILLIGRAM(S): 500 TABLET, DELAYED RELEASE ORAL at 22:55

## 2018-10-14 RX ADMIN — QUETIAPINE FUMARATE 200 MILLIGRAM(S): 200 TABLET, FILM COATED ORAL at 22:55

## 2018-10-14 RX ADMIN — DIVALPROEX SODIUM 500 MILLIGRAM(S): 500 TABLET, DELAYED RELEASE ORAL at 09:35

## 2018-10-15 PROCEDURE — 99232 SBSQ HOSP IP/OBS MODERATE 35: CPT

## 2018-10-15 RX ADMIN — Medication 650 MILLIGRAM(S): at 14:11

## 2018-10-15 RX ADMIN — DIVALPROEX SODIUM 500 MILLIGRAM(S): 500 TABLET, DELAYED RELEASE ORAL at 09:11

## 2018-10-15 RX ADMIN — QUETIAPINE FUMARATE 200 MILLIGRAM(S): 200 TABLET, FILM COATED ORAL at 22:52

## 2018-10-15 RX ADMIN — DIVALPROEX SODIUM 500 MILLIGRAM(S): 500 TABLET, DELAYED RELEASE ORAL at 22:52

## 2018-10-16 PROCEDURE — 99232 SBSQ HOSP IP/OBS MODERATE 35: CPT

## 2018-10-16 RX ORDER — BENZOCAINE AND MENTHOL 5; 1 G/100ML; G/100ML
1 LIQUID ORAL ONCE
Qty: 0 | Refills: 0 | Status: COMPLETED | OUTPATIENT
Start: 2018-10-16 | End: 2018-10-16

## 2018-10-16 RX ADMIN — DIVALPROEX SODIUM 500 MILLIGRAM(S): 500 TABLET, DELAYED RELEASE ORAL at 08:46

## 2018-10-16 RX ADMIN — QUETIAPINE FUMARATE 200 MILLIGRAM(S): 200 TABLET, FILM COATED ORAL at 23:17

## 2018-10-16 RX ADMIN — DIVALPROEX SODIUM 500 MILLIGRAM(S): 500 TABLET, DELAYED RELEASE ORAL at 23:17

## 2018-10-16 RX ADMIN — Medication 1 APPLICATION(S): at 23:58

## 2018-10-17 LAB
ALBUMIN SERPL ELPH-MCNC: 4.3 G/DL — SIGNIFICANT CHANGE UP (ref 3.3–5)
ALP SERPL-CCNC: 69 U/L — SIGNIFICANT CHANGE UP (ref 40–120)
ALT FLD-CCNC: 39 U/L — SIGNIFICANT CHANGE UP (ref 4–41)
AST SERPL-CCNC: 23 U/L — SIGNIFICANT CHANGE UP (ref 4–40)
BASOPHILS # BLD AUTO: 0.07 K/UL — SIGNIFICANT CHANGE UP (ref 0–0.2)
BASOPHILS NFR BLD AUTO: 0.8 % — SIGNIFICANT CHANGE UP (ref 0–2)
BILIRUB DIRECT SERPL-MCNC: 0.1 MG/DL — SIGNIFICANT CHANGE UP (ref 0.1–0.2)
BILIRUB SERPL-MCNC: < 0.2 MG/DL — LOW (ref 0.2–1.2)
EOSINOPHIL # BLD AUTO: 0.5 K/UL — SIGNIFICANT CHANGE UP (ref 0–0.5)
EOSINOPHIL NFR BLD AUTO: 5.9 % — SIGNIFICANT CHANGE UP (ref 0–6)
HCT VFR BLD CALC: 41.9 % — SIGNIFICANT CHANGE UP (ref 39–50)
HGB BLD-MCNC: 13.5 G/DL — SIGNIFICANT CHANGE UP (ref 13–17)
IMM GRANULOCYTES # BLD AUTO: 0.04 # — SIGNIFICANT CHANGE UP
IMM GRANULOCYTES NFR BLD AUTO: 0.5 % — SIGNIFICANT CHANGE UP (ref 0–1.5)
LYMPHOCYTES # BLD AUTO: 3.83 K/UL — HIGH (ref 1–3.3)
LYMPHOCYTES # BLD AUTO: 45.3 % — HIGH (ref 13–44)
MCHC RBC-ENTMCNC: 29.4 PG — SIGNIFICANT CHANGE UP (ref 27–34)
MCHC RBC-ENTMCNC: 32.2 % — SIGNIFICANT CHANGE UP (ref 32–36)
MCV RBC AUTO: 91.3 FL — SIGNIFICANT CHANGE UP (ref 80–100)
MONOCYTES # BLD AUTO: 0.57 K/UL — SIGNIFICANT CHANGE UP (ref 0–0.9)
MONOCYTES NFR BLD AUTO: 6.7 % — SIGNIFICANT CHANGE UP (ref 2–14)
NEUTROPHILS # BLD AUTO: 3.44 K/UL — SIGNIFICANT CHANGE UP (ref 1.8–7.4)
NEUTROPHILS NFR BLD AUTO: 40.8 % — LOW (ref 43–77)
NRBC # FLD: 0 — SIGNIFICANT CHANGE UP
PLATELET # BLD AUTO: 179 K/UL — SIGNIFICANT CHANGE UP (ref 150–400)
PMV BLD: 14.1 FL — HIGH (ref 7–13)
PROT SERPL-MCNC: 7.4 G/DL — SIGNIFICANT CHANGE UP (ref 6–8.3)
RBC # BLD: 4.59 M/UL — SIGNIFICANT CHANGE UP (ref 4.2–5.8)
RBC # FLD: 13.7 % — SIGNIFICANT CHANGE UP (ref 10.3–14.5)
VALPROATE SERPL-MCNC: 52.2 UG/ML — SIGNIFICANT CHANGE UP (ref 50–100)
WBC # BLD: 8.45 K/UL — SIGNIFICANT CHANGE UP (ref 3.8–10.5)
WBC # FLD AUTO: 8.45 K/UL — SIGNIFICANT CHANGE UP (ref 3.8–10.5)

## 2018-10-17 PROCEDURE — 99232 SBSQ HOSP IP/OBS MODERATE 35: CPT

## 2018-10-17 RX ADMIN — DIVALPROEX SODIUM 500 MILLIGRAM(S): 500 TABLET, DELAYED RELEASE ORAL at 21:32

## 2018-10-17 RX ADMIN — Medication 1 APPLICATION(S): at 21:55

## 2018-10-17 RX ADMIN — DIVALPROEX SODIUM 500 MILLIGRAM(S): 500 TABLET, DELAYED RELEASE ORAL at 08:54

## 2018-10-17 RX ADMIN — QUETIAPINE FUMARATE 200 MILLIGRAM(S): 200 TABLET, FILM COATED ORAL at 21:32

## 2018-10-17 RX ADMIN — Medication 1 APPLICATION(S): at 15:55

## 2018-10-18 PROCEDURE — 99238 HOSP IP/OBS DSCHRG MGMT 30/<: CPT

## 2018-10-18 RX ORDER — DIVALPROEX SODIUM 500 MG/1
1 TABLET, DELAYED RELEASE ORAL
Qty: 60 | Refills: 0 | OUTPATIENT
Start: 2018-10-18 | End: 2018-11-16

## 2018-10-18 RX ORDER — QUETIAPINE FUMARATE 200 MG/1
1 TABLET, FILM COATED ORAL
Qty: 30 | Refills: 0 | OUTPATIENT
Start: 2018-10-18 | End: 2018-11-16

## 2018-10-18 RX ADMIN — DIVALPROEX SODIUM 500 MILLIGRAM(S): 500 TABLET, DELAYED RELEASE ORAL at 09:23

## 2018-12-13 ENCOUNTER — EMERGENCY (EMERGENCY)
Facility: HOSPITAL | Age: 53
LOS: 1 days | Discharge: HOME | End: 2018-12-13
Attending: EMERGENCY MEDICINE | Admitting: EMERGENCY MEDICINE
Payer: COMMERCIAL

## 2018-12-13 ENCOUNTER — INPATIENT (INPATIENT)
Facility: HOSPITAL | Age: 53
LOS: 3 days | Discharge: HOME | End: 2018-12-17
Attending: PSYCHIATRY & NEUROLOGY | Admitting: PSYCHIATRY & NEUROLOGY
Payer: COMMERCIAL

## 2018-12-13 VITALS
DIASTOLIC BLOOD PRESSURE: 68 MMHG | OXYGEN SATURATION: 94 % | SYSTOLIC BLOOD PRESSURE: 119 MMHG | HEART RATE: 65 BPM | TEMPERATURE: 97 F | RESPIRATION RATE: 18 BRPM

## 2018-12-13 VITALS
TEMPERATURE: 98 F | OXYGEN SATURATION: 96 % | HEART RATE: 78 BPM | RESPIRATION RATE: 19 BRPM | WEIGHT: 240.08 LBS | SYSTOLIC BLOOD PRESSURE: 148 MMHG | DIASTOLIC BLOOD PRESSURE: 87 MMHG

## 2018-12-13 VITALS — TEMPERATURE: 98 F | HEIGHT: 70 IN | OXYGEN SATURATION: 97 % | RESPIRATION RATE: 20 BRPM | WEIGHT: 240.08 LBS

## 2018-12-13 DIAGNOSIS — F33.9 MAJOR DEPRESSIVE DISORDER, RECURRENT, UNSPECIFIED: ICD-10-CM

## 2018-12-13 DIAGNOSIS — Z79.899 OTHER LONG TERM (CURRENT) DRUG THERAPY: ICD-10-CM

## 2018-12-13 DIAGNOSIS — F32.9 MAJOR DEPRESSIVE DISORDER, SINGLE EPISODE, UNSPECIFIED: ICD-10-CM

## 2018-12-13 DIAGNOSIS — I10 ESSENTIAL (PRIMARY) HYPERTENSION: ICD-10-CM

## 2018-12-13 DIAGNOSIS — F12.10 CANNABIS ABUSE, UNCOMPLICATED: ICD-10-CM

## 2018-12-13 DIAGNOSIS — F10.10 ALCOHOL ABUSE, UNCOMPLICATED: ICD-10-CM

## 2018-12-13 DIAGNOSIS — F14.10 COCAINE ABUSE, UNCOMPLICATED: ICD-10-CM

## 2018-12-13 DIAGNOSIS — Z91.013 ALLERGY TO SEAFOOD: ICD-10-CM

## 2018-12-13 DIAGNOSIS — R45.851 SUICIDAL IDEATIONS: ICD-10-CM

## 2018-12-13 LAB
ALBUMIN SERPL ELPH-MCNC: 4.8 G/DL — SIGNIFICANT CHANGE UP (ref 3.5–5.2)
ALP SERPL-CCNC: 82 U/L — SIGNIFICANT CHANGE UP (ref 30–115)
ALT FLD-CCNC: 93 U/L — HIGH (ref 0–41)
AMPHET UR-MCNC: NEGATIVE — SIGNIFICANT CHANGE UP
ANION GAP SERPL CALC-SCNC: 18 MMOL/L — HIGH (ref 7–14)
APAP SERPL-MCNC: <5 UG/ML — LOW (ref 10–30)
APPEARANCE UR: ABNORMAL
AST SERPL-CCNC: 47 U/L — HIGH (ref 0–41)
BACTERIA # UR AUTO: ABNORMAL /HPF
BARBITURATES UR SCN-MCNC: NEGATIVE — SIGNIFICANT CHANGE UP
BASOPHILS # BLD AUTO: 0.06 K/UL — SIGNIFICANT CHANGE UP (ref 0–0.2)
BASOPHILS NFR BLD AUTO: 0.6 % — SIGNIFICANT CHANGE UP (ref 0–1)
BENZODIAZ UR-MCNC: NEGATIVE — SIGNIFICANT CHANGE UP
BILIRUB SERPL-MCNC: 0.4 MG/DL — SIGNIFICANT CHANGE UP (ref 0.2–1.2)
BILIRUB UR-MCNC: NEGATIVE — SIGNIFICANT CHANGE UP
BUN SERPL-MCNC: 15 MG/DL — SIGNIFICANT CHANGE UP (ref 10–20)
CALCIUM SERPL-MCNC: 9.5 MG/DL — SIGNIFICANT CHANGE UP (ref 8.5–10.1)
CHLORIDE SERPL-SCNC: 93 MMOL/L — LOW (ref 98–110)
CO2 SERPL-SCNC: 24 MMOL/L — SIGNIFICANT CHANGE UP (ref 17–32)
COCAINE METAB.OTHER UR-MCNC: POSITIVE
COLOR SPEC: SIGNIFICANT CHANGE UP
CREAT SERPL-MCNC: 0.9 MG/DL — SIGNIFICANT CHANGE UP (ref 0.7–1.5)
DIFF PNL FLD: NEGATIVE — SIGNIFICANT CHANGE UP
EOSINOPHIL # BLD AUTO: 0.24 K/UL — SIGNIFICANT CHANGE UP (ref 0–0.7)
EOSINOPHIL NFR BLD AUTO: 2.5 % — SIGNIFICANT CHANGE UP (ref 0–8)
ETHANOL SERPL-MCNC: <10 MG/DL — HIGH
GLUCOSE SERPL-MCNC: 104 MG/DL — HIGH (ref 70–99)
GLUCOSE UR QL: NEGATIVE — SIGNIFICANT CHANGE UP
HCT VFR BLD CALC: 39.4 % — LOW (ref 42–52)
HGB BLD-MCNC: 13.4 G/DL — LOW (ref 14–18)
IMM GRANULOCYTES NFR BLD AUTO: 0.3 % — SIGNIFICANT CHANGE UP (ref 0.1–0.3)
KETONES UR-MCNC: NEGATIVE — SIGNIFICANT CHANGE UP
LEUKOCYTE ESTERASE UR-ACNC: NEGATIVE — SIGNIFICANT CHANGE UP
LYMPHOCYTES # BLD AUTO: 3.09 K/UL — SIGNIFICANT CHANGE UP (ref 1.2–3.4)
LYMPHOCYTES # BLD AUTO: 31.9 % — SIGNIFICANT CHANGE UP (ref 20.5–51.1)
MCHC RBC-ENTMCNC: 29.5 PG — SIGNIFICANT CHANGE UP (ref 27–31)
MCHC RBC-ENTMCNC: 34 G/DL — SIGNIFICANT CHANGE UP (ref 32–37)
MCV RBC AUTO: 86.8 FL — SIGNIFICANT CHANGE UP (ref 80–94)
METHADONE UR-MCNC: NEGATIVE — SIGNIFICANT CHANGE UP
MONOCYTES # BLD AUTO: 0.98 K/UL — HIGH (ref 0.1–0.6)
MONOCYTES NFR BLD AUTO: 10.1 % — HIGH (ref 1.7–9.3)
NEUTROPHILS # BLD AUTO: 5.29 K/UL — SIGNIFICANT CHANGE UP (ref 1.4–6.5)
NEUTROPHILS NFR BLD AUTO: 54.6 % — SIGNIFICANT CHANGE UP (ref 42.2–75.2)
NITRITE UR-MCNC: NEGATIVE — SIGNIFICANT CHANGE UP
NRBC # BLD: 0 /100 WBCS — SIGNIFICANT CHANGE UP (ref 0–0)
OPIATES UR-MCNC: NEGATIVE — SIGNIFICANT CHANGE UP
PCP SPEC-MCNC: SIGNIFICANT CHANGE UP
PH UR: 6 — SIGNIFICANT CHANGE UP (ref 5–8)
PLATELET # BLD AUTO: 171 K/UL — SIGNIFICANT CHANGE UP (ref 130–400)
POTASSIUM SERPL-MCNC: 4.1 MMOL/L — SIGNIFICANT CHANGE UP (ref 3.5–5)
POTASSIUM SERPL-SCNC: 4.1 MMOL/L — SIGNIFICANT CHANGE UP (ref 3.5–5)
PROPOXYPHENE QUALITATIVE URINE RESULT: NEGATIVE — SIGNIFICANT CHANGE UP
PROT SERPL-MCNC: 7.5 G/DL — SIGNIFICANT CHANGE UP (ref 6–8)
PROT UR-MCNC: 30
RBC # BLD: 4.54 M/UL — LOW (ref 4.7–6.1)
RBC # FLD: 13.4 % — SIGNIFICANT CHANGE UP (ref 11.5–14.5)
SALICYLATES SERPL-MCNC: <0.3 MG/DL — LOW (ref 4–30)
SODIUM SERPL-SCNC: 135 MMOL/L — SIGNIFICANT CHANGE UP (ref 135–146)
SP GR SPEC: >=1.03 — SIGNIFICANT CHANGE UP (ref 1.01–1.03)
UROBILINOGEN FLD QL: 0.2 — SIGNIFICANT CHANGE UP (ref 0.2–0.2)
WBC # BLD: 9.69 K/UL — SIGNIFICANT CHANGE UP (ref 4.8–10.8)
WBC # FLD AUTO: 9.69 K/UL — SIGNIFICANT CHANGE UP (ref 4.8–10.8)
WBC UR QL: SIGNIFICANT CHANGE UP /HPF

## 2018-12-13 PROCEDURE — 90792 PSYCH DIAG EVAL W/MED SRVCS: CPT | Mod: GT

## 2018-12-13 RX ORDER — NYSTATIN CREAM 100000 [USP'U]/G
1 CREAM TOPICAL
Qty: 0 | Refills: 0 | Status: DISCONTINUED | OUTPATIENT
Start: 2018-12-13 | End: 2018-12-17

## 2018-12-13 RX ORDER — HYDROXYZINE HCL 10 MG
50 TABLET ORAL AT BEDTIME
Qty: 0 | Refills: 0 | Status: DISCONTINUED | OUTPATIENT
Start: 2018-12-13 | End: 2018-12-17

## 2018-12-13 RX ADMIN — Medication 2 MILLIGRAM(S): at 21:50

## 2018-12-13 RX ADMIN — Medication 50 MILLIGRAM(S): at 21:50
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
## 2018-12-13 NOTE — ED PROVIDER NOTE - MEDICAL DECISION MAKING DETAILS
pt on 1:1, evaluated by psych, transfer to [facility] as discussed with psych, legals done in chart. pt on 1:1, evaluated by psych, transfer to [facility] as discussed with psych, legals done in chart.

## 2018-12-13 NOTE — CHART NOTE - NSCHARTNOTEFT_GEN_A_CORE
Screening Medical Evaluation  Patient Admitted from: [facility] admitting diagnosis: Recurrent major depressive disorder    PAST MEDICAL & SURGICAL HISTORY:  Tinea pedis of left foot  Essential hypertension  Depression  No significant past surgical history        Allergies    fish (Other)  No Known Drug Allergies  Seafood (Unknown)    Intolerances        Social History:     FAMILY HISTORY:  No pertinent family history in first degree relatives      MEDICATIONS  (STANDING):    MEDICATIONS  (PRN):  hydrOXYzine hydrochloride 50 milliGRAM(s) Oral at bedtime PRN insomnia  LORazepam     Tablet 2 milliGRAM(s) Oral every 6 hours PRN Agitation  LORazepam     Tablet 2 milliGRAM(s) Oral every 2 hours PRN CIWA score increase by 2 points and current CIWA score GREATER THAN 9  LORazepam   Injectable 2 milliGRAM(s) IntraMuscular once PRN Agitation      Vital Signs Last 24 Hrs  T(C): 36.7 (13 Dec 2018 17:44), Max: 36.7 (13 Dec 2018 17:44)  T(F): 98.1 (13 Dec 2018 17:44), Max: 98.1 (13 Dec 2018 17:44)  HR: 65 (13 Dec 2018 07:50) (65 - 78)  BP: 119/68 (13 Dec 2018 07:50) (119/68 - 148/87)  BP(mean): --  RR: 20 (13 Dec 2018 17:44) (18 - 20)  SpO2: 97% (13 Dec 2018 17:44) (94% - 97%)  CAPILLARY BLOOD GLUCOSE            PHYSICAL EXAM:  GENERAL: NAD, well-developed  HEAD:  Atraumatic, Normocephalic  EYES: EOMI, PERRLA, conjunctiva and sclera clear  NECK: Supple, No JVD  CHEST/LUNG: Clear to auscultation bilaterally; No wheeze  HEART: Regular rate and rhythm; No murmurs, rubs, or gallops  ABDOMEN: Soft, Nontender, Nondistended; Bowel sounds present  EXTREMITIES:  2+ Peripheral Pulses, No clubbing, cyanosis, or edema  PSYCH: AAOx3  NEUROLOGY: non-focal  SKIN:     LABS:                        13.4   9.69  )-----------( 171      ( 13 Dec 2018 03:41 )             39.4     12-13    135  |  93<L>  |  15  ----------------------------<  104<H>  4.1   |  24  |  0.9    Ca    9.5      13 Dec 2018 03:41    TPro  7.5  /  Alb  4.8  /  TBili  0.4  /  DBili  x   /  AST  47<H>  /  ALT  93<H>  /  AlkPhos  82  12-13          Urinalysis Basic - ( 13 Dec 2018 03:41 )    Color: Dark Yellow / Appearance: Cloudy / SG: >=1.030 / pH: x  Gluc: x / Ketone: Negative  / Bili: Negative / Urobili: 0.2   Blood: x / Protein: 30 / Nitrite: Negative   Leuk Esterase: Negative / RBC: x / WBC 1-2 /HPF   Sq Epi: x / Non Sq Epi: x / Bacteria: Few /HPF        RADIOLOGY & ADDITIONAL TESTS:    Assessment and Plan: 52 yo M with PMH of essential HTN is admitted to [facility] with a primary psychiatric diagnosis of Recurrent major depressive disorder. The pt currently denies having any medical complaints such as chest pain, sob, abdominal pain, n/v/c/d, or any problems with urination or bowel movements. The rest of his screening physical is unremarkable.    1.Recurrent major depressive disorder-Plan: continue with meds as per primary psychiatric team Screening Medical Evaluation  Patient Admitted from: [facility] admitting diagnosis: Recurrent major depressive disorder    PAST MEDICAL & SURGICAL HISTORY:  Tinea pedis of left foot  Essential hypertension  Depression  No significant past surgical history        Allergies    fish (Other)  No Known Drug Allergies  Seafood (Unknown)    Intolerances        Social History:     FAMILY HISTORY:  No pertinent family history in first degree relatives      MEDICATIONS  (STANDING):    MEDICATIONS  (PRN):  hydrOXYzine hydrochloride 50 milliGRAM(s) Oral at bedtime PRN insomnia  LORazepam     Tablet 2 milliGRAM(s) Oral every 6 hours PRN Agitation  LORazepam     Tablet 2 milliGRAM(s) Oral every 2 hours PRN CIWA score increase by 2 points and current CIWA score GREATER THAN 9  LORazepam   Injectable 2 milliGRAM(s) IntraMuscular once PRN Agitation      Vital Signs Last 24 Hrs  T(C): 36.7 (13 Dec 2018 17:44), Max: 36.7 (13 Dec 2018 17:44)  T(F): 98.1 (13 Dec 2018 17:44), Max: 98.1 (13 Dec 2018 17:44)  HR: 65 (13 Dec 2018 07:50) (65 - 78)  BP: 119/68 (13 Dec 2018 07:50) (119/68 - 148/87)  BP(mean): --  RR: 20 (13 Dec 2018 17:44) (18 - 20)  SpO2: 97% (13 Dec 2018 17:44) (94% - 97%)  CAPILLARY BLOOD GLUCOSE            PHYSICAL EXAM:  GENERAL: NAD, well-developed  HEAD:  Atraumatic, Normocephalic  EYES: EOMI, PERRLA, conjunctiva and sclera clear  NECK: Supple, No JVD  CHEST/LUNG: Clear to auscultation bilaterally; No wheeze  HEART: Regular rate and rhythm; No murmurs, rubs, or gallops  ABDOMEN: Soft, Nontender, Nondistended; Bowel sounds present  EXTREMITIES:  2+ Peripheral Pulses, No clubbing, cyanosis, or edema  PSYCH: AAOx3  NEUROLOGY: non-focal  SKIN: fungal rash on L foot    LABS:                        13.4   9.69  )-----------( 171      ( 13 Dec 2018 03:41 )             39.4     12-13    135  |  93<L>  |  15  ----------------------------<  104<H>  4.1   |  24  |  0.9    Ca    9.5      13 Dec 2018 03:41    TPro  7.5  /  Alb  4.8  /  TBili  0.4  /  DBili  x   /  AST  47<H>  /  ALT  93<H>  /  AlkPhos  82  12-13          Urinalysis Basic - ( 13 Dec 2018 03:41 )    Color: Dark Yellow / Appearance: Cloudy / SG: >=1.030 / pH: x  Gluc: x / Ketone: Negative  / Bili: Negative / Urobili: 0.2   Blood: x / Protein: 30 / Nitrite: Negative   Leuk Esterase: Negative / RBC: x / WBC 1-2 /HPF   Sq Epi: x / Non Sq Epi: x / Bacteria: Few /HPF        RADIOLOGY & ADDITIONAL TESTS:    Assessment and Plan: 54 yo M with PMH of essential HTN is admitted to [facility] with a primary psychiatric diagnosis of Recurrent major depressive disorder. The pt currently denies having any medical complaints such as chest pain, sob, abdominal pain, n/v/c/d, or any problems with urination or bowel movements. The rest of his screening physical is unremarkable.    1.Recurrent major depressive disorder-Plan: continue with meds as per primary psychiatric team  2. Fungal rash L foot-Plan: continue with nystatin powder

## 2018-12-13 NOTE — ED BEHAVIORAL HEALTH ASSESSMENT NOTE - LEGAL HISTORY
Per chart, patient was previously in [facility] for 40 days approximately one year ago.

## 2018-12-13 NOTE — ED PROVIDER NOTE - OBJECTIVE STATEMENT
53yM with PMH HTN (not on meds), PPHx of polysubstance abuse (cocaine, cannabis, alcohol) and charted bipolar disorder, multiple prior inpatient psych admissions, prior suicide attempts, self-injurious behavior, p/w SI and depression, requesting to speak to psychiatrist. patient is on seroquel and depakote, but states he has not been taking it for the past week due to inability to procure medications. no HI, AVH. no other medical issues. allergy to fish but nkda. denies drug or etoh this evening.

## 2018-12-13 NOTE — ED BEHAVIORAL HEALTH ASSESSMENT NOTE - SUBSTANCE ISSUES AND PLAN (INCLUDE STANDING AND PRN MEDICATION)
Patient at low risk for acute withdrawal but can keep on CIWA given hx of alcohol abuse/use, will order CIWA q 4 hours, vitals q 4 hours, Symptom-triggered Ativan 2mg q 4 hours for CIWA score over 8. Provide Folic Acid 1mg qd, Multivitamin with minerals qd, Thiamine 1 tablet qd x 3 days.

## 2018-12-13 NOTE — ED BEHAVIORAL HEALTH ASSESSMENT NOTE - SUICIDE RISK FACTORS
Perceived burden on family and others/Unable to engage in safety planning/Hopelessness/Highly impulsive behavior/Substance abuse/dependence/Anhedonia/Mood episode

## 2018-12-13 NOTE — ED ADULT NURSE REASSESSMENT NOTE - NS ED NURSE REASSESS COMMENT FT1
pt assessed, resting comfortably. no signs of distress. vss. 1:1 sit at bedside. legals faxed. will cont to assess and monitor.

## 2018-12-13 NOTE — ED BEHAVIORAL HEALTH ASSESSMENT NOTE - PSYCHIATRIC ISSUES AND PLAN (INCLUDE STANDING AND PRN MEDICATION)
Patient self-discontinued medications - defer restart to primary team, consider restarting depakote and seroquel as in past

## 2018-12-13 NOTE — ED ADULT TRIAGE NOTE - CADM TRG TX PRIOR TO ARRIVAL
none Secondary Intention Text (Leave Blank If You Do Not Want): The defect will heal with secondary intention.

## 2018-12-13 NOTE — ED BEHAVIORAL HEALTH ASSESSMENT NOTE - AXIS IV
Problem related to social environment/Economic problems/Occupational problems/Housing problems/Problems with primary support

## 2018-12-13 NOTE — ED BEHAVIORAL HEALTH ASSESSMENT NOTE - OTHER PAST PSYCHIATRIC HISTORY (INCLUDE DETAILS REGARDING ONSET, COURSE OF ILLNESS, INPATIENT/OUTPATIENT TREATMENT)
History of polysubstance abuse (cocaine, alcohol, cannabis). Multiple prior hospitalizations (had 5 hospitalizations for depression and SI ([facility] 8/24/18-8/30/18, [facility] 8/1/18 -8/10/18, [facility] [place] 9/10/18 - 9/19/18; [facility] 10/2018) Reports one past suicide attempt via overdose within the past year for which he received medical treatment. Has not been adherent with recommended outpatient psychiatric treatment.

## 2018-12-13 NOTE — ED BEHAVIORAL HEALTH ASSESSMENT NOTE - ACTIVATING EVENTS/STRESSORS
Non-compliant with treatment/Recent losses/Recent humiliation/shame/Pending incarceration or homelessness Pending incarceration or homelessness/Non-compliant with treatment
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
## 2018-12-13 NOTE — ED PROVIDER NOTE - ATTENDING CONTRIBUTION TO CARE
53 y.o. M, PMH of HTN, polysubstance abuse (cocaine, cannabis, alcohol) and bipolar disorder with h/o suicide attempt and multiple prior inpatient psych admissions, comes in c/o SI with plan. No HI/AH/VH. No HA, CP/SOB, abdominal pain. On exam, pt in NAD, AAOx3, head NC/AT, CN II-XII intact, lungs CTA B/L, CV S1S2 regular, abdomen soft/NT/ND/(+)BS, ext (-) edema, motor 5/5x4, sensation intact. Will do labs, psyc consult and reevaluate.

## 2018-12-13 NOTE — ED PROVIDER NOTE - SHIFT CHANGE DETAILS
pt [facility] , pending bed at [facility], for SI, pt currently in nad, on 1:1, will continue to monitor and reassess. pt [facility] , pending bed at [facility], for SI, pt currently in nad, on 1:1, will continue to monitor and reassess.
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
## 2018-12-13 NOTE — ED ADULT NURSE NOTE - OBJECTIVE STATEMENT
pt with hx of depression presents to ed with c/o SI. pt states he wants to jump infront of a train. pt is calm at this time. denies HI.

## 2018-12-13 NOTE — ED PROVIDER NOTE - PHYSICAL EXAMINATION
Vital Signs: I have reviewed the initial vital signs.  Constitutional: NAD, well-nourished, appears stated age, no acute distress.  HEENT: Airway patent, moist MM, no erythema/swelling/deformity of oral structures. EOMI, PERRLA.  CV: regular rate, regular rhythm, well-perfused extremities, 2+ b/l DP and radial pulses equal.  Lungs: BCTA, no increased WOB.  ABD: NTND, no guarding or rebound, no pulsatile mass, no hernias.   MSK: Neck supple, nontender, nl ROM, no stepoff. Chest nontender. Back nontender in TLS spine or to b/l bony structures or flanks. Ext nontender, nl rom, no deformity.   INTEG: Skin warm, dry, no rash.  NEURO: A&Ox3, moving all extremities, normal speech  PSYCH: Calm, cooperative, blunted affect, diminished eye contact. +SI no HI/AVH

## 2018-12-14 LAB
CHOLEST SERPL-MCNC: 243 MG/DL — HIGH (ref 120–199)
HBA1C BLD-MCNC: 6.4 % — HIGH (ref 4–5.6)
HDLC SERPL-MCNC: 38 MG/DL — SIGNIFICANT CHANGE UP (ref 35–55)
LIPID PNL WITH DIRECT LDL SERPL: 202 MG/DL — SIGNIFICANT CHANGE UP
TRIGL SERPL-MCNC: 118 MG/DL — SIGNIFICANT CHANGE UP (ref 10–149)

## 2018-12-14 PROCEDURE — 99232 SBSQ HOSP IP/OBS MODERATE 35: CPT

## 2018-12-14 RX ORDER — QUETIAPINE FUMARATE 200 MG/1
100 TABLET, FILM COATED ORAL AT BEDTIME
Qty: 0 | Refills: 0 | Status: DISCONTINUED | OUTPATIENT
Start: 2018-12-14 | End: 2018-12-17

## 2018-12-14 RX ORDER — DIVALPROEX SODIUM 500 MG/1
500 TABLET, DELAYED RELEASE ORAL
Qty: 0 | Refills: 0 | Status: DISCONTINUED | OUTPATIENT
Start: 2018-12-14 | End: 2018-12-17

## 2018-12-14 RX ADMIN — DIVALPROEX SODIUM 500 MILLIGRAM(S): 500 TABLET, DELAYED RELEASE ORAL at 20:52

## 2018-12-14 RX ADMIN — QUETIAPINE FUMARATE 100 MILLIGRAM(S): 200 TABLET, FILM COATED ORAL at 20:52

## 2018-12-14 RX ADMIN — Medication 2 MILLIGRAM(S): at 21:54

## 2018-12-14 RX ADMIN — Medication 50 MILLIGRAM(S): at 21:55

## 2018-12-15 PROCEDURE — 99232 SBSQ HOSP IP/OBS MODERATE 35: CPT

## 2018-12-15 RX ORDER — CLOTRIMAZOLE AND BETAMETHASONE DIPROPIONATE 10; .5 MG/G; MG/G
1 CREAM TOPICAL
Qty: 0 | Refills: 0 | Status: DISCONTINUED | OUTPATIENT
Start: 2018-12-15 | End: 2018-12-17

## 2018-12-15 RX ADMIN — Medication 50 MILLIGRAM(S): at 21:29

## 2018-12-15 RX ADMIN — Medication 2 MILLIGRAM(S): at 21:29

## 2018-12-15 RX ADMIN — QUETIAPINE FUMARATE 100 MILLIGRAM(S): 200 TABLET, FILM COATED ORAL at 20:54

## 2018-12-15 RX ADMIN — DIVALPROEX SODIUM 500 MILLIGRAM(S): 500 TABLET, DELAYED RELEASE ORAL at 20:54

## 2018-12-15 RX ADMIN — DIVALPROEX SODIUM 500 MILLIGRAM(S): 500 TABLET, DELAYED RELEASE ORAL at 12:21

## 2018-12-16 VITALS — TEMPERATURE: 97 F | DIASTOLIC BLOOD PRESSURE: 74 MMHG | HEART RATE: 80 BPM | SYSTOLIC BLOOD PRESSURE: 132 MMHG

## 2018-12-16 PROCEDURE — 99232 SBSQ HOSP IP/OBS MODERATE 35: CPT

## 2018-12-16 RX ORDER — BENZOCAINE AND MENTHOL 5; 1 G/100ML; G/100ML
1 LIQUID ORAL ONCE
Qty: 0 | Refills: 0 | Status: COMPLETED | OUTPATIENT
Start: 2018-12-16 | End: 2018-12-16

## 2018-12-16 RX ADMIN — Medication 2 MILLIGRAM(S): at 22:45

## 2018-12-16 RX ADMIN — DIVALPROEX SODIUM 500 MILLIGRAM(S): 500 TABLET, DELAYED RELEASE ORAL at 09:12

## 2018-12-16 RX ADMIN — DIVALPROEX SODIUM 500 MILLIGRAM(S): 500 TABLET, DELAYED RELEASE ORAL at 20:09

## 2018-12-16 RX ADMIN — CLOTRIMAZOLE AND BETAMETHASONE DIPROPIONATE 1 APPLICATION(S): 10; .5 CREAM TOPICAL at 09:13

## 2018-12-16 RX ADMIN — QUETIAPINE FUMARATE 100 MILLIGRAM(S): 200 TABLET, FILM COATED ORAL at 20:09

## 2018-12-16 RX ADMIN — BENZOCAINE AND MENTHOL 1 LOZENGE: 5; 1 LIQUID ORAL at 23:55

## 2018-12-16 RX ADMIN — Medication 50 MILLIGRAM(S): at 22:45

## 2018-12-17 PROCEDURE — 99239 HOSP IP/OBS DSCHRG MGMT >30: CPT

## 2018-12-17 RX ORDER — QUETIAPINE FUMARATE 200 MG/1
1 TABLET, FILM COATED ORAL
Qty: 15 | Refills: 0 | OUTPATIENT
Start: 2018-12-17 | End: 2018-12-31

## 2018-12-17 RX ORDER — DIVALPROEX SODIUM 500 MG/1
1 TABLET, DELAYED RELEASE ORAL
Qty: 30 | Refills: 0 | OUTPATIENT
Start: 2018-12-17 | End: 2018-12-31

## 2018-12-17 RX ADMIN — DIVALPROEX SODIUM 500 MILLIGRAM(S): 500 TABLET, DELAYED RELEASE ORAL at 08:46

## 2019-01-16 ENCOUNTER — INPATIENT (INPATIENT)
Facility: HOSPITAL | Age: 54
LOS: 6 days | Discharge: HOME | End: 2019-01-23
Attending: PSYCHIATRY & NEUROLOGY | Admitting: PSYCHIATRY & NEUROLOGY
Payer: MEDICAID

## 2019-01-16 VITALS
SYSTOLIC BLOOD PRESSURE: 163 MMHG | OXYGEN SATURATION: 97 % | HEART RATE: 73 BPM | DIASTOLIC BLOOD PRESSURE: 107 MMHG | RESPIRATION RATE: 16 BRPM | TEMPERATURE: 98 F

## 2019-01-16 DIAGNOSIS — F29 UNSPECIFIED PSYCHOSIS NOT DUE TO A SUBSTANCE OR KNOWN PHYSIOLOGICAL CONDITION: ICD-10-CM

## 2019-01-16 DIAGNOSIS — F14.20 COCAINE DEPENDENCE, UNCOMPLICATED: ICD-10-CM

## 2019-01-16 DIAGNOSIS — F10.20 ALCOHOL DEPENDENCE, UNCOMPLICATED: ICD-10-CM

## 2019-01-16 DIAGNOSIS — F39 UNSPECIFIED MOOD [AFFECTIVE] DISORDER: ICD-10-CM

## 2019-01-16 DIAGNOSIS — F60.2 ANTISOCIAL PERSONALITY DISORDER: ICD-10-CM

## 2019-01-16 DIAGNOSIS — F12.20 CANNABIS DEPENDENCE, UNCOMPLICATED: ICD-10-CM

## 2019-01-16 LAB
ALBUMIN SERPL ELPH-MCNC: 4.5 G/DL — SIGNIFICANT CHANGE UP (ref 3.3–5)
ALP SERPL-CCNC: 79 U/L — SIGNIFICANT CHANGE UP (ref 40–120)
ALT FLD-CCNC: 78 U/L — HIGH (ref 4–41)
AMPHET UR-MCNC: NEGATIVE — SIGNIFICANT CHANGE UP
ANION GAP SERPL CALC-SCNC: 13 MEQ/L — SIGNIFICANT CHANGE UP (ref 7–14)
APAP SERPL-MCNC: < 15 UG/ML — LOW (ref 15–25)
AST SERPL-CCNC: 43 U/L — HIGH (ref 4–40)
BARBITURATES UR SCN-MCNC: NEGATIVE — SIGNIFICANT CHANGE UP
BASOPHILS # BLD AUTO: 0.06 K/UL — SIGNIFICANT CHANGE UP (ref 0–0.2)
BASOPHILS NFR BLD AUTO: 0.5 % — SIGNIFICANT CHANGE UP (ref 0–2)
BENZODIAZ UR-MCNC: NEGATIVE — SIGNIFICANT CHANGE UP
BILIRUB SERPL-MCNC: 0.3 MG/DL — SIGNIFICANT CHANGE UP (ref 0.2–1.2)
BUN SERPL-MCNC: 10 MG/DL — SIGNIFICANT CHANGE UP (ref 7–23)
CALCIUM SERPL-MCNC: 9.5 MG/DL — SIGNIFICANT CHANGE UP (ref 8.4–10.5)
CANNABINOIDS UR-MCNC: POSITIVE — SIGNIFICANT CHANGE UP
CHLORIDE SERPL-SCNC: 97 MMOL/L — LOW (ref 98–107)
CO2 SERPL-SCNC: 27 MMOL/L — SIGNIFICANT CHANGE UP (ref 22–31)
COCAINE METAB.OTHER UR-MCNC: POSITIVE — SIGNIFICANT CHANGE UP
CREAT SERPL-MCNC: 0.79 MG/DL — SIGNIFICANT CHANGE UP (ref 0.5–1.3)
EOSINOPHIL # BLD AUTO: 0.14 K/UL — SIGNIFICANT CHANGE UP (ref 0–0.5)
EOSINOPHIL NFR BLD AUTO: 1.1 % — SIGNIFICANT CHANGE UP (ref 0–6)
ETHANOL BLD-MCNC: < 10 MG/DL — SIGNIFICANT CHANGE UP
GLUCOSE SERPL-MCNC: 101 MG/DL — HIGH (ref 70–99)
HCT VFR BLD CALC: 39.8 % — SIGNIFICANT CHANGE UP (ref 39–50)
HGB BLD-MCNC: 12.9 G/DL — LOW (ref 13–17)
IMM GRANULOCYTES NFR BLD AUTO: 0.4 % — SIGNIFICANT CHANGE UP (ref 0–1.5)
LYMPHOCYTES # BLD AUTO: 2.88 K/UL — SIGNIFICANT CHANGE UP (ref 1–3.3)
LYMPHOCYTES # BLD AUTO: 22.6 % — SIGNIFICANT CHANGE UP (ref 13–44)
MCHC RBC-ENTMCNC: 28.9 PG — SIGNIFICANT CHANGE UP (ref 27–34)
MCHC RBC-ENTMCNC: 32.4 % — SIGNIFICANT CHANGE UP (ref 32–36)
MCV RBC AUTO: 89 FL — SIGNIFICANT CHANGE UP (ref 80–100)
METHADONE UR-MCNC: NEGATIVE — SIGNIFICANT CHANGE UP
MONOCYTES # BLD AUTO: 0.99 K/UL — HIGH (ref 0–0.9)
MONOCYTES NFR BLD AUTO: 7.8 % — SIGNIFICANT CHANGE UP (ref 2–14)
NEUTROPHILS # BLD AUTO: 8.61 K/UL — HIGH (ref 1.8–7.4)
NEUTROPHILS NFR BLD AUTO: 67.6 % — SIGNIFICANT CHANGE UP (ref 43–77)
NRBC # FLD: 0 K/UL — LOW (ref 25–125)
OPIATES UR-MCNC: NEGATIVE — SIGNIFICANT CHANGE UP
OXYCODONE UR-MCNC: NEGATIVE — SIGNIFICANT CHANGE UP
PCP UR-MCNC: NEGATIVE — SIGNIFICANT CHANGE UP
PLATELET # BLD AUTO: 187 K/UL — SIGNIFICANT CHANGE UP (ref 150–400)
PMV BLD: 12.7 FL — SIGNIFICANT CHANGE UP (ref 7–13)
POTASSIUM SERPL-MCNC: 4.5 MMOL/L — SIGNIFICANT CHANGE UP (ref 3.5–5.3)
POTASSIUM SERPL-SCNC: 4.5 MMOL/L — SIGNIFICANT CHANGE UP (ref 3.5–5.3)
PROT SERPL-MCNC: 7.6 G/DL — SIGNIFICANT CHANGE UP (ref 6–8.3)
RBC # BLD: 4.47 M/UL — SIGNIFICANT CHANGE UP (ref 4.2–5.8)
RBC # FLD: 13.6 % — SIGNIFICANT CHANGE UP (ref 10.3–14.5)
SALICYLATES SERPL-MCNC: < 5 MG/DL — LOW (ref 15–30)
SODIUM SERPL-SCNC: 137 MMOL/L — SIGNIFICANT CHANGE UP (ref 135–145)
TSH SERPL-MCNC: 3.16 UIU/ML — SIGNIFICANT CHANGE UP (ref 0.27–4.2)
WBC # BLD: 12.73 K/UL — HIGH (ref 3.8–10.5)
WBC # FLD AUTO: 12.73 K/UL — HIGH (ref 3.8–10.5)

## 2019-01-16 PROCEDURE — 99285 EMERGENCY DEPT VISIT HI MDM: CPT

## 2019-01-16 RX ORDER — THIAMINE MONONITRATE (VIT B1) 100 MG
100 TABLET ORAL DAILY
Qty: 0 | Refills: 0 | Status: COMPLETED | OUTPATIENT
Start: 2019-01-16 | End: 2019-01-18

## 2019-01-16 RX ORDER — FOLIC ACID 0.8 MG
1 TABLET ORAL DAILY
Qty: 0 | Refills: 0 | Status: COMPLETED | OUTPATIENT
Start: 2019-01-16 | End: 2019-01-22

## 2019-01-16 RX ORDER — DIPHENHYDRAMINE HCL 50 MG
50 CAPSULE ORAL ONCE
Qty: 0 | Refills: 0 | Status: DISCONTINUED | OUTPATIENT
Start: 2019-01-16 | End: 2019-01-23

## 2019-01-16 RX ORDER — HALOPERIDOL DECANOATE 100 MG/ML
5 INJECTION INTRAMUSCULAR EVERY 6 HOURS
Qty: 0 | Refills: 0 | Status: DISCONTINUED | OUTPATIENT
Start: 2019-01-16 | End: 2019-01-23

## 2019-01-16 RX ORDER — QUETIAPINE FUMARATE 200 MG/1
200 TABLET, FILM COATED ORAL AT BEDTIME
Qty: 0 | Refills: 0 | Status: DISCONTINUED | OUTPATIENT
Start: 2019-01-16 | End: 2019-01-23

## 2019-01-16 RX ORDER — HYDROXYZINE HCL 10 MG
50 TABLET ORAL EVERY 6 HOURS
Qty: 0 | Refills: 0 | Status: DISCONTINUED | OUTPATIENT
Start: 2019-01-16 | End: 2019-01-23

## 2019-01-16 RX ORDER — HALOPERIDOL DECANOATE 100 MG/ML
5 INJECTION INTRAMUSCULAR ONCE
Qty: 0 | Refills: 0 | Status: DISCONTINUED | OUTPATIENT
Start: 2019-01-16 | End: 2019-01-23

## 2019-01-16 RX ORDER — DIPHENHYDRAMINE HCL 50 MG
50 CAPSULE ORAL EVERY 6 HOURS
Qty: 0 | Refills: 0 | Status: DISCONTINUED | OUTPATIENT
Start: 2019-01-16 | End: 2019-01-23

## 2019-01-16 RX ADMIN — Medication 1 MILLIGRAM(S): at 22:11

## 2019-01-16 RX ADMIN — Medication 1 TABLET(S): at 22:11

## 2019-01-16 RX ADMIN — Medication 100 MILLIGRAM(S): at 22:11

## 2019-01-16 RX ADMIN — QUETIAPINE FUMARATE 200 MILLIGRAM(S): 200 TABLET, FILM COATED ORAL at 22:11

## 2019-01-16 NOTE — ED BEHAVIORAL HEALTH ASSESSMENT NOTE - OTHER PAST PSYCHIATRIC HISTORY (INCLUDE DETAILS REGARDING ONSET, COURSE OF ILLNESS, INPATIENT/OUTPATIENT TREATMENT)
History of polysubstance abuse (cocaine, alcohol, cannabis). Multiple prior hospitalizations (had 6 hospitalizations for depression and SI ([facility] 8/24/18-8/30/18, [facility] 8/1/18 -8/10/18, [facility] 9/10/18 - 9/19/18; [facility] 10/10/2018-10/18/2018; [facility] 12/13/2018-12/17/2018). Reports one past suicide attempt via overdose within the past year for which he received medical treatment. Has not been adherent with recommended outpatient psychiatric treatment.

## 2019-01-16 NOTE — ED BEHAVIORAL HEALTH ASSESSMENT NOTE - RISK ASSESSMENT
Patient is at elevated acute risk of suicide due to active suicidal ideation/intent/plan, acute mood episode, and ongoing substance abuse. His chronic risk factors include history of mood episodes, prior psychiatric hospitalizations, history of self-injurious behavior, reported history of suicide attempt, history of abuse/trauma, and history of chronic relapsing substance abuse.

## 2019-01-16 NOTE — ED BEHAVIORAL HEALTH ASSESSMENT NOTE - SUBSTANCE ISSUES AND PLAN (INCLUDE STANDING AND PRN MEDICATION)
Patient at low risk for acute withdrawal but will order CIWA q 4 hours, vitals q 4 hours, Symptom-triggered Ativan 2mg q 4 hours for CIWA score over 8. Provide Folic Acid 1mg qd, Multivitamin with minerals qd, Thiamine 1 tablet qd x 3 days.
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
## 2019-01-16 NOTE — ED PROVIDER NOTE - OBJECTIVE STATEMENT
53 PMH of depression, polysubstance abuse (marijuana, cocaine, heroine) and HTN (not on meds) p/w c/o suicidal ideation and depression worsening x1 week. Pt plans to jump in front of a train. No HI/AVH. 1 PPD smoker. Occasional alcohol use, denies binge drinking. Denies illicit drug use within past week. Treated with Seroquel + Depakote for psych hx but ran out of medication 1 week ago. No HA, CP, SOB, Abd pain, fever.

## 2019-01-16 NOTE — ED BEHAVIORAL HEALTH ASSESSMENT NOTE - SUMMARY
Patient is a 53 year-old man, undomiciled, unemployed, PMHx of HTN (not on meds), PPHx of polysubstance dependence (cocaine, cannabis, alcohol) and charted bipolar disorder, multiple prior hospitalizations (had 6 hospitalizations for depression and SI since June 2018 - [facility] 7/2018, 8/2018, 9/2018, 10/2018, 12/2018), self-reported SA last year by overdose and one other hx of cutting wrists, no reported or charted history of violence or aggression, history of incarceration at [facility] for kaylin last year (40 days), ongoing cocaine, cannabis, and alcohol use (denies chronic daily alcohol; no known history of complicated withdrawal or seizures), who presents to the ED as a walk-in reporting suicidal plan to jump in front of a train in the context of worsening depression, treatment non-adherence, and psychosocial stressors. Patient has had multiple ER visits/hospitalizations with similar presentations. Patient states he is depressed and wants to kill himself by jumping in front of a train. Denies any acute stressor, however stating that he is tired of living like this and would like longterm treatment. Patient at low risk for acute withdrawal: he denies daily alcohol use, and there is no evidence on exam of acute withdrawal. He is voluntarily requesting admission for safety and stabilization.

## 2019-01-16 NOTE — ED BEHAVIORAL HEALTH ASSESSMENT NOTE - CURRENT MEDICATION
Previously on Depakote 500mg PO BID and Seroquel 100mg PO qHS, but has not taken the medication in several weeks

## 2019-01-16 NOTE — ED ADULT NURSE NOTE - OBJECTIVE STATEMENT
53yom a&ox4 amb received to ED  for SI. pt hx depression w/ multiple inpatient stays. pt c/o increased depression w/ SI and plan to jump in front of a train. pt reports cocaine and etoh use approx 2 days ago. smokes 1 PPD. pt requesting inpatient hospitalization. pt denies medical complaints. pt currently calm, cooperative. breathing even/unlabored. labs sent. pending psych recs. will continue to monitor.

## 2019-01-16 NOTE — ED ADULT TRIAGE NOTE - CHIEF COMPLAINT QUOTE
pt states "im suicidal and depressed." noncompliant with depakote and seroquel. hx bipolar, depression , axniety. denies hi, etoh or drug use. states he has a plan to jump in front of a train

## 2019-01-16 NOTE — ED ADULT NURSE NOTE - NSIMPLEMENTINTERV_GEN_ALL_ED
Implemented All Universal Safety Interventions:  [place] to call system. Call bell, personal items and telephone within reach. Instruct patient to call for assistance. Room bathroom lighting operational. Non-slip footwear when patient is off stretcher. Physically safe environment: no spills, clutter or unnecessary equipment. Stretcher in lowest position, wheels locked, appropriate side rails in place.

## 2019-01-16 NOTE — ED BEHAVIORAL HEALTH ASSESSMENT NOTE - PSYCHIATRIC ISSUES AND PLAN (INCLUDE STANDING AND PRN MEDICATION)
Patient self discontinued medication reporting he does not think Seroquel and Depakote are beneficial. Will defer medication decision to primary team
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
## 2019-01-16 NOTE — ED BEHAVIORAL HEALTH ASSESSMENT NOTE - LEGAL HISTORY
Per chart, patient was previously in [facility] for 40 days approximately one year ago.

## 2019-01-16 NOTE — ED BEHAVIORAL HEALTH ASSESSMENT NOTE - HPI (INCLUDE ILLNESS QUALITY, SEVERITY, DURATION, TIMING, CONTEXT, MODIFYING FACTORS, ASSOCIATED SIGNS AND SYMPTOMS)
Patient is a 53 year-old man, undomiciled, unemployed, PMHx of HTN (not on meds), PPHx of polysubstance use (cocaine, cannabis, alcohol) and charted bipolar disorder, multiple prior hospitalizations (had 6 hospitalizations for depression and SI since June 2018 - [facility] 7/2018, 8/2018, 9/2018, 10/2018, 12/2018), self-reported SA last year by overdose and one other hx of cutting wrists, no reported or charted history of violence or aggression, history of incarceration at [facility] for kaylin last year (40 days), ongoing cocaine, cannabis, and alcohol use (denies chronic daily alcohol; no known history of complicated withdrawal or seizures), who self presents to the ED reporting suicidal plan to jump in front of a train in the context of worsening depression, treatment non-adherence, and psychosocial stressors. Patient has had multiple visits to the emergency room with similar presentations. Patient reports feeling depressed and wanting to kill himself by jumping in front of a train. He denies any acute stressor, however stating that he is tired of living like this and would like long-term treatment. He reports ongoing low mood and states his suicidal thoughts started yesterday. He admits to non-adherence with medications and states he does not like the Depakote and Seroquel because he does not find them beneficial. He stopped taking his medications a couple weeks ago and did not follow up with psychiatric treatment following his recent discharge from [facility]. When asked why he did not continue with outpatient psychiatric follow-up, replies "I don't know." He states he wants this admission to be different and acknowledge that he did not take prior admissions seriously. He states this is a new year and he has to do something different. He would like to look into [place] for substance use treatment and housing.     The patient reports he last used substances 1-2 days ago. He drank alcohol--a "couple shots and some beers". He smoked marijuana "about a bag" and used $125 of cocaine. He reports intermittent substance use (possibly weekly) and denies chronic daily use. He denies history of withdrawal or seizures from alcohol.     No collateral available

## 2019-01-16 NOTE — ED PROVIDER NOTE - MEDICAL DECISION MAKING DETAILS
Psych c/s for SI/depression. No physical complaints. +HTN, however no end-organ complaints. Psych c/s for SI/depression. No physical complaints. BP elevated in triage but downtrending and no end-organ complaints.

## 2019-01-16 NOTE — ED BEHAVIORAL HEALTH ASSESSMENT NOTE - DESCRIPTION (FIRST USE, LAST USE, QUANTITY, FREQUENCY, DURATION)
Reports smoking 1PPD Ongoing intermittent excessive use; denies chronic daily use. Reports last drink 1-2 days ago. hx of use, last used 1-2 days ago History of abuse; reports last use 1-2 days ago

## 2019-01-16 NOTE — ED BEHAVIORAL HEALTH ASSESSMENT NOTE - SUICIDE RISK FACTORS
Unable to engage in safety planning/Mood episode/Hopelessness/Highly impulsive behavior/Substance abuse/dependence

## 2019-01-17 PROCEDURE — 99232 SBSQ HOSP IP/OBS MODERATE 35: CPT

## 2019-01-17 RX ADMIN — Medication 100 MILLIGRAM(S): at 10:48

## 2019-01-17 RX ADMIN — Medication 1 TABLET(S): at 10:48

## 2019-01-17 RX ADMIN — QUETIAPINE FUMARATE 200 MILLIGRAM(S): 200 TABLET, FILM COATED ORAL at 23:04

## 2019-01-17 RX ADMIN — Medication 1 MILLIGRAM(S): at 10:48

## 2019-01-18 PROCEDURE — 99232 SBSQ HOSP IP/OBS MODERATE 35: CPT

## 2019-01-18 RX ORDER — TUBERCULIN PURIFIED PROTEIN DERIVATIVE 5 [IU]/.1ML
5 INJECTION, SOLUTION INTRADERMAL ONCE
Qty: 0 | Refills: 0 | Status: COMPLETED | OUTPATIENT
Start: 2019-01-18 | End: 2019-01-18

## 2019-01-18 RX ADMIN — Medication 100 MILLIGRAM(S): at 08:48

## 2019-01-18 RX ADMIN — QUETIAPINE FUMARATE 200 MILLIGRAM(S): 200 TABLET, FILM COATED ORAL at 23:22

## 2019-01-18 RX ADMIN — Medication 1 MILLIGRAM(S): at 08:48

## 2019-01-18 RX ADMIN — Medication 1 TABLET(S): at 08:48

## 2019-01-18 RX ADMIN — TUBERCULIN PURIFIED PROTEIN DERIVATIVE 5 UNIT(S): 5 INJECTION, SOLUTION INTRADERMAL at 13:56

## 2019-01-19 PROCEDURE — 99232 SBSQ HOSP IP/OBS MODERATE 35: CPT

## 2019-01-19 RX ADMIN — QUETIAPINE FUMARATE 200 MILLIGRAM(S): 200 TABLET, FILM COATED ORAL at 22:38

## 2019-01-19 RX ADMIN — Medication 1 TABLET(S): at 09:04

## 2019-01-19 RX ADMIN — Medication 1 MILLIGRAM(S): at 09:04

## 2019-01-20 PROCEDURE — 99231 SBSQ HOSP IP/OBS SF/LOW 25: CPT

## 2019-01-20 RX ORDER — BACITRACIN ZINC 500 UNIT/G
1 OINTMENT IN PACKET (EA) TOPICAL ONCE
Qty: 0 | Refills: 0 | Status: DISCONTINUED | OUTPATIENT
Start: 2019-01-20 | End: 2019-01-23

## 2019-01-20 RX ADMIN — Medication 1 TABLET(S): at 09:41

## 2019-01-20 RX ADMIN — QUETIAPINE FUMARATE 200 MILLIGRAM(S): 200 TABLET, FILM COATED ORAL at 22:22

## 2019-01-20 RX ADMIN — Medication 1 MILLIGRAM(S): at 09:41

## 2019-01-21 VITALS — SYSTOLIC BLOOD PRESSURE: 116 MMHG | TEMPERATURE: 97 F | HEART RATE: 81 BPM | DIASTOLIC BLOOD PRESSURE: 65 MMHG

## 2019-01-21 PROCEDURE — 99232 SBSQ HOSP IP/OBS MODERATE 35: CPT

## 2019-01-21 RX ADMIN — QUETIAPINE FUMARATE 200 MILLIGRAM(S): 200 TABLET, FILM COATED ORAL at 22:41

## 2019-01-21 RX ADMIN — Medication 1 MILLIGRAM(S): at 09:16

## 2019-01-21 RX ADMIN — Medication 1 APPLICATION(S): at 22:41

## 2019-01-21 RX ADMIN — Medication 1 TABLET(S): at 09:16

## 2019-01-22 PROCEDURE — 99232 SBSQ HOSP IP/OBS MODERATE 35: CPT

## 2019-01-22 RX ADMIN — Medication 1 TABLET(S): at 12:34

## 2019-01-22 RX ADMIN — QUETIAPINE FUMARATE 200 MILLIGRAM(S): 200 TABLET, FILM COATED ORAL at 22:17

## 2019-01-22 RX ADMIN — Medication 1 MILLIGRAM(S): at 12:34

## 2019-01-23 PROCEDURE — 99238 HOSP IP/OBS DSCHRG MGMT 30/<: CPT

## 2019-01-23 RX ORDER — QUETIAPINE FUMARATE 200 MG/1
1 TABLET, FILM COATED ORAL
Qty: 15 | Refills: 0 | OUTPATIENT
Start: 2019-01-23 | End: 2019-02-06

## 2019-01-23 RX ADMIN — Medication 1 APPLICATION(S): at 09:22

## 2019-01-23 RX ADMIN — Medication 1 APPLICATION(S): at 00:15

## 2019-01-24 ENCOUNTER — EMERGENCY (EMERGENCY)
Facility: HOSPITAL | Age: 54
LOS: 0 days | Discharge: HOME | End: 2019-01-24
Attending: EMERGENCY MEDICINE | Admitting: EMERGENCY MEDICINE

## 2019-01-24 VITALS
TEMPERATURE: 98 F | SYSTOLIC BLOOD PRESSURE: 137 MMHG | RESPIRATION RATE: 18 BRPM | HEART RATE: 79 BPM | DIASTOLIC BLOOD PRESSURE: 75 MMHG | OXYGEN SATURATION: 98 %

## 2019-01-24 VITALS — WEIGHT: 240.08 LBS | HEIGHT: 69 IN

## 2019-01-24 DIAGNOSIS — F32.9 MAJOR DEPRESSIVE DISORDER, SINGLE EPISODE, UNSPECIFIED: ICD-10-CM

## 2019-01-24 DIAGNOSIS — Z91.013 ALLERGY TO SEAFOOD: ICD-10-CM

## 2019-01-24 DIAGNOSIS — Z79.899 OTHER LONG TERM (CURRENT) DRUG THERAPY: ICD-10-CM

## 2019-01-24 DIAGNOSIS — F19.94 OTHER PSYCHOACTIVE SUBSTANCE USE, UNSPECIFIED WITH PSYCHOACTIVE SUBSTANCE-INDUCED MOOD DISORDER: ICD-10-CM

## 2019-01-24 DIAGNOSIS — R45.851 SUICIDAL IDEATIONS: ICD-10-CM

## 2019-01-24 DIAGNOSIS — I10 ESSENTIAL (PRIMARY) HYPERTENSION: ICD-10-CM

## 2019-01-24 RX ORDER — DIVALPROEX SODIUM 500 MG/1
0 TABLET, DELAYED RELEASE ORAL
Qty: 0 | Refills: 0 | COMMUNITY

## 2019-01-24 NOTE — ED BEHAVIORAL HEALTH ASSESSMENT NOTE - OTHER PAST PSYCHIATRIC HISTORY (INCLUDE DETAILS REGARDING ONSET, COURSE OF ILLNESS, INPATIENT/OUTPATIENT TREATMENT)
Reports first IPP admission approx. 2-3 years ago, with 5-6 IPP admits since then. Reports last admit [facility] 12/30-12/17/18. Pt presented to [facility] ED on 1/16/19 reported same symptoms and was offered voluntary admission but then eloped. He reports he follows up OP with Dr. Eric in [place].

## 2019-01-24 NOTE — ED PROVIDER NOTE - NS ED ROS FT
Review of Systems:  	•	CONSTITUTIONAL: no fever, no diaphoresis, no chills  	•	SKIN: no rash  	•	HEMATOLOGIC: no bleeding, no bruising  	•	EYES: no eye pain, no blurry vision  	•	ENT: no change in hearing, no sore throat, no ear pain or tinnitus  	•	RESPIRATORY: no shortness of breath, no cough  	•	CARDIAC: no chest pain, no palpitations  	•	GI: no abd pain, no nausea, no vomiting, no diarrhea, no constipation  	•	GENITO-URINARY: no discharge, no dysuria; no hematuria, no increased urinary frequency  	•	MUSCULOSKELETAL: no joint paint, no swelling, no redness  	•	NEUROLOGIC: no weakness, no headache, no paresthesias, no LOC  	•	PSYCH: no anxiety, + suicidal, non homicidal, no hallucination, + depression

## 2019-01-24 NOTE — ED PROVIDER NOTE - MEDICAL DECISION MAKING DETAILS
Pt was cooperative during ed stay and contracts for safety.  Psych eval and rec d/c with outpt f/u.  Pt is aware and agrees.  Patient was discharged from the ED. Verbal instructions were given, including instructions to return to ED immediately for any new, worsening, or concerning symptoms. Patient endorsed understanding. Written discharge instructions additionally given, including follow-up plan.
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
## 2019-01-24 NOTE — ED PROVIDER NOTE - NSFOLLOWUPCLINICS_GEN_ALL_ED_FT
[facility]  OP Mental Health  [address] [place] [id number]  Phone: [phone]  Fax:   Follow Up Time:

## 2019-01-24 NOTE — ED ADULT NURSE NOTE - NSIMPLEMENTINTERV_GEN_ALL_ED
Implemented All Universal Safety Interventions:  [place] to call system. Call bell, personal items and telephone within reach. Instruct patient to call for assistance. Room bathroom lighting operational. Non-slip footwear when patient is off stretcher. Physically safe environment: no spills, clutter or unnecessary equipment. Stretcher in lowest position, wheels locked, appropriate side rails in place.

## 2019-01-24 NOTE — ED PROVIDER NOTE - ATTENDING CONTRIBUTION TO CARE
Pt with c/o depression.  States had similar sx previously, but today he doesn't feel as bad as he had previously.  He was cooperative in the ed.  L:CTAB, CV:rrr, s1s2, Psych eval pt in the ed and cleared for outpt f/u.

## 2019-01-24 NOTE — ED ADULT NURSE NOTE - HPI (INCLUDE ILLNESS QUALITY, SEVERITY, DURATION, TIMING, CONTEXT, MODIFYING FACTORS, ASSOCIATED SIGNS AND SYMPTOMS)
patient reports suicidal thoughts for two days. per patient plan was to jump in front of a train and has had previous history of suicide.

## 2019-01-24 NOTE — ED BEHAVIORAL HEALTH ASSESSMENT NOTE - SUMMARY
52 yo male, undomiciled and unemployed,  from wife "for a few years," with prior chart hx of depression, followed OP by Dr. Eric in [place], with 6 prior IPP admissions (last this December), one reported SA via OD (told wife right after attempt), with current cocaine, MJ and alcohol used h/o, w/ hx of incarceration for drug possession and larceny, presenting to ED reporting SI and depression, with hx of multiple ED visits for similar presentation, with psych consult to assess suicidality. Pt presents endorsing depressed mood and desire to jump in front of train in context of recent substance use, however currently he appears euthymic with linear thought process, without notable thought content, perceptual abnormalities, or disorganization. Pt presents with inconsistent history, reports he was able to travel from [place] to  via train despite suicidality, and recently presented to [facility] ED with similar symptoms and later eloped. Suspicion high for malingering vs mood d/o 2/2 to substance use. Pt offered CDRU referral and shelter referral, but refused at this time. No objective signs of withdrawal observed. At this time, pt not considered acute danger to self or others and does not qualify for emergency IPP admission.

## 2019-01-24 NOTE — ED BEHAVIORAL HEALTH ASSESSMENT NOTE - DESCRIPTION (FIRST USE, LAST USE, QUANTITY, FREQUENCY, DURATION)
1-2 beers every other day. Reports previous use of 12 beers a few months ago. Reports 3 detox/rehab admits. Last "a few months ago." one blunt daily $100 a couple of days a week Reports  substance use began age 12, denies significant sober period

## 2019-01-24 NOTE — ED BEHAVIORAL HEALTH ASSESSMENT NOTE - RISK ASSESSMENT
pt considered chronically high risk 2/2 to substance use. Pt refused rehab at this time. Pt's risk factors of med non compliance, and suicidal ideation can be mitigated by willingness to come to ED, lack of suicidal intent, future orientation, lack of means, and help seeking behaviour.

## 2019-01-24 NOTE — ED ADULT TRIAGE NOTE - NS_BH TRG Q4B_ED_A_ED
"Subjective:      Hugh Bates is a 62 y.o. male who was referred by Dr Santana for evaluation of elevated PSA    The patient was treated for urinary tract infection last month.  PSA at this time was elevated.  His symptoms have completely resolved currently he has no  complaints.  No history of prostate issues.  He tells me he may have had a kidney stone many years ago.   No recurrences of stones.  No flank pain again no complaints.  No family history of prostate cancer          The following portions of the patient's history were reviewed and updated as appropriate: allergies, current medications, past family history, past medical history, past social history, past surgical history and problem list.    Review of Systems  Constitutional: no fever or chills  ENT: no nasal congestion or sore throat  Respiratory: no cough or shortness of breath  Cardiovascular: no chest pain or palpitations  Gastrointestinal: no nausea or vomiting, tolerating diet  Genitourinary: as per HPI  Hematologic/Lymphatic: no easy bruising or lymphadenopathy  Musculoskeletal: no arthralgias or myalgias  Neurological: no seizures or tremors  Behavioral/Psych: no auditory or visual hallucinations     Objective:   Vitals: /76 (BP Location: Left arm, Patient Position: Sitting, BP Method: Large (Automatic))   Pulse (!) 51   Ht 5' 10" (1.778 m)   Wt 69.4 kg (153 lb)   BMI 21.95 kg/m²     Physical Exam   General: alert and oriented, no acute distress  Head: normocephalic, atraumatic  Neck: normal ROM  Respiratory: Symmetric expansion, non-labored breathing  Cardiovascular: no peripheral edema  Abdomen:  Nondistended  Genitourinary:   Prostate: normal for age, normal consistency, non tender, no specific nodules, 20 g; seminal vesicles not palpated  Rectum: normal rectal tone, no rectal mass, normal perineum  Skin: normal coloration and turgor, no rashes, no suspicious skin lesions noted  Neuro: alert and oriented x3, no gross " deficits  Psych: normal judgment and insight, normal mood/affect and non-anxious    Physical Exam    Lab Review   Urinalysis demonstrates negative for all components  Lab Results   Component Value Date    WBC 3.22 (L) 12/13/2018    HGB 13.6 (L) 12/13/2018    HCT 41.6 12/13/2018    MCV 94 12/13/2018     12/13/2018     Lab Results   Component Value Date    CREATININE 1.1 12/18/2018    BUN 21 12/18/2018     Lab Results   Component Value Date    PSA 6.5 (H) 12/18/2018     Imaging  -  Assessment:     1. Elevated PSA    2. Urinary tract infection without hematuria, site unspecified      UTI resolved.  Elevated PSA is likely related to recent UTI  Plan:     Orders Placed This Encounter    US Kidney    Prostate Specific Antigen, Diagnostic    POCT URINE DIPSTICK WITHOUT MICROSCOPE     Return to clinic 3 months with above.  If UTI recurs we will schedule cystoscopy     Past 24 hrs

## 2019-01-24 NOTE — ED BEHAVIORAL HEALTH ASSESSMENT NOTE - CURRENT MEDICATION
Depakote 500mg PO q 12h, Seroquel 200mg PO qHS. Originally reported last use 2 days ago and said recently ran out of medication. Later pt said he has not taken it "In a while."

## 2019-01-24 NOTE — ED BEHAVIORAL HEALTH ASSESSMENT NOTE - SUICIDE PROTECTIVE FACTORS
Positive therapeutic relationships/Ability to cope with stress/Future oriented/High frustration tolerance

## 2019-01-24 NOTE — ED BEHAVIORAL HEALTH ASSESSMENT NOTE - CASE SUMMARY
53 year old man who is homeless and unemployed with past diagnosis of Major Depressive Disorder and documented history of polysubstance use including cocaine, cannabis, and alcohol who presented to the ER requesting admission for suicidal ideation.  The patient reported symptoms that were not consistent with signs on examination.  He did not appear depressed, he did not appear psychotic.  He has a h/o searching for admission while presenting with inconsistencies.  At this time, he was observed in the ED to be of neutral affect and in behavioral control.  He was not noted to be in need of detox admission but was offered admission to inpatient rehabilitation for treatment of substance abuse with psychiatric consult for his psychiatric complaints.  He retracted his suicidal ideation and requested discharge.  Safety planning was undertaken including the potential referral and returning to see his outpatient pscyhiatrist.  At the time of discharge there were no notable risk factors that would be modifiable by inpatient admission.  His chronic risk is significantly elevated given his history, but his imminent risk was not noted to be above this baseline risk.  Appropriate referrals were provided.

## 2019-01-24 NOTE — ED BEHAVIORAL HEALTH ASSESSMENT NOTE - HPI (INCLUDE ILLNESS QUALITY, SEVERITY, DURATION, TIMING, CONTEXT, MODIFYING FACTORS, ASSOCIATED SIGNS AND SYMPTOMS)
52 yo male, undomiciled and unemployed,  from wife "for a few years," with prior chart hx of depression, followed OP by Dr. Eric in [place], with 6 prior IPP admissions (last this December), one reported SA via OD (told wife right after attempt), with current cocaine, MJ and alcohol used h/o, w/ hx of incarceration for drug possession and larceny, presenting to ED reporting SI and depression, with hx of multiple ED visits for similar presentation, with psych consult to assess suicidality. Pt reports he was in [place] in park with friend who he usually does drugs with, but pt did not have drugs, when he began to feel depressed and that he wanted to end his life by jumping in front of train. Pt reports he has had these feelings before but has not acted on them due to "willpower," and that they resolve within a few days. He reports his last episode of SI was "a while ago," but when asked about his presentation to [facility] ED on 1/16 he reports it was for similar feelings and then eloped "because the feelings went away." Pt says he came from [place] to [facility] ED to "get help with BINA treatment," even though process involved getting on trains. Pt says he doesn't know what prevented him from taking his own life during this process. He endorses poor sleep 2/2 to being undomiciled, reports good appetite. He describes his depression as "feeling worthless."He reports enjoying playing soccer currently. Pt denies ROS for psychosis, jadon or further ROS for depression at this time. He reports he follows OP psych in [place], but ran out of medication two days ago.   Pt says he last used 100 dollars worth of cocaine two days ago with a couple of days/week use, last MJ use of one blunt two days ago with daily use, and last alcohol use two days ago with 1-2 beers, with every other day use. CIWA 0.

## 2019-01-24 NOTE — ED PROVIDER NOTE - PHYSICAL EXAMINATION
--EXAM--  VITAL SIGNS: I have reviewed vs documented at present.  CONSTITUTIONAL: Well-developed; well-nourished; in no acute distress.   SKIN: Warm and dry, no acute rash.   HEAD: Normocephalic; atraumatic.  EYES: conjunctiva and sclera clear. No nystagmus.  ENT: No nasal discharge; airway clear.  NECK: Supple; non tender.  CARD: S1, S2, Regular rate and rhythm.   RESP: No wheezes, rales or rhonchi.  ABD: Normal bowel sounds; soft; non-distended; non-tender.  EXT: Normal ROM.   NEURO: Alert, oriented, grossly unremarkable.  PSYCH: Cooperative, appropriate.

## 2019-01-24 NOTE — ED BEHAVIORAL HEALTH ASSESSMENT NOTE - REFERRAL / APPOINTMENT DETAILS
Recommend pt refer and f/u with Central Intake for inpatient rehab assessment at [facility] [place] ()

## 2019-01-24 NOTE — ED BEHAVIORAL HEALTH ASSESSMENT NOTE - DESCRIPTION
pt lying in ED stretcher with 1:1 at bedside. Pt examine in private setting. as per chart review- HTN  from wife who lives in [place], no children, 12th grade ed

## 2019-02-16 ENCOUNTER — EMERGENCY (EMERGENCY)
Facility: HOSPITAL | Age: 54
LOS: 1 days | Discharge: HOME | End: 2019-02-16
Attending: EMERGENCY MEDICINE | Admitting: EMERGENCY MEDICINE
Payer: MEDICAID

## 2019-02-16 VITALS
HEART RATE: 85 BPM | OXYGEN SATURATION: 97 % | TEMPERATURE: 99 F | SYSTOLIC BLOOD PRESSURE: 149 MMHG | DIASTOLIC BLOOD PRESSURE: 84 MMHG | RESPIRATION RATE: 18 BRPM

## 2019-02-16 PROCEDURE — 99285 EMERGENCY DEPT VISIT HI MDM: CPT | Mod: 25

## 2019-02-16 NOTE — ED ADULT NURSE NOTE - CHIEF COMPLAINT QUOTE
Pt st: "I am depressed and suicidial." " I am suppose to be taking medication but ran out....seroquel and depakote." Denies drug /etoh use. Pt hx of Manic depression.... pt st" I was hospitalized few months ago at [facility]....I hear voices that tell me I'm no good."

## 2019-02-16 NOTE — ED ADULT TRIAGE NOTE - CHIEF COMPLAINT QUOTE
Pt st: "I am depressed and suicidial." " I am suppose to be taking medication but ran out....seroquel and depakote." Denies drug /etoh use. Pt hx of Manic depression.... pt st" I was hospitalized few months ago at [facility]....I hear voices that tell me I'm no good."

## 2019-02-16 NOTE — ED ADULT NURSE NOTE - OBJECTIVE STATEMENT
Pt arrives to  area calm and cooperative.  Pt belongings checked and secured by staff.  Pt checked by metal detector.  Pt changed into gown and scrubs.  Pt lab drawn and sent.  Pt reports feeling depressed and hearing voices telling him he is not good.  Pt denies SI/HI at this time.  Pt awaiting Psych assessment.

## 2019-02-16 NOTE — ED ADULT NURSE NOTE - NSIMPLEMENTINTERV_GEN_ALL_ED
Implemented All Universal Safety Interventions:  [place] to call system. Call bell, personal items and telephone within reach. Instruct patient to call for assistance. Room bathroom lighting operational. Non-slip footwear when patient is off stretcher. Physically safe environment: no spills, clutter or unnecessary equipment. Stretcher in lowest position, wheels locked, appropriate side rails in place.

## 2019-02-17 VITALS
HEART RATE: 62 BPM | TEMPERATURE: 97 F | RESPIRATION RATE: 16 BRPM | DIASTOLIC BLOOD PRESSURE: 85 MMHG | SYSTOLIC BLOOD PRESSURE: 148 MMHG | OXYGEN SATURATION: 99 %

## 2019-02-17 LAB
APAP SERPL-MCNC: < 15 UG/ML — LOW (ref 15–25)
ETHANOL BLD-MCNC: < 10 MG/DL — SIGNIFICANT CHANGE UP
SALICYLATES SERPL-MCNC: < 5 MG/DL — LOW (ref 15–30)

## 2019-02-17 PROCEDURE — 90792 PSYCH DIAG EVAL W/MED SRVCS: CPT

## 2019-02-17 NOTE — ED PROVIDER NOTE - OBJECTIVE STATEMENT
52 y/o  M   hx   Bipolar presents to ER  w c/o worsening depression and susdidal ideations to overdose on pills .   Admits that he  has not taken his medications - Seroquel and Depakote  x 2 weeks .  Denies falling, punching or kicking any objects.  Denies HI/AH/VH. Denies pain  SOB , fever, chills, chest/alcohol discomfort.  Denies recent use of  alcohol or illicit drugs. 54 y/o  M   hx   Bipolar presents to ER  w c/o worsening depression and suicidal ideations to overdose on pills .   Admits that he  has not taken his medications - Seroquel and Depakote  x 1 week .  Denies falling, punching or kicking any objects.  Denies HI/AH/VH. Denies pain  SOB , fever, chills, chest/alcohol discomfort.  Denies recent use of  alcohol or illicit drugs.
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
## 2019-02-17 NOTE — ED BEHAVIORAL HEALTH ASSESSMENT NOTE - SUICIDE PROTECTIVE FACTORS
Ability to cope with stress/High frustration tolerance/Future oriented/Positive therapeutic relationships

## 2019-02-17 NOTE — ED BEHAVIORAL HEALTH ASSESSMENT NOTE - DESCRIPTION
as per chart review- HTN  from wife who lives in [place], no children, 12th grade ed Client sleeping, pt examine in private setting. Clam, sleeping, and compliant with care.

## 2019-02-17 NOTE — ED BEHAVIORAL HEALTH ASSESSMENT NOTE - HPI (INCLUDE ILLNESS QUALITY, SEVERITY, DURATION, TIMING, CONTEXT, MODIFYING FACTORS, ASSOCIATED SIGNS AND SYMPTOMS)
54 yo male, undomiciled and unemployed,  from wife "for a few years," with prior chart hx of depression, followed OP by Dr. Eric in [place], with 6 prior IPP admissions (Last at  [facility] 1/23 on quetiapine 200mg QHS), one reported SA via OD (told wife right after attempt), with remote cocaine, MJ and alcohol used h/o, w/ hx of incarceration for drug possession and larceny, presenting to ED reporting SI and depression, with hx of multiple ED visits for similar presentation, with psych consult to assess suicidality.      Client reports that he has been feeling depressed for a few weeks, n/c with quetiapine and depakote.  In the settings of a conflict with his girlfriend he was having SI to jump in front a train.  He has had similar thoughts in the past, and has presented to the ED with similar reports.  No prior attempts.     He reports that he has been not sleeping for a few days, and has been drinking beers last about 2 days ago, sometimes up to 12 per day.  He endorses poor sleep 2/2 to being undomiciled, reports good appetite. He describes his depression as "feeling worthless."      He asked the writer, if he can sleep.  No active SI, no AH, no paranoia. 52 yo male, undomiciled and unemployed,  from wife "for a few years," with prior chart hx of depression, followed OP by Dr. Eric in [place], with 6 prior IPP admissions (Last at  [facility] 1/23 on quetiapine 200mg QHS), one reported SA via OD (told wife right after attempt), with remote cocaine, MJ and alcohol used h/o, w/ hx of incarceration for drug possession and larceny, presenting to ED reporting SI and depression, with hx of multiple ED visits for similar presentation, with psych consult to assess suicidality.      Client reports that he has been feeling depressed for a few weeks, n/c with quetiapine and depakote.  He has been homeless and moves from shelter to shelter, but [place] in the park.  In the settings of a conflict with his girlfriend he was having SI to jump in front a train a few days ago, and has been feeling depressed for a unclear timeline.  He has had similar thoughts in the past, and has presented to the ED with similar reports.       He reports that he has been not sleeping for a few days, and has been drinking beers last about 2 days ago, sometimes up to 12 per day.  He endorses poor sleep 2/2 to being undomiciled, reports good appetite. He describes his depression as "feeling worthless."      He asked the writer, if he can sleep.  No active SI, no AH, no paranoia.

## 2019-02-17 NOTE — ED BEHAVIORAL HEALTH NOTE - BEHAVIORAL HEALTH NOTE
I was informed by RN, that as pt was about to exit the ED reported feeling suicidal. I informed the RN that he will be reevaluated , I was then informed by another RN, Walter  pt changed is mind that he is not suicidal any longer and will be discharged. I reassessed pt fro suicidality in front of the staff and he reports feeling depressed and having active si+ to jump in front of the train tracks. He became upset that the other nurse tried to minimize his symtpoms. The discharge is on hold and will need reevaluation since pt is going back and forth with suicidal ideations. I signed out to DR. Villafana

## 2019-02-17 NOTE — ED BEHAVIORAL HEALTH NOTE - BEHAVIORAL HEALTH NOTE
Rob Swan was signed out to me this morning by Dr. White as a patient that needed re-assessment regarding suicidal statement made and recanted multiple times during his stay in the [facility] ED.  In order to re-evaluate him I reviewed his chart, discussed him with Dr. White and with ALEJANDRINA Vines, and I personally evaluated him in the  area.  In summary he is a 52 yo male currently  from his wife, undomiciled and unemployed, with past h/o depression, ETOH and substance abuse, 6 prior psychiatric admissions (including at [facility] as recently as 1/23), in OP treatment, 1 reported SA by OD, h/o incarceration in the past, and many ED presentations similar to his presentation yesterday where he endorsed SI which he later recanted.  He carries d/o malingering and substance induced d/o.  As per Dr. White's sign out, Mr. Swan slept in our ED last night after having endorsed suicidal thoughts, later this am he denied SI but as he about to leave the ED again reported SI which he denied to nursing staff.  He was then evaluated by Dr. White and endorsed to her feeling depressed with SI with idea to jump in front of a train (please see her note from this am for details.)     This provider evaluated Mr. Rivas personally and individually at 9:00am this morning.  He was found sitting calmly in the  area and was cooperative.  He denied having SI or intending to jump in front of a train.  He said he sometimes feels depressed and that today he feels like his usual self, not too depressed.  He explained plan of going to visit a friend of his in [place] after discharge from our ED.  When questioned as to why he endorsed SI as recently as hours ago he acknowledged feeling angry at staff because he was not allowed to watch TV.  He verbalized desire to continue with his OP treatment and said that he knows to call 911 and or return to the ED if he feels unsafe for any reasons.      Mr. Swan has a baseline chronic high risk of self harm due to his h/o substance abuse, non-adherence with meds, h/o SI, unemployment, separation from wife, and homelessness.  However he has factors that mitigate his risk including current lack of SI, future orientation, h/o help-seeking behavior and desire to c/w OP treatment.  At this time he doesn't present a risk to himself any higher than his baseline risk and he is endorsing desire to be discharged.  Given that he is not at acute risk of self-harm at this point involuntary psychiatric admission is not warranted thus he is discharged home.

## 2019-02-17 NOTE — ED BEHAVIORAL HEALTH ASSESSMENT NOTE - SUMMARY
54 yo male, undomiciled and unemployed,  from wife "for a few years," with prior chart hx of depression, followed OP by Dr. Eric in [place], with 6 prior IPP admissions (last this December), one reported SA via OD (told wife right after attempt), with current cocaine, MJ and alcohol used h/o, w/ hx of incarceration for drug possession and larceny, presenting to ED reporting SI and depression, with hx of multiple ED visits for similar presentation, with psych consult to assess suicidality. Pt presents endorsing depressed mood and desire to jump in front of train in context of recent substance use, however currently he appears euthymic with linear thought process, without notable thought content, perceptual abnormalities, or disorganization. Pt presents with inconsistent history, reports he was able to travel from [place] to  via train despite suicidality, and recently presented to [facility] ED with similar symptoms and later eloped. Suspicion high for malingering vs mood d/o 2/2 to substance use. Pt offered CDRU referral and shelter referral, but refused at this time. No objective signs of withdrawal observed. At this time, pt not considered acute danger to self or others and does not qualify for emergency IPP admission. 54 yo male, undomiciled and unemployed,  from wife "for a few years," with prior chart hx of depression, followed OP, with 6 prior IPP admissions (Last at  [facility] 1/23 on quetiapine 200mg QHS), one reported SA via OD (told wife right after attempt), with remote cocaine, MJ and alcohol used h/o, w/ hx of incarceration for drug possession and larceny, presenting to ED reporting SI and depression, with hx of multiple ED visits for similar presentation.  The patient is presenting in a similar pattern as in prior ed evals with no clear acute change in symptoms, history of mental status all suggestive of character pathology. The patient is no longer having SI and does not appears severely depressed, psychotic, or intoxicated and remains a chronic risk to himself with his inconsistent treatment and poor follow up.

## 2019-02-17 NOTE — ED BEHAVIORAL HEALTH ASSESSMENT NOTE - OTHER PAST PSYCHIATRIC HISTORY (INCLUDE DETAILS REGARDING ONSET, COURSE OF ILLNESS, INPATIENT/OUTPATIENT TREATMENT)
Reports first IPP admission approx. 2-3 years ago, with 5-6 IPP admits since then. Reports last few at admits at [facility] 12/30-12/17/18, and then 1/16/19 to 1/23/19.  He reports he follows up OP with Dr. Eric in [place]. Reports first IPP admission approx. 2-3 years ago, with 5-6 IPP admits since then. Reports last few at admits at [facility] 12/30-12/17/18, and then 1/16/19 to 1/23/19.

## 2019-02-17 NOTE — ED BEHAVIORAL HEALTH ASSESSMENT NOTE - SAFETY PLAN DETAILS
if symptoms worsen please return to ED, call 911, [phone] life [phone], [phone] [facility] well

## 2019-02-17 NOTE — ED PROVIDER NOTE - CLINICAL SUMMARY MEDICAL DECISION MAKING FREE TEXT BOX
54 y/o  M   hx   Bipolar  Medical evaluation performed. There is no clinical evidence of intoxication or any acute medical problem requiring immediate intervention. Patient is awaiting psychiatric consultation. Final disposition will be determined by psychiatrist.

## 2019-02-17 NOTE — ED BEHAVIORAL HEALTH ASSESSMENT NOTE - REFERRAL / APPOINTMENT DETAILS
Recommend pt refer and f/u with Central Intake for inpatient rehab assessment at [facility] [place] () Patient provided rehab information, and crisis centers

## 2019-02-17 NOTE — ED ADULT NURSE REASSESSMENT NOTE - NS ED NURSE REASSESS COMMENT FT1
Pt changing statements upon discharge stating he "denies SI" but then states if he leaves he "will jump in front of a train."  Psychiatrist reassessed and pt is to wait for next shift Psychiatrist evaluation.  Pt waiting in  room 5 for reevaluation.
Received report from night RN JL pt calm & cooperative  pt currently denies si/hi/avh, pt d/c by MD resources provided upon discharge pt verbalized understanding
Pt sleeping in  room 5.  Pt respirations even and unlabored.  Pt awaiting medical MD clearance.

## 2019-02-17 NOTE — ED BEHAVIORAL HEALTH ASSESSMENT NOTE - DESCRIPTION (FIRST USE, LAST USE, QUANTITY, FREQUENCY, DURATION)
one blunt daily Remote Reports  substance use began age 12, denies significant sober period 1-2 beers every other day. Reports previous use of 12 beers a few months ago. Reports 3 detox/rehab admits. Last "a few months ago." 1-2 beers every other day. Reports previous use of 12 beers a few days. Reports 3 detox/rehab admits. Last "a few months ago."

## 2019-02-19 ENCOUNTER — EMERGENCY (EMERGENCY)
Facility: HOSPITAL | Age: 54
LOS: 1 days | Discharge: HOME | End: 2019-02-19
Attending: EMERGENCY MEDICINE | Admitting: EMERGENCY MEDICINE
Payer: COMMERCIAL

## 2019-02-19 VITALS
RESPIRATION RATE: 18 BRPM | DIASTOLIC BLOOD PRESSURE: 88 MMHG | HEART RATE: 76 BPM | TEMPERATURE: 98 F | SYSTOLIC BLOOD PRESSURE: 157 MMHG | OXYGEN SATURATION: 99 % | WEIGHT: 240.08 LBS

## 2019-02-19 DIAGNOSIS — Z59.0 HOMELESSNESS: ICD-10-CM

## 2019-02-19 DIAGNOSIS — I10 ESSENTIAL (PRIMARY) HYPERTENSION: ICD-10-CM

## 2019-02-19 DIAGNOSIS — F32.9 MAJOR DEPRESSIVE DISORDER, SINGLE EPISODE, UNSPECIFIED: ICD-10-CM

## 2019-02-19 DIAGNOSIS — R45.851 SUICIDAL IDEATIONS: ICD-10-CM

## 2019-02-19 DIAGNOSIS — Z91.013 ALLERGY TO SEAFOOD: ICD-10-CM

## 2019-02-19 PROCEDURE — 99283 EMERGENCY DEPT VISIT LOW MDM: CPT | Mod: 25

## 2019-02-19 PROCEDURE — 80307 DRUG TEST PRSMV CHEM ANLYZR: CPT

## 2019-02-19 PROCEDURE — 81003 URINALYSIS AUTO W/O SCOPE: CPT

## 2019-02-19 PROCEDURE — 93010 ELECTROCARDIOGRAM REPORT: CPT

## 2019-02-19 PROCEDURE — 80048 BASIC METABOLIC PNL TOTAL CA: CPT

## 2019-02-19 PROCEDURE — 85025 COMPLETE CBC W/AUTO DIFF WBC: CPT

## 2019-02-19 PROCEDURE — 99284 EMERGENCY DEPT VISIT MOD MDM: CPT | Mod: 25

## 2019-02-19 PROCEDURE — 93005 ELECTROCARDIOGRAM TRACING: CPT

## 2019-02-19 PROCEDURE — 36415 COLL VENOUS BLD VENIPUNCTURE: CPT

## 2019-02-19 SDOH — ECONOMIC STABILITY - HOUSING INSECURITY: HOMELESSNESS: Z59.0

## 2019-02-19 NOTE — ED ADULT TRIAGE NOTE - CHIEF COMPLAINT QUOTE
pt complaining of suicidal ideation also hearing voices tell him he is worthless denies homicidal ideation drug or ETOH use reports hx of depression noncompliant with Depakote and Seroquel pt calm cooperative for triage process

## 2019-02-19 NOTE — ED ADULT NURSE NOTE - NSIMPLEMENTINTERV_GEN_ALL_ED
Implemented All Universal Safety Interventions:  [place] to call system. Call bell, personal items and telephone within reach. Instruct patient to call for assistance. Room bathroom lighting operational. Non-slip footwear when patient is off stretcher. Physically safe environment: no spills, clutter or unnecessary equipment. Stretcher in lowest position, wheels locked, appropriate side rails in place.

## 2019-02-20 LAB
ANION GAP SERPL CALC-SCNC: 12 MMOL/L — SIGNIFICANT CHANGE UP (ref 5–17)
APPEARANCE UR: CLEAR — SIGNIFICANT CHANGE UP
BASOPHILS # BLD AUTO: 0.05 K/UL — SIGNIFICANT CHANGE UP (ref 0–0.2)
BASOPHILS NFR BLD AUTO: 0.6 % — SIGNIFICANT CHANGE UP (ref 0–2)
BILIRUB UR-MCNC: NEGATIVE — SIGNIFICANT CHANGE UP
BUN SERPL-MCNC: 13 MG/DL — SIGNIFICANT CHANGE UP (ref 7–23)
CALCIUM SERPL-MCNC: 9 MG/DL — SIGNIFICANT CHANGE UP (ref 8.4–10.5)
CHLORIDE SERPL-SCNC: 101 MMOL/L — SIGNIFICANT CHANGE UP (ref 96–108)
CO2 SERPL-SCNC: 25 MMOL/L — SIGNIFICANT CHANGE UP (ref 22–31)
COLOR SPEC: YELLOW — SIGNIFICANT CHANGE UP
CREAT SERPL-MCNC: 1.38 MG/DL — HIGH (ref 0.5–1.3)
DIFF PNL FLD: NEGATIVE — SIGNIFICANT CHANGE UP
EOSINOPHIL # BLD AUTO: 0.4 K/UL — SIGNIFICANT CHANGE UP (ref 0–0.5)
EOSINOPHIL NFR BLD AUTO: 4.4 % — SIGNIFICANT CHANGE UP (ref 0–6)
ETHANOL SERPL-MCNC: <10 MG/DL — SIGNIFICANT CHANGE UP (ref 0–10)
GLUCOSE SERPL-MCNC: 103 MG/DL — HIGH (ref 70–99)
GLUCOSE UR QL: NEGATIVE — SIGNIFICANT CHANGE UP
HCT VFR BLD CALC: 38.6 % — LOW (ref 39–50)
HGB BLD-MCNC: 12.5 G/DL — LOW (ref 13–17)
IMM GRANULOCYTES NFR BLD AUTO: 0.1 % — SIGNIFICANT CHANGE UP (ref 0–1.5)
KETONES UR-MCNC: NEGATIVE — SIGNIFICANT CHANGE UP
LEUKOCYTE ESTERASE UR-ACNC: NEGATIVE — SIGNIFICANT CHANGE UP
LYMPHOCYTES # BLD AUTO: 3.55 K/UL — HIGH (ref 1–3.3)
LYMPHOCYTES # BLD AUTO: 39.5 % — SIGNIFICANT CHANGE UP (ref 13–44)
MCHC RBC-ENTMCNC: 29.8 PG — SIGNIFICANT CHANGE UP (ref 27–34)
MCHC RBC-ENTMCNC: 32.4 GM/DL — SIGNIFICANT CHANGE UP (ref 32–36)
MCV RBC AUTO: 91.9 FL — SIGNIFICANT CHANGE UP (ref 80–100)
MONOCYTES # BLD AUTO: 0.62 K/UL — SIGNIFICANT CHANGE UP (ref 0–0.9)
MONOCYTES NFR BLD AUTO: 6.9 % — SIGNIFICANT CHANGE UP (ref 2–14)
NEUTROPHILS # BLD AUTO: 4.36 K/UL — SIGNIFICANT CHANGE UP (ref 1.8–7.4)
NEUTROPHILS NFR BLD AUTO: 48.5 % — SIGNIFICANT CHANGE UP (ref 43–77)
NITRITE UR-MCNC: NEGATIVE — SIGNIFICANT CHANGE UP
NRBC # BLD: 0 /100 WBCS — SIGNIFICANT CHANGE UP (ref 0–0)
PH UR: 6 — SIGNIFICANT CHANGE UP (ref 5–8)
PLATELET # BLD AUTO: 191 K/UL — SIGNIFICANT CHANGE UP (ref 150–400)
POTASSIUM SERPL-MCNC: 3.9 MMOL/L — SIGNIFICANT CHANGE UP (ref 3.5–5.3)
POTASSIUM SERPL-SCNC: 3.9 MMOL/L — SIGNIFICANT CHANGE UP (ref 3.5–5.3)
PROT UR-MCNC: NEGATIVE MG/DL — SIGNIFICANT CHANGE UP
RBC # BLD: 4.2 M/UL — SIGNIFICANT CHANGE UP (ref 4.2–5.8)
RBC # FLD: 13.2 % — SIGNIFICANT CHANGE UP (ref 10.3–14.5)
SODIUM SERPL-SCNC: 138 MMOL/L — SIGNIFICANT CHANGE UP (ref 135–145)
SP GR SPEC: 1.02 — SIGNIFICANT CHANGE UP (ref 1–1.03)
UROBILINOGEN FLD QL: 0.2 E.U./DL — SIGNIFICANT CHANGE UP
WBC # BLD: 8.99 K/UL — SIGNIFICANT CHANGE UP (ref 3.8–10.5)
WBC # FLD AUTO: 8.99 K/UL — SIGNIFICANT CHANGE UP (ref 3.8–10.5)

## 2019-02-20 NOTE — ED PROVIDER NOTE - NSFOLLOWUPINSTRUCTIONS_ED_ALL_ED_FT
Please see your primary care provider in 24-48 hours.  Return to the ER if symptoms worsen or other concerns.    See referral to detox centers/ homeless shelters.    Depression    Depression is a mental illness that usually causes feelings of sadness, hopelessness, or helplessness. Some people with this disorder do not feel particularly sad but lose interest in doing things they used to enjoy. Major depressive disorder also can cause physical symptoms. It can interfere with work, school, relationships, and other normal everyday activities. If you were started on a medication, make sure to take exactly as prescribed and follow up with a psychiatrist.    SEEK IMMEDIATE MEDICAL CARE IF YOU HAVE ANY OF THE FOLLOWING SYMPTOMS: thoughts about hurting or killing yourself, thoughts about hurting or killing somebody else, hallucinations, or worsening depression.
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
## 2019-02-20 NOTE — ED BEHAVIORAL HEALTH ASSESSMENT NOTE - SUMMARY
54 yo male, undomiciled and unemployed,  from wife "for a few years," with prior chart hx of depression, followed OP, with 6 prior IPP admissions (Last at  [facility] 1/23 on quetiapine 200mg QHS), one reported SA via OD (told wife right after attempt), with remote cocaine, MJ and alcohol used h/o, w/ hx of incarceration for drug possession and larceny, presenting to ED reporting SI and depression, with hx of multiple ED visits for similar presentation.  The patient is presenting in a similar pattern as in prior ed evals with no clear acute change in symptoms, history of mental status all suggestive of character pathology. The patient is no longer having SI and does not appears severely depressed, psychotic, or intoxicated and remains a chronic risk to himself with his inconsistent treatment and poor follow up. 54 yo male, undomiciled and unemployed,  from wife "for a few years," with prior chart hx of depression, followed OP by Dr. Eric in [place], with 6 prior IPP admissions (Last at  [facility] 1/23 on quetiapine 200mg QHS), one reported SA via OD (told wife right after attempt), with remote cocaine, MJ and alcohol used h/o, w/ hx of incarceration for drug possession and larceny, presenting to ED reporting SI and depression, with hx of multiple ED visits for similar presentation, with secondary gain of bed.  Chronic non-compliance and no f/u.     pt. endorses non-compliance and reports recent hospitalization leaving A after a few days with non-compliance with quetiapine and depakote.  He has been homeless and moves from shelter to shelter, but often sleeps in the park.  In the settings of a conflict with his girlfriend who he has not seen for 5 days and has been feeling depressed for a unclear timeline.  He has had similar thoughts in the past, and has presented to the ED with similar reports.       The patient is no longer having SI and does not appears severely depressed, psychotic, or intoxicated and remains a chronic risk to himself with his inconsistent treatment and no follow up.

## 2019-02-20 NOTE — ED PROVIDER NOTE - CLINICAL SUMMARY MEDICAL DECISION MAKING FREE TEXT BOX
recurrent depression/ reported si/auditory hallucinations with recent substance abuse.  triggered by homelessness/ argument with gf.  tele psych consulted.  cleared for discharge, felt related to secondary gain.  rec referral to outpatient detox.  referral sheet/ homeless shelters given

## 2019-02-20 NOTE — ED BEHAVIORAL HEALTH ASSESSMENT NOTE - DESCRIPTION
Clam, sleeping, and compliant with care. as per chart review- HTN  from wife who lives in [place], no children, 12th grade ed calm sleeping, and compliant with care. calm sleeping, and compliant with care.    Records emailed to BTA: Lilibeth Potts, ABRAHAM    Records checked- no data: Alpha ED, [facility] Inpatient, Alpha CL, HIE Outpatient Medical, HIE Outpatient BH, HIE ED, Tier Inpatient, Tier E&A, Meditech Inpatient, Quick Docs, Healthix, Scan ER, One Content Inpatient, One Content CL, Social Media (For example - Facebook, Mojiva, Youbetme), Web search, Forensic Databases (For example - [email or web address] or [email or web address])     Pre-Hospital Course: Patient arrived via walk-in, no immediate pre-hospital course available.    ED Course:  Patient arrived to the ED approximately 2 hours prior to consultation. Per ED RN and documentation patient arrived alert and oriented x3, grooming and hygiene appears normal, he is cooperative with ED staff and safety protocols, patient has clothing but no other items of note in property. Patient reports depressed mood his affect is reported to be flat and depressed, he reports auditory hallucinations telling him he is worthless and suicidal ideation without a specific plan stating it stems from his girlfriend kicking him out and him having nowhere to stay, patient denies homicidal ideation, he does not appear psychotic or manic, patient has linear thought process, normal speech, no other observed mental status examination positives. Patient was not observed to eat in ED but reported to have spent majority of course sleeping, no visitors to bedside. Patient remained in good behavioral control in ED and did not require PRN psychiatric medication for agitation prior to assessment. No other issues reported.     Collateral Sources: None available

## 2019-02-20 NOTE — ED BEHAVIORAL HEALTH ASSESSMENT NOTE - DESCRIPTION (FIRST USE, LAST USE, QUANTITY, FREQUENCY, DURATION)
1-2 beers every other day. Reports previous use of 12 beers a few days. Reports 3 detox/rehab admits. Last "a few months ago." one blunt daily Remote Reports  substance use began age 12, denies significant sober period Reports 3 detox/rehab admits. Last "a few months ago."  Patient drinks etoh, 2-3 beers every other day

## 2019-02-20 NOTE — ED BEHAVIORAL HEALTH ASSESSMENT NOTE - OTHER PAST PSYCHIATRIC HISTORY (INCLUDE DETAILS REGARDING ONSET, COURSE OF ILLNESS, INPATIENT/OUTPATIENT TREATMENT)
Reports first IPP admission approx. 2-3 years ago, with 5-6 IPP admits since then. Reports last few at admits at [facility] 12/30-12/17/18, and then 1/16/19 to 1/23/19.

## 2019-02-20 NOTE — ED BEHAVIORAL HEALTH ASSESSMENT NOTE - SUICIDE PROTECTIVE FACTORS
Future oriented/Ability to cope with stress/High frustration tolerance/Positive therapeutic relationships Future oriented/High frustration tolerance/Ability to cope with stress

## 2019-02-20 NOTE — ED BEHAVIORAL HEALTH ASSESSMENT NOTE - RISK ASSESSMENT
pt considered chronically high risk 2/2 to substance use. Pt refused rehab at this time. Pt's risk factors of med non compliance, and suicidal ideation can be mitigated by willingness to come to ED, lack of suicidal intent, future orientation, lack of means, and help seeking behaviour. pt considered chronically high risk 2/2 to substance use. Patient. asked for drug treatment.  Pt's risk factors of med non compliance, and suicidal ideation can be mitigated by willingness to come to ED, lack of suicidal intent, future orientation, lack of means, and help seeking behaviour.  Drive to come to hospital seems fueled by homelessness and pt. looking for a bed.

## 2019-02-20 NOTE — ED PROVIDER NOTE - OBJECTIVE STATEMENT
here with auditory hallucinations telling him he is worthless, thoughts of suicide/ depression.  Says he used cocaine and alcohol 2 days ago.  Had fight with girlfriend and has no where to stay today which he says triggered symptoms today.  No fever/chills/chest pain/ sob.  Non compliant with outpatient medications/ followup

## 2019-02-20 NOTE — ED BEHAVIORAL HEALTH ASSESSMENT NOTE - HPI (INCLUDE ILLNESS QUALITY, SEVERITY, DURATION, TIMING, CONTEXT, MODIFYING FACTORS, ASSOCIATED SIGNS AND SYMPTOMS)
52 yo male, undomiciled and unemployed,  from wife "for a few years," with prior chart hx of depression, followed OP by Dr. Eric in [place], with 6 prior IPP admissions (Last at  [facility] 1/23 on quetiapine 200mg QHS), one reported SA via OD (told wife right after attempt), with remote cocaine, MJ and alcohol used h/o, w/ hx of incarceration for drug possession and larceny, presenting to ED reporting SI and depression, with hx of multiple ED visits for similar presentation, with psych consult to assess suicidality.      Client reports that he has been feeling depressed for a few weeks, n/c with quetiapine and depakote.  He has been homeless and moves from shelter to shelter, but often sleeps in the park.  In the settings of a conflict with his girlfriend he was having SI to jump in front a train a few days ago, and has been feeling depressed for a unclear timeline.  He has had similar thoughts in the past, and has presented to the ED with similar reports.       He reports that he has been not sleeping for a few days, and has been drinking beers last about 2 days ago, sometimes up to 12 per day.  He endorses poor sleep 2/2 to being undomiciled, reports good appetite. He describes his depression as "feeling worthless."      He asked the writer, if he can sleep.  No active SI, no AH, no paranoia. 52 yo male, undomiciled and unemployed,  from wife "for a few years," with prior chart hx of depression, followed OP by Dr. Eric in [place], with 6 prior IPP admissions (Last at  [facility] 1/23 on quetiapine 200mg QHS), one reported SA via OD (told wife right after attempt), with remote cocaine, MJ and alcohol used h/o, w/ hx of incarceration for drug possession and larceny, presenting to ED reporting SI and depression, with hx of multiple ED visits for similar presentation, with psych consult to assess suicidality.    pt. endorses non-compliance and reports recent hospitalization leaving A after a few days with non-compliance with quetiapine and depakote.  He has been homeless and moves from shelter to shelter, but often sleeps in the park.  In the settings of a conflict with his girlfriend who he has not seen for 5 days and has been feeling depressed for a unclear timeline.  He has had similar thoughts in the past, and has presented to the ED with similar reports.       He reports that he has been not sleeping for a few days, and states he has not used cocaine, etoh or marijuana for a few days.  He endorses poor sleep 2/2 to being undomiciled, reports good appetite. He describes his depression as "feeling worthless."      He asked the writer, if he can sleep.  No active SI, no AH, no paranoia.

## 2019-05-12 ENCOUNTER — EMERGENCY (EMERGENCY)
Facility: HOSPITAL | Age: 54
LOS: 1 days | Discharge: HOME | End: 2019-05-12
Attending: EMERGENCY MEDICINE | Admitting: EMERGENCY MEDICINE
Payer: COMMERCIAL

## 2019-05-12 VITALS
HEART RATE: 82 BPM | OXYGEN SATURATION: 95 % | TEMPERATURE: 98 F | RESPIRATION RATE: 17 BRPM | DIASTOLIC BLOOD PRESSURE: 90 MMHG | SYSTOLIC BLOOD PRESSURE: 144 MMHG | WEIGHT: 240.08 LBS

## 2019-05-12 VITALS
HEART RATE: 86 BPM | SYSTOLIC BLOOD PRESSURE: 149 MMHG | DIASTOLIC BLOOD PRESSURE: 73 MMHG | RESPIRATION RATE: 16 BRPM | OXYGEN SATURATION: 98 %

## 2019-05-12 DIAGNOSIS — Z79.899 OTHER LONG TERM (CURRENT) DRUG THERAPY: ICD-10-CM

## 2019-05-12 DIAGNOSIS — F32.9 MAJOR DEPRESSIVE DISORDER, SINGLE EPISODE, UNSPECIFIED: ICD-10-CM

## 2019-05-12 DIAGNOSIS — Z91.013 ALLERGY TO SEAFOOD: ICD-10-CM

## 2019-05-12 DIAGNOSIS — R45.851 SUICIDAL IDEATIONS: ICD-10-CM

## 2019-05-12 DIAGNOSIS — I10 ESSENTIAL (PRIMARY) HYPERTENSION: ICD-10-CM

## 2019-05-12 DIAGNOSIS — Z76.5 MALINGERER [CONSCIOUS SIMULATION]: ICD-10-CM

## 2019-05-12 DIAGNOSIS — M25.511 PAIN IN RIGHT SHOULDER: ICD-10-CM

## 2019-05-12 PROCEDURE — 99284 EMERGENCY DEPT VISIT MOD MDM: CPT

## 2019-05-12 PROCEDURE — 99285 EMERGENCY DEPT VISIT HI MDM: CPT

## 2019-05-12 RX ORDER — IBUPROFEN 200 MG
600 TABLET ORAL ONCE
Refills: 0 | Status: COMPLETED | OUTPATIENT
Start: 2019-05-12 | End: 2019-05-12

## 2019-05-12 RX ADMIN — Medication 600 MILLIGRAM(S): at 19:06

## 2019-05-12 RX ADMIN — Medication 600 MILLIGRAM(S): at 18:23

## 2019-05-12 NOTE — ED ADULT NURSE NOTE - NSIMPLEMENTINTERV_GEN_ALL_ED
Implemented All Universal Safety Interventions:  [place] to call system. Call bell, personal items and telephone within reach. Instruct patient to call for assistance. Room bathroom lighting operational. Non-slip footwear when patient is off stretcher. Physically safe environment: no spills, clutter or unnecessary equipment. Stretcher in lowest position, wheels locked, appropriate side rails in place.

## 2019-05-12 NOTE — ED BEHAVIORAL HEALTH ASSESSMENT NOTE - SUMMARY
52 yo male with history of depression, with remote cocaine, MJ and alcohol used h/o, w/ hx of incarceration for drug possession and larceny, presenting to ED reporting SI and depression, with hx of multiple ED visits for similar presentation, with secondary gain of bed. Chronically noncompliant with treatment. On assessment, patient denies suicidal ideation, is minimally cooperative and hostile. His presentation is likely motivated by secondary gain and character pathology. The best course of treatment for this patient would be substance use treatment and medication management on an outpatient basis.
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
## 2019-05-12 NOTE — ED BEHAVIORAL HEALTH ASSESSMENT NOTE - DESCRIPTION (FIRST USE, LAST USE, QUANTITY, FREQUENCY, DURATION)
drinks almost daily one blunt daily Remote Reports  substance use began age 12, denies significant sober period

## 2019-05-12 NOTE — ED PROVIDER NOTE - OBJECTIVE STATEMENT
53yM with PMH HTN (not on meds), PPHx of polysubstance abuse (cocaine, cannabis, alcohol) and charted bipolar disorder, multiple prior inpatient psych admissions, prior suicide attempts, self-injurious behavior, p/w SI and depression. Initially on interview pt just states "i'm exhausted, I just need to rest", but when pressed, states that he is depressed and feeling suicidal. denies seeing an outpatient psychiatrist or taking any meds currently. denies overdose or plan. admits to polysubstance abuse, but states not currently intoxicated. remarks on R shoulder pain, states is chronic since childhood after having dislocation, states has been told that there is calcium in the joint. no recent trauma, no chest pain.  on last two psych evals was felt to be malingering

## 2019-05-12 NOTE — ED ADULT NURSE NOTE - OBJECTIVE STATEMENT
pt states he lives in [place], but is in [facility] for the night.  pt states he does not know where is he going to stay tonight.  pt states he came to the ED because he is depressed.  pt reports SI, denies HI.  pt states his plan is to cut his wrists.  pt reports past suicide attempt with taking pills.  pt denies taking pills today, but reports use of alcohol, cocaine, and marijuana today.  pt denies use of alcohol daily.  pt states he had "1-2 pints" of mixed drinks today.   Also c/o chronic right shoulder pain, denies recent injury.  pt also c/o wounds on both feet.   Non draining wounds noted to top of both feet.  Fissure noted to sole of left foot.   pt states his feet on chronically wet.

## 2019-05-12 NOTE — ED BEHAVIORAL HEALTH ASSESSMENT NOTE - REFERRAL / APPOINTMENT DETAILS
patient can go to [facility] [address] tomorrow morning at 8:30am, 5/13/19 to walk in psychiatric clinic

## 2019-05-12 NOTE — ED BEHAVIORAL HEALTH ASSESSMENT NOTE - DESCRIPTION
as per chart review- HTN  from wife who lives in [place], no children, 12th grade ed Sleeping, minimally cooperative

## 2019-05-12 NOTE — ED BEHAVIORAL HEALTH ASSESSMENT NOTE - HPI (INCLUDE ILLNESS QUALITY, SEVERITY, DURATION, TIMING, CONTEXT, MODIFYING FACTORS, ASSOCIATED SIGNS AND SYMPTOMS)
52 yo male, undomiciled and unemployed,  from wife "for a few years," with prior chart hx of depression, previously followed OP by Dr. Eric in [place] per prior assessments, with 6 prior IPP admissions (Last at  [facility] 1/23 on quetiapine 200mg QHS), one reported SA via OD (told wife right after attempt), with remote cocaine, MJ and alcohol used h/o, w/ hx of incarceration for drug possession and larceny, presenting to ED reporting SI and depression, with hx of multiple ED visits for similar presentation, with psych consult to assess suicidality.      On assessment patient is minimally cooperative, and somewhat hostile. He says he needs a rest and that ''a lot is going on." He intermittently falls asleep during assessment. When asked if he felt suicidal, he shook his head ''no.'' When asked if he was hearing voices or experiencing anything strange, he said no, and then said ''why are you looking at me like that." He said '' look at my feet you can tell I've been moving around and I need some rest."     Patient said ''thanks for your help'' and effectively ended interview.

## 2019-05-12 NOTE — ED BEHAVIORAL HEALTH ASSESSMENT NOTE - OTHER PAST PSYCHIATRIC HISTORY (INCLUDE DETAILS REGARDING ONSET, COURSE OF ILLNESS, INPATIENT/OUTPATIENT TREATMENT)
Reports first IPP admission approx. 2-3 years ago, with 5-6 IPP admits since then. Reports last few at admits at [facility] 12/30-12/17/18, and then 1/16/19 to 1/23/19.

## 2019-05-12 NOTE — ED BEHAVIORAL HEALTH ASSESSMENT NOTE - RISK ASSESSMENT
Patient is at chronically elevated risk of harm to self due to substance use, likely character pathology, noncompliance. His imminent risk is mitigated by willingness to come to ED, denial of active SI.

## 2019-05-12 NOTE — ED PROVIDER NOTE - PHYSICAL EXAMINATION
CONSTITUTIONAL: Well-appearing; well-nourished; in no apparent distress.   HEAD: Normocephalic; atraumatic.   EYES:  conjunctiva and sclera clear  ENT: normal nose; no rhinorrhea  NECK: Supple; non-tender;   CARDIOVASCULAR: Normal S1, S2; no murmurs, rubs, or gallops. Regular rate and rhythm.   RESPIRATORY: Breathing easily; breath sounds clear and equal bilaterally; no wheezes, rhonchi, or rales.  GI: Soft; non-distended; non-tender; no palpable organomegaly.   EXT: No cyanosis or edema; N/V intact. R shoulder non tender to palpation, no deformity or breaks in skin, full ROM  SKIN: Normal for age and race; warm; dry; good turgor; no apparent lesions or rash.   NEURO: A & O x 3; face symmetric; grossly unremarkable.   PSYCHOLOGICAL: The patient’s mood and manner are appropriate. endorses SI, depression, denies plan.

## 2019-05-12 NOTE — ED ADULT TRIAGE NOTE - CHIEF COMPLAINT QUOTE
Patient complaining of suicidal ideation.  Patient also complaining of cough and right shoulder pain.  Patient denies any HI, AH/VH, CP, SOB, dizziness, or any other complaints.   Security notified, belongings checked, patient wanded.

## 2019-05-12 NOTE — ED PROVIDER NOTE - CLINICAL SUMMARY MEDICAL DECISION MAKING FREE TEXT BOX
here w/ complaint of depression and suicidal ideation, non compliant w/ meds. plan for psych consult  ibuprofen given for his chronic shoulder pain.

## 2019-05-28 ENCOUNTER — EMERGENCY (EMERGENCY)
Facility: HOSPITAL | Age: 54
LOS: 1 days | Discharge: HOME | End: 2019-05-28
Attending: EMERGENCY MEDICINE | Admitting: EMERGENCY MEDICINE
Payer: MEDICAID

## 2019-05-28 VITALS
SYSTOLIC BLOOD PRESSURE: 154 MMHG | OXYGEN SATURATION: 96 % | HEART RATE: 91 BPM | DIASTOLIC BLOOD PRESSURE: 97 MMHG | RESPIRATION RATE: 18 BRPM | TEMPERATURE: 98 F

## 2019-05-28 VITALS
SYSTOLIC BLOOD PRESSURE: 144 MMHG | RESPIRATION RATE: 18 BRPM | OXYGEN SATURATION: 97 % | DIASTOLIC BLOOD PRESSURE: 83 MMHG | TEMPERATURE: 98 F | HEART RATE: 80 BPM

## 2019-05-28 DIAGNOSIS — F10.10 ALCOHOL ABUSE, UNCOMPLICATED: ICD-10-CM

## 2019-05-28 DIAGNOSIS — F14.94 COCAINE USE, UNSPECIFIED WITH COCAINE-INDUCED MOOD DISORDER: ICD-10-CM

## 2019-05-28 DIAGNOSIS — F12.10 CANNABIS ABUSE, UNCOMPLICATED: ICD-10-CM

## 2019-05-28 LAB
ALBUMIN SERPL ELPH-MCNC: 3.6 G/DL — SIGNIFICANT CHANGE UP (ref 3.4–5)
ALP SERPL-CCNC: 64 U/L — SIGNIFICANT CHANGE UP (ref 40–120)
ALT FLD-CCNC: 48 U/L — HIGH (ref 12–42)
ANION GAP SERPL CALC-SCNC: 8 MMOL/L — LOW (ref 9–16)
APAP SERPL-MCNC: <2 UG/ML — LOW (ref 10–30)
APPEARANCE UR: CLEAR — SIGNIFICANT CHANGE UP
AST SERPL-CCNC: 36 U/L — SIGNIFICANT CHANGE UP (ref 15–37)
BASOPHILS NFR BLD AUTO: 0.3 % — SIGNIFICANT CHANGE UP (ref 0–2)
BILIRUB SERPL-MCNC: 0.4 MG/DL — SIGNIFICANT CHANGE UP (ref 0.2–1.2)
BILIRUB UR-MCNC: NEGATIVE — SIGNIFICANT CHANGE UP
BUN SERPL-MCNC: 12 MG/DL — SIGNIFICANT CHANGE UP (ref 7–23)
CALCIUM SERPL-MCNC: 9 MG/DL — SIGNIFICANT CHANGE UP (ref 8.5–10.5)
CHLORIDE SERPL-SCNC: 103 MMOL/L — SIGNIFICANT CHANGE UP (ref 96–108)
CO2 SERPL-SCNC: 28 MMOL/L — SIGNIFICANT CHANGE UP (ref 22–31)
COLOR SPEC: YELLOW — SIGNIFICANT CHANGE UP
CREAT SERPL-MCNC: 0.8 MG/DL — SIGNIFICANT CHANGE UP (ref 0.5–1.3)
DIFF PNL FLD: NEGATIVE — SIGNIFICANT CHANGE UP
EOSINOPHIL NFR BLD AUTO: 1.9 % — SIGNIFICANT CHANGE UP (ref 0–6)
ETHANOL SERPL-MCNC: <3 MG/DL — SIGNIFICANT CHANGE UP
GLUCOSE SERPL-MCNC: 108 MG/DL — HIGH (ref 70–99)
GLUCOSE UR QL: NEGATIVE — SIGNIFICANT CHANGE UP
HCT VFR BLD CALC: 35.5 % — LOW (ref 39–50)
HGB BLD-MCNC: 11.9 G/DL — LOW (ref 13–17)
IMM GRANULOCYTES NFR BLD AUTO: 0.3 % — SIGNIFICANT CHANGE UP (ref 0–1.5)
KETONES UR-MCNC: NEGATIVE — SIGNIFICANT CHANGE UP
LEUKOCYTE ESTERASE UR-ACNC: NEGATIVE — SIGNIFICANT CHANGE UP
LYMPHOCYTES # BLD AUTO: 22.7 % — SIGNIFICANT CHANGE UP (ref 13–44)
MCHC RBC-ENTMCNC: 29.9 PG — SIGNIFICANT CHANGE UP (ref 27–34)
MCHC RBC-ENTMCNC: 33.5 G/DL — SIGNIFICANT CHANGE UP (ref 32–36)
MCV RBC AUTO: 89.2 FL — SIGNIFICANT CHANGE UP (ref 80–100)
MONOCYTES NFR BLD AUTO: 7.9 % — SIGNIFICANT CHANGE UP (ref 2–14)
NEUTROPHILS NFR BLD AUTO: 66.9 % — SIGNIFICANT CHANGE UP (ref 43–77)
NITRITE UR-MCNC: NEGATIVE — SIGNIFICANT CHANGE UP
PCP SPEC-MCNC: SIGNIFICANT CHANGE UP
PH UR: 6.5 — SIGNIFICANT CHANGE UP (ref 5–8)
PLATELET # BLD AUTO: 197 K/UL — SIGNIFICANT CHANGE UP (ref 150–400)
POTASSIUM SERPL-MCNC: 3.7 MMOL/L — SIGNIFICANT CHANGE UP (ref 3.5–5.3)
POTASSIUM SERPL-SCNC: 3.7 MMOL/L — SIGNIFICANT CHANGE UP (ref 3.5–5.3)
PROT SERPL-MCNC: 7.5 G/DL — SIGNIFICANT CHANGE UP (ref 6.4–8.2)
PROT UR-MCNC: ABNORMAL MG/DL
RBC # BLD: 3.98 M/UL — LOW (ref 4.2–5.8)
RBC # FLD: 13.3 % — SIGNIFICANT CHANGE UP (ref 10.3–14.5)
SALICYLATES SERPL-MCNC: 2.2 MG/DL — LOW (ref 2.8–20)
SODIUM SERPL-SCNC: 139 MMOL/L — SIGNIFICANT CHANGE UP (ref 132–145)
SP GR SPEC: 1.01 — SIGNIFICANT CHANGE UP (ref 1–1.03)
UROBILINOGEN FLD QL: 0.2 E.U./DL — SIGNIFICANT CHANGE UP
WBC # BLD: 12.3 K/UL — HIGH (ref 3.8–10.5)
WBC # FLD AUTO: 12.3 K/UL — HIGH (ref 3.8–10.5)

## 2019-05-28 PROCEDURE — 90792 PSYCH DIAG EVAL W/MED SRVCS: CPT | Mod: GT

## 2019-05-28 PROCEDURE — 99284 EMERGENCY DEPT VISIT MOD MDM: CPT

## 2019-05-28 RX ORDER — IBUPROFEN 200 MG
600 TABLET ORAL ONCE
Refills: 0 | Status: COMPLETED | OUTPATIENT
Start: 2019-05-28 | End: 2019-05-28

## 2019-05-28 RX ORDER — ACETAMINOPHEN 500 MG
975 TABLET ORAL ONCE
Refills: 0 | Status: COMPLETED | OUTPATIENT
Start: 2019-05-28 | End: 2019-05-28

## 2019-05-28 RX ADMIN — Medication 600 MILLIGRAM(S): at 18:11

## 2019-05-28 RX ADMIN — Medication 975 MILLIGRAM(S): at 18:11

## 2019-05-28 NOTE — ED BEHAVIORAL HEALTH ASSESSMENT NOTE - DESCRIPTION
as per chart review- HTN  from wife who lives in [place], no children, 12th grade ed Patient in ED for ~ 2 hours prior to Telepsych consult which was requested at 17:45. Nurse Ann states she was not on shift when patient arrived; However patient presents with fair hygiene/grooming, and unaccompanied by family or social supports. Per nurse, patient denies SI and states he came to the ED because of foot pain. Nurse states patients feet appear cracked and dry – no active bleeding or infections. Patient was given Tylenol 975mg ~17:35 and Motrin 600mg ~17:35 with good effect. Per nurse, patient denies SI and HI. Nurse states patient appears euthymic with a mood congruent affect. Per nurse, upon arrival patient was compliant with the triage/interview process, provided routine blood work/urine willingly, changed into a hospital gown, allowed security to wand and collect his belongings without incident. Nothing of note in patient’s belongings. Patient’s speech is at a normal rate and is clear/audible with a linear thought content and good eye contact. Patient denies delusions, VH/AH, is A/OX3 and does not appear cognitively impaired. Patient ate dinner and tolerated it well; he has been resting intermittently. RN reports that patient has been calm/cooperative in the ED, no agitation, aggression or behavioral issues. Patient is engaging with staff appropriately. Patient has not required psychiatric medications or security interventions. Patient was placed on 1:1 observation and has been interacting appropriately with the 1:1. Patient placed in private room for telepsychiatry. No additional complaints at this time.    Vital Signs Last 24 Hrs  T(C): 37.1 (28 May 2019 18:00), Max: 37.1 (28 May 2019 18:00)  T(F): 98.7 (28 May 2019 18:00), Max: 98.7 (28 May 2019 18:00)  HR: 74 (28 May 2019 18:00) (74 - 91)  BP: 149/79 (28 May 2019 18:00) (149/79 - 154/97)  BP(mean): --  RR: 16 (28 May 2019 18:00) (16 - 18)  SpO2: 97% (28 May 2019 18:00) (96% - 97%)

## 2019-05-28 NOTE — ED PROVIDER NOTE - PHYSICAL EXAMINATION
VITAL SIGNS: I have reviewed nursing notes and confirm.  CONSTITUTIONAL: Well-developed; well-nourished; in no acute distress.  SKIN: Blisters to the soles of the feet.  HEAD: Normocephalic; atraumatic.  EYES: PERRL, EOM intact; conjunctiva and sclera clear.  ENT: No nasal discharge; airway clear.  NECK: Supple; non tender.  CARD: S1, S2 normal; no murmurs, gallops, or rubs. Regular rate and rhythm.  RESP: No wheezes, rales or rhonchi.  ABD: Normal bowel sounds; soft; non-distended; non-tender; no hepatosplenomegaly.  EXT: Normal ROM. No clubbing, cyanosis or edema.  LYMPH: No acute cervical adenopathy.  NEURO: Alert, oriented. Grossly unremarkable.  PSYCH: Cooperative, appropriate.

## 2019-05-28 NOTE — ED BEHAVIORAL HEALTH ASSESSMENT NOTE - CASE SUMMARY
As above,52 yo male, undomiciled and unemployed, hx mood disorder, substance abuse,  with ~40 prior IPP admissions (Last at  [facility] 1/23/19), one reported SA via OD (told wife right after attempt), non-adherence to follow up, no known hx of aggression/violence, w/ hx of incarceration for drug possession and larceny, over 40 admissions to medicine/substance detox/rehabs, presenting to ED reporting SI. On evaluation, patient superficial, provocative, affect not congruent with reported mood and provides vague history. Likely malingering and with symptoms that are chronic in nature that do not benefit from inpt hospitalization. Discharge,
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
## 2019-05-28 NOTE — ED BEHAVIORAL HEALTH ASSESSMENT NOTE - RISK ASSESSMENT
Risk factors: Mood episode, alcohol/substance use disorder, agitation/severe anxiety, history of trauma, prior history of suicide attempt, recent inpatient hospitalization, cluster B personality traits, not engaged in outpatient psychiatric treatment, noncompliance with treatment or medications, strained or limited social supports, housing problems, economic problems, ongoing legal problems, lack of insight into illness.    Protective factors: Medically stable, no current active SI/I/P or urges to harm self, future orientation, no access to firearms, help seeking behaviors, and able to engage in safety planning.    Acute Risk (harm to self/others, inability to care for self)  (  ) High    (X) Low (to Moderate)  (  ) Unable to determine   Rationale - See Above Risk/Protective Factors    Elevated Chronic Risk   (  ) No    (X) Yes  Details - See Above Risk/Protective Factors

## 2019-05-28 NOTE — ED PROVIDER NOTE - CARE PLAN
Principal Discharge DX:	Depression, unspecified depression type  Secondary Diagnosis:	Cocaine use with cocaine-induced mood disorder

## 2019-05-28 NOTE — ED PROVIDER NOTE - NSFOLLOWUPINSTRUCTIONS_ED_ALL_ED_FT
Log Out.    [employer] CareNotes®     :  [facility]             SUICIDE PREVENTION - AfterCare(R) Instructions(ER/ED)     Suicide Prevention    WHAT YOU NEED TO KNOW:    You may see suicide as the only way to escape emotional or physical pain and suffering. Help is available from people who care about you, and from professionals trained in suicide prevention. Prevention includes everything you and others can do to stop you from taking your life.    DISCHARGE INSTRUCTIONS:    Call your local emergency number (911 in the [place]), or ask someone to call if:     You do something on purpose to hurt yourself.      You make a plan to commit suicide.    Call your doctor or therapist, or have someone close to you call if:     You act out in anger, are reckless, or are abusing alcohol or drugs.      You have serious thoughts of suicide, even with treatment.      You have more thoughts of suicide when you are alone.      You stop eating, or you begin to smoke cigarettes or drink alcohol.      You have questions or concerns about your condition or care.    What to do if you are considering suicide:     Contact a suicide prevention organization. The following are always available to help you:   National Suicide Prevention Lifeline: 1-873.700.2518 (2-160-131-TALK)      Suicide Hotline: 1-204.636.5033 (2-749-VSPCLGC)      For a list of international numbers: https://save.org/find-help/international-resources/      Contact your therapist. Your doctor can give you a list of therapists if you do not have one.      Keep medicines, weapons, and alcohol out of your home.      Do not spend time alone if you have thoughts of ending your life.    Warning signs of suicide: The following can help you and others recognize that you are struggling:     Talking about your plan for committing suicide, or wanting to read or write about death or suicide      Cutting yourself, burning your skin with cigarettes, or driving recklessly      Drug or alcohol use, not taking your prescribed medicine, or taking too much      Not wanting to spend time with others or doing things you enjoy, feeling bored, or not wanting anyone to praise you      Changes in your appetite, sleep habits, energy levels, or weight      Feeling angry, or lashing out at others      A need to give away or throw away your belongings      Often skipping work      Suddenly not taking medicine for a mental illness without talking to your healthcare provider      Suddenly not going to therapy    Treatment for suicidal thoughts:     Medicines may be given to prevent mood swings, or to decrease anxiety or depression. You will need to take all medicines as directed. A sudden stop can be harmful. It may take 4 to 6 weeks for the medicine to help you feel better.      Suicide risk assessment means healthcare providers will ask questions about your suicide thoughts and plans. They will ask how often you think about suicide and if you have tried it before. They will ask if you have begun to hurt yourself, such as with cutting or reckless driving. They may ask if you have access to weapons or drugs.      A safety plan includes a list of people or groups to contact if you have suicidal feelings again. The list may include friends, family members, a spiritual leader, and others you trust. You may be asked to make a verbal agreement or sign a contract that you will not try to harm yourself.      A therapist can help you identify and change negative feelings or beliefs about yourself. This may help change the way you feel and act. A therapist can also help you find ways to cope with things that cannot be changed.    Manage depression:     Get help for drug or alcohol abuse. Drugs and alcohol can make suicidal feelings worse and make you more likely to act on them. Drugs and alcohol can also cause or increase depression.      Talk to someone you trust. Be honest about your thoughts and feelings about suicide. You can call a suicide prevention center if you do not want to talk to someone you know.      Exercise as directed. Exercise can lift your mood, give you more energy, and make it easier to sleep.       Eat a variety of healthy foods. Healthy foods include fruits, vegetables, whole-grain breads, lean meats, fish, low-fat dairy products, and beans. Try to eat regularly even if you do not feel hungry. Depression can increase from a lack of nutrition or if you are hungry for long periods of time.      Create a sleep routine. Try to go to bed and wake up at the same time every day. Let your healthcare provider know if you are having trouble sleeping.      Take your medicine and go to therapy as directed. Medicine and therapy can help you manage your mental health. Do not stop taking your medicine without talking to your healthcare provider. If you do not like the way a medicine makes you feel, you may be able to try a different medicine.    For support and more information:     National Suicide Prevention Lifeline  [facility],[place]  Phone: 4-422-862-NDQK (7201)  Web Address: http://www.suicidepreventionlifeline.org      Suicide Awareness Voices of Education  [address]  [place], [place]  Phone: 1-250.478.2119  Web Address: http://www.WayConnected.org      Follow up with your doctor or therapist as directed: Write down your questions so you remember to ask them during your visits.

## 2019-05-28 NOTE — ED BEHAVIORAL HEALTH ASSESSMENT NOTE - REFERRAL / APPOINTMENT DETAILS
patient can go to [facility] on [address] tomorrow morning at 8:30am, 5/13/19 to walk in psychiatric clinic BTCM provided list of referrals including local walk-in clinics

## 2019-05-28 NOTE — ED BEHAVIORAL HEALTH ASSESSMENT NOTE - SAFETY PLAN DETAILS
patient to return to ED should symptoms worsen Patient advised to return to ED or call 911 for any worsening symptoms or safety concerns.
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
## 2019-05-28 NOTE — ED PROVIDER NOTE - CLINICAL SUMMARY MEDICAL DECISION MAKING FREE TEXT BOX
Seen and cleared by psych, no active intoxication, has plan to report to [facility] in the AM. Foot pain likely 2/2 overuse (walking long distance today in inappropriate footware), may c/w tylenol/motrin/rest. d/c home with return precautions.

## 2019-05-28 NOTE — ED BEHAVIORAL HEALTH ASSESSMENT NOTE - OTHER PAST PSYCHIATRIC HISTORY (INCLUDE DETAILS REGARDING ONSET, COURSE OF ILLNESS, INPATIENT/OUTPATIENT TREATMENT)
PSYCKES review - Flagged as high utilizer of inpt/ER services. Dx include: Cocaine related d/o, Other psychoactive substance related d/o, Alcohol related d/o, Bipolar 1, Bipolar NOS, MDD, Depressive d/o NOS, Schizoaffective, Substance induced depressive d/o, Bipolar 2, Cannabis related d/o, Tobacco related d/o, Adjustment d/o, Opioid related d/o, Persistent Depressive d/o, Substance induced psychotic d/o, Psychotic d/o NOS, Antisocial PD, Schizophrenia, Substance related d/o. Medications - none since 05/2018. Trials include: Gabapentin, Clonidine, Hydroxyzine, Depakote, Seroquel, Trazodone, Cogentin, Haldol. Hospitalizations: Recently admitted to [facility] 03/12-04[facility]25/2019 for Cocaine Dependence. Total of 84 inpt psych or substance admissions (39 for psych) at: [facility], [facility], [facility], [facility], [facility], [facility] etc. and extensive ER visits as well. Pt's inpt psych admissions are usually very short - only a few days in length but he does have a few which are longer. Pt was seen in outpt 04/23/19 with diagnosis of: "Encounter for general psychiatric examination, requested by authority".

## 2019-05-28 NOTE — ED BEHAVIORAL HEALTH ASSESSMENT NOTE - SUMMARY
52 yo male, undomiciled and unemployed,  from wife "for a few years," with prior chart hx of depression vs Bipolar vs substance induced mood d/o, with ~40 prior IPP admissions (Last at  [facility] 1/23/19), one reported SA via OD (told wife right after attempt), no known hx of aggression/violence, with cocaine, MJ and alcohol use h/o, w/ hx of incarceration for drug possession and larceny, over 40 admissions to medicine/substance detox/rehabs, presenting to ED reporting SI.     Pt with a hx of multiple ED visits for similar presentation with clear secondary gain of bed. He is chronically noncompliant with treatment and per chart review often requests hospitalization only to retract symptoms and sign a 3-day letter. On assessment, patient denies suicidal ideations or AH (as earlier reported), is minimally cooperative and disingenuous. Pt's report of HI is not in the context of psychiatric decompensation but rather indicates a need for anger management counseling. Pt is aware that the behavior is wrong and not acceptable and would lead to arrest if he were to act on it. Pt's presentation is likely motivated by secondary gain and character pathology. The best course of treatment for this patient would be substance use treatment and medication management on an outpatient basis. 54 yo male, undomiciled and unemployed,  from wife "for a few years," with prior chart hx of depression vs Bipolar vs substance induced mood d/o, with ~40 prior IPP admissions (Last at  [facility] 1/23/19), one reported SA via OD (told wife right after attempt), no known hx of aggression/violence, with cocaine, MJ and alcohol use h/o, w/ hx of incarceration for drug possession and larceny, over 40 admissions to medicine/substance detox/rehabs, presenting to ED reporting SI.     Pt with a hx of multiple ED visits for similar presentation with clear secondary gain of bed. He is chronically noncompliant with treatment and per chart review often requests hospitalization only to retract symptoms and sign a 3-day letter. On assessment, patient denies suicidal ideations or AH (as earlier reported), is minimally cooperative and disingenuous. Pt's report of HI is not in the context of psychiatric decompensation but rather indicates a need for anger management counseling. Pt is aware that the behavior is wrong and not acceptable and would lead to arrest if he were to act on it. Pt's presentation is likely motivated by secondary gain and character pathology. The best course of treatment for this patient would be substance use treatment and medication management on an outpatient basis.    Safety Plan Details: As Above  [X] Safety plan discussed with patient   [  ] Education provided regarding environmental safety / lethal means restriction  [X] Provision of National Suicide Prevention Lifeline 5-464-684-TALK (0833)

## 2019-05-28 NOTE — ED ADULT NURSE NOTE - NSIMPLEMENTINTERV_GEN_ALL_ED
Implemented All Universal Safety Interventions:  [place] to call system. Call bell, personal items and telephone within reach. Instruct patient to call for assistance. Room bathroom lighting operational. Non-slip footwear when patient is off stretcher. Physically safe environment: no spills, clutter or unnecessary equipment. Stretcher in lowest position, wheels locked, appropriate side rails in place.
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
## 2019-05-28 NOTE — ED BEHAVIORAL HEALTH ASSESSMENT NOTE - UNDOMICILED
Vaccine Information Statement(s) was given today. This has been reviewed, questions answered, and verbal consent given by Patient for injection(s) and administration of Hepatitis A. And hep b        Patient tolerated without incident. See immunization grid for documentation.  
undomiciled

## 2019-05-28 NOTE — ED ADULT TRIAGE NOTE - CHIEF COMPLAINT QUOTE
Pt. with hx. of bipolar and depression presents c/o suicidal thoughts and hear voices telling him to kill. Pt. reports hx. of suicide attempts. Pt. denies feeling homicidal

## 2019-05-28 NOTE — ED BEHAVIORAL HEALTH ASSESSMENT NOTE - DESCRIPTION (FIRST USE, LAST USE, QUANTITY, FREQUENCY, DURATION)
Pt has a chart hx of daily drinking, minimizes his use today, states he last drank about 2 weeks ago Charted hx of daily use, minimizes his use today, states last smoked about 1-2 weeks ago Charted hx of remote use, pt admits to using about 1-2 weeks ago

## 2019-05-28 NOTE — ED ADULT NURSE NOTE - OBJECTIVE STATEMENT
----- Message from Jung Dutton MD sent at 1/1/2019  9:33 AM CST -----  Inadequate control   What dose is she on? c/o suicidal thoughts, states "I want to take some pills", denies acting on thoughts, denies hi. States he stopped taking anti-depressants 4 months ao, because he wanted to "wean himself off". 1:1 sitter at bedside, will continue to monitor

## 2019-05-29 ENCOUNTER — EMERGENCY (EMERGENCY)
Facility: HOSPITAL | Age: 54
LOS: 1 days | Discharge: HOME | End: 2019-05-29
Attending: EMERGENCY MEDICINE | Admitting: EMERGENCY MEDICINE
Payer: MEDICAID

## 2019-05-29 VITALS
TEMPERATURE: 98 F | OXYGEN SATURATION: 99 % | RESPIRATION RATE: 18 BRPM | SYSTOLIC BLOOD PRESSURE: 160 MMHG | HEART RATE: 88 BPM | DIASTOLIC BLOOD PRESSURE: 89 MMHG

## 2019-05-29 VITALS
TEMPERATURE: 98 F | HEART RATE: 102 BPM | WEIGHT: 199.96 LBS | RESPIRATION RATE: 18 BRPM | DIASTOLIC BLOOD PRESSURE: 93 MMHG | SYSTOLIC BLOOD PRESSURE: 167 MMHG | OXYGEN SATURATION: 99 %

## 2019-05-29 DIAGNOSIS — F60.2 ANTISOCIAL PERSONALITY DISORDER: ICD-10-CM

## 2019-05-29 LAB
ALBUMIN SERPL ELPH-MCNC: 3.8 G/DL — SIGNIFICANT CHANGE UP (ref 3.4–5)
ALP SERPL-CCNC: 68 U/L — SIGNIFICANT CHANGE UP (ref 40–120)
ALT FLD-CCNC: 45 U/L — HIGH (ref 12–42)
ANION GAP SERPL CALC-SCNC: 10 MMOL/L — SIGNIFICANT CHANGE UP (ref 9–16)
APAP SERPL-MCNC: <2 UG/ML — LOW (ref 10–30)
APPEARANCE UR: CLEAR — SIGNIFICANT CHANGE UP
AST SERPL-CCNC: 34 U/L — SIGNIFICANT CHANGE UP (ref 15–37)
BASOPHILS NFR BLD AUTO: 0.3 % — SIGNIFICANT CHANGE UP (ref 0–2)
BILIRUB SERPL-MCNC: 0.5 MG/DL — SIGNIFICANT CHANGE UP (ref 0.2–1.2)
BILIRUB UR-MCNC: NEGATIVE — SIGNIFICANT CHANGE UP
BUN SERPL-MCNC: 10 MG/DL — SIGNIFICANT CHANGE UP (ref 7–23)
CALCIUM SERPL-MCNC: 9 MG/DL — SIGNIFICANT CHANGE UP (ref 8.5–10.5)
CHLORIDE SERPL-SCNC: 102 MMOL/L — SIGNIFICANT CHANGE UP (ref 96–108)
CO2 SERPL-SCNC: 27 MMOL/L — SIGNIFICANT CHANGE UP (ref 22–31)
COLOR SPEC: YELLOW — SIGNIFICANT CHANGE UP
CREAT SERPL-MCNC: 0.8 MG/DL — SIGNIFICANT CHANGE UP (ref 0.5–1.3)
DIFF PNL FLD: NEGATIVE — SIGNIFICANT CHANGE UP
EOSINOPHIL NFR BLD AUTO: 1.1 % — SIGNIFICANT CHANGE UP (ref 0–6)
ETHANOL SERPL-MCNC: <3 MG/DL — SIGNIFICANT CHANGE UP
GLUCOSE SERPL-MCNC: 91 MG/DL — SIGNIFICANT CHANGE UP (ref 70–99)
GLUCOSE UR QL: NEGATIVE — SIGNIFICANT CHANGE UP
HCT VFR BLD CALC: 36.2 % — LOW (ref 39–50)
HGB BLD-MCNC: 11.8 G/DL — LOW (ref 13–17)
IMM GRANULOCYTES NFR BLD AUTO: 0.4 % — SIGNIFICANT CHANGE UP (ref 0–1.5)
KETONES UR-MCNC: 15 MG/DL
LEUKOCYTE ESTERASE UR-ACNC: NEGATIVE — SIGNIFICANT CHANGE UP
LYMPHOCYTES # BLD AUTO: 17 % — SIGNIFICANT CHANGE UP (ref 13–44)
MCHC RBC-ENTMCNC: 29.3 PG — SIGNIFICANT CHANGE UP (ref 27–34)
MCHC RBC-ENTMCNC: 32.6 G/DL — SIGNIFICANT CHANGE UP (ref 32–36)
MCV RBC AUTO: 89.8 FL — SIGNIFICANT CHANGE UP (ref 80–100)
MONOCYTES NFR BLD AUTO: 8 % — SIGNIFICANT CHANGE UP (ref 2–14)
NEUTROPHILS NFR BLD AUTO: 73.2 % — SIGNIFICANT CHANGE UP (ref 43–77)
NITRITE UR-MCNC: NEGATIVE — SIGNIFICANT CHANGE UP
PCP SPEC-MCNC: SIGNIFICANT CHANGE UP
PH UR: 6 — SIGNIFICANT CHANGE UP (ref 5–8)
PLATELET # BLD AUTO: 182 K/UL — SIGNIFICANT CHANGE UP (ref 150–400)
POTASSIUM SERPL-MCNC: 3.7 MMOL/L — SIGNIFICANT CHANGE UP (ref 3.5–5.3)
POTASSIUM SERPL-SCNC: 3.7 MMOL/L — SIGNIFICANT CHANGE UP (ref 3.5–5.3)
PROT SERPL-MCNC: 7.8 G/DL — SIGNIFICANT CHANGE UP (ref 6.4–8.2)
PROT UR-MCNC: NEGATIVE MG/DL — SIGNIFICANT CHANGE UP
RBC # BLD: 4.03 M/UL — LOW (ref 4.2–5.8)
RBC # FLD: 13.2 % — SIGNIFICANT CHANGE UP (ref 10.3–14.5)
SALICYLATES SERPL-MCNC: 2.8 MG/DL — SIGNIFICANT CHANGE UP (ref 2.8–20)
SODIUM SERPL-SCNC: 139 MMOL/L — SIGNIFICANT CHANGE UP (ref 132–145)
SP GR SPEC: 1.01 — SIGNIFICANT CHANGE UP (ref 1–1.03)
UROBILINOGEN FLD QL: 0.2 E.U./DL — SIGNIFICANT CHANGE UP
WBC # BLD: 13.6 K/UL — HIGH (ref 3.8–10.5)
WBC # FLD AUTO: 13.6 K/UL — HIGH (ref 3.8–10.5)

## 2019-05-29 PROCEDURE — 99284 EMERGENCY DEPT VISIT MOD MDM: CPT

## 2019-05-29 PROCEDURE — 90792 PSYCH DIAG EVAL W/MED SRVCS: CPT | Mod: GT

## 2019-05-29 RX ORDER — TETANUS TOXOID, REDUCED DIPHTHERIA TOXOID AND ACELLULAR PERTUSSIS VACCINE, ADSORBED 5; 2.5; 8; 8; 2.5 [IU]/.5ML; [IU]/.5ML; UG/.5ML; UG/.5ML; UG/.5ML
0.5 SUSPENSION INTRAMUSCULAR ONCE
Refills: 0 | Status: COMPLETED | OUTPATIENT
Start: 2019-05-29 | End: 2019-05-29

## 2019-05-29 RX ORDER — HYDROXYZINE HCL 10 MG
1 TABLET ORAL
Qty: 8 | Refills: 0
Start: 2019-05-29 | End: 2019-05-30

## 2019-05-29 RX ADMIN — TETANUS TOXOID, REDUCED DIPHTHERIA TOXOID AND ACELLULAR PERTUSSIS VACCINE, ADSORBED 0.5 MILLILITER(S): 5; 2.5; 8; 8; 2.5 SUSPENSION INTRAMUSCULAR at 13:56
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
## 2019-05-29 NOTE — ED BEHAVIORAL HEALTH ASSESSMENT NOTE - SAFETY PLAN DETAILS
patient to return to ED should symptoms worsen Family and patient advised to return to ED or call 911 for any worsening symptoms or safety concerns.

## 2019-05-29 NOTE — ED BEHAVIORAL HEALTH ASSESSMENT NOTE - REFERRAL / APPOINTMENT DETAILS
patient can go to [facility] [address] tomorrow morning at 8:30am, 5/13/19 to walk in psychiatric clinic Provided with a list of referrals by [facility] including local walk-in clinics
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
## 2019-05-29 NOTE — ED PROVIDER NOTE - NSFOLLOWUPINSTRUCTIONS_ED_ALL_ED_FT
Log Out.    [employer]®     :  [facility]             ABRASION - AfterCare(R) Instructions(ER/ED)     Abrasion    WHAT YOU NEED TO KNOW:    An abrasion is a scrape on your skin. It happens when your skin rubs against a rough surface. Some examples of an abrasion include rug burn, a skinned elbow, or road rash. Abrasions can be many shapes and sizes. The wound may hurt, bleed, bruise, or swell.     DISCHARGE INSTRUCTIONS:    Return to the emergency department if:     The bleeding does not stop after 10 minutes of firm pressure.      You cannot rinse one or more foreign objects out of your wound.      You have red streaks on your skin coming from your wound.    Contact your healthcare provider if:     You have a fever or chills.       Your abrasion is red, warm, swollen, or draining pus.      You have questions or concerns about your condition or care.    Care for your abrasion:     Wash your hands and dry them with a clean towel.      Press a clean cloth against your wound to stop any bleeding.      Rinse your wound with a lot of clean water. Do not use harsh soap, alcohol, or iodine solutions.      Use a clean, wet cloth to remove any objects, such as small pieces of rocks or dirt.      Rub antibiotic ointment on your wound. This may help prevent infection and help your wound heal.      Cover the wound with a non-stick bandage. Change the bandage daily, and if gets wet or dirty.     Follow up with your healthcare provider as directed: Write down your questions so you remember to ask them during your visits.

## 2019-05-29 NOTE — ED BEHAVIORAL HEALTH ASSESSMENT NOTE - OTHER
n/a 15 Shayna Dominguez Disingenuous Likely some chronic impairment Help-seeking behaviors telepych, external billing

## 2019-05-29 NOTE — ED BEHAVIORAL HEALTH ASSESSMENT NOTE - CASE SUMMARY
As above,52 yo male, undomiciled and unemployed, hx mood disorder, substance abuse,  with ~40 prior IPP admissions (Last at  [facility] 1/23/19), one reported SA via OD (told wife right after attempt), non-adherence to follow up, no known hx of aggression/violence, w/ hx of incarceration for drug possession and larceny, over 40 admissions to medicine/substance detox/rehabs, presenting to ED reporting SI. On evaluation, patient superficial, provocative, affect not congruent with reported mood and provides vague history. Likely malingering and with symptoms that are chronic in nature that do not benefit from inpt hospitalization. Discharge, 52 yo AAM, , homeless and unemployed, no significant medical hx, prior documentation of pphx of depression vs Bipolar vs substance induced mood d/o, as well as malingering, with approx. 40 prior inpatient admissions (Last at  [facility] 1/23/19), one reported SA via OD (told wife right after attempt), no known hx of aggression/violence, substance abuse hx of cocaine, MJ and alcohol use h/o, legal hx of incarceration for drug possession and larceny, over 40 admissions to medicine/substance detox/rehabs, presenting to ED after making a self-inflicted cut which he initially claimed was a suicide attempt. pt presented to the ER yesterday for Si/hi, was felt that he was disingenuous and there for secondary gain. he has a history of retracting his suicidality, admitting 72h letters and admitting that he makes states for secondary gain. today he admits to intentionally making a superficial cut in order to return to the hospital and get a tetanus shot (wanted to get one yesterday for his foot but states that he was never given one). once he received the shot he completely denied any SI/i/p. he states that he wants to get on a bus back to [place]. he denies any depressive symptoms, anxiety, manic or psychotic symptoms. he denies any HI. there is no indication to admit the pt to a psychiatric hospital. he will remain at a chronic increased risk for the reasons stated above, however he is at low acute risk for harm to self or others.

## 2019-05-29 NOTE — ED ADULT NURSE NOTE - OBJECTIVE STATEMENT
presents to ED with suicidal ideation and laceration to left inner forearm. pt was seen yesterday at [facility] for SI also. pressure bandage applied. pt placed on constant observation due to chief complaint. pt agitated and slightly cooperative. will continue to monitor.
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
## 2019-05-29 NOTE — ED BEHAVIORAL HEALTH ASSESSMENT NOTE - SUMMARY
52 yo male, undomiciled and unemployed,  from wife "for a few years," with prior chart hx of depression vs Bipolar vs substance induced mood d/o, with ~40 prior IPP admissions (Last at  [facility] 1/23/19), one reported SA via OD (told wife right after attempt), no known hx of aggression/violence, with cocaine, MJ and alcohol use h/o, w/ hx of incarceration for drug possession and larceny, over 40 admissions to medicine/substance detox/rehabs, presenting to ED reporting SI.     Pt with a hx of multiple ED visits for similar presentation with clear secondary gain of bed. He is chronically noncompliant with treatment and per chart review often requests hospitalization only to retract symptoms and sign a 3-day letter. On assessment, patient denies suicidal ideations or AH (as earlier reported), is minimally cooperative and disingenuous. Pt's report of HI is not in the context of psychiatric decompensation but rather indicates a need for anger management counseling. Pt is aware that the behavior is wrong and not acceptable and would lead to arrest if he were to act on it. Pt's presentation is likely motivated by secondary gain and character pathology. The best course of treatment for this patient would be substance use treatment and medication management on an outpatient basis. 52 yo male, undomiciled and unemployed,  from wife "for a few years," with prior chart hx of depression vs Bipolar vs substance induced mood d/o, with ~40 prior IPP admissions (Last at  [facility] 1/23/19), one reported SA via OD (told wife right after attempt), no known hx of aggression/violence, with cocaine, MJ and alcohol use h/o, w/ hx of incarceration for drug possession and larceny, over 40 admissions to medicine/substance detox/rehabs, presenting to ED reporting SI.     Pt seen today after being discharged last night by writer. At that time it was determined by writer that pt was malingering with clear secondary gain of a bed/admission. He is chronically noncompliant with treatment and often requests hospitalization only to retract symptoms and sign a 3-day letter. Yesterday pt was in agreement with the plan for d/c with outpt follow up as he had retracted all his symptoms shortly after arriving to the ED. However, now he is stating he was dissatisfied with the medical care he received as he wanted a tetanus shot but was not given one. As a result, pt cut his forearm superficially (was not even bleeding upon arrival) as a means of manipulating the medical team into giving him what he wants. Pt had reported his behavior as a suicide attempt to the medical team but clearly admits to lying about that and that his motivation was the tetanus shot. Pt states he feels ok and would like to be discharged so he can return to [facility], does not appear to be malingering to the same extent he was yesterday. He denies s/s of depression, jadon or psychosis. Denies suicidal ideations, intent or plans. Pt was offered inpt admission to address his impulsivity but he declined an admission and is requesting discharge at this time. Pt does not meet criteria for involuntary admission and would likely most benefit from substance use treatment and/or medication management which can be done on an outpt basis. Pt's presentation is most consistent with character pathology and manipulation. Will discharge.    Details: As Above  [X] Safety plan discussed with patient   [  ] Education provided regarding environmental safety / lethal means restriction  [X] Provision of National Suicide Prevention Lifeline 7-543-920-TALK (6465)

## 2019-05-29 NOTE — ED BEHAVIORAL HEALTH ASSESSMENT NOTE - DESCRIPTION
Patient in ED for ~ 2 hours prior to Telepsych consult which was requested at 17:45. Nurse Ann states she was not on shift when patient arrived; However patient presents with fair hygiene/grooming, and unaccompanied by family or social supports. Per nurse, patient denies SI and states he came to the ED because of foot pain. Nurse states patients feet appear cracked and dry – no active bleeding or infections. Patient was given Tylenol 975mg ~17:35 and Motrin 600mg ~17:35 with good effect. Per nurse, patient denies SI and HI. Nurse states patient appears euthymic with a mood congruent affect. Per nurse, upon arrival patient was compliant with the triage/interview process, provided routine blood work/urine willingly, changed into a hospital gown, allowed security to wand and collect his belongings without incident. Nothing of note in patient’s belongings. Patient’s speech is at a normal rate and is clear/audible with a linear thought content and good eye contact. Patient denies delusions, VH/AH, is A/OX3 and does not appear cognitively impaired. Patient ate dinner and tolerated it well; he has been resting intermittently. RN reports that patient has been calm/cooperative in the ED, no agitation, aggression or behavioral issues. Patient is engaging with staff appropriately. Patient has not required psychiatric medications or security interventions. Patient was placed on 1:1 observation and has been interacting appropriately with the 1:1. Patient placed in private room for telepsychiatry. No additional complaints at this time.    Vital Signs Last 24 Hrs  T(C): 37.1 (28 May 2019 18:00), Max: 37.1 (28 May 2019 18:00)  T(F): 98.7 (28 May 2019 18:00), Max: 98.7 (28 May 2019 18:00)  HR: 74 (28 May 2019 18:00) (74 - 91)  BP: 149/79 (28 May 2019 18:00) (149/79 - 154/97)  BP(mean): --  RR: 16 (28 May 2019 18:00) (16 - 18)  SpO2: 97% (28 May 2019 18:00) (96% - 97%) as per chart review- HTN  from wife who lives in [place], no children, 12th grade ed Patient in ED for ~ 2.5 hours prior to Telepsych consult which was requested at 14:30. Per nurse Dayana, patient arrived at ~11:51 dressed appropriately for the weather and  presents with fair hygiene/grooming, and unaccompanied by family or social supports. Per nurse, patient endorsed suicidal ideations and presents with a superficial cut to his forearm. She states there is no active bleeding or infection at the side. She states patient was agitated and also requested a tetanus shot – was given at ~13:03 with good effect.  Per nurse, patient denies HI. Per nurse, upon arrival patient was compliant with the triage/interview process, provided routine blood work/urine willingly, changed into a hospital gown, allowed security to wand and collect his belongings without incident. Nothing of note in patient’s belongings. Patient’s speech is at a normal rate and is clear/audible with a linear thought content and good eye contact. Patient denies delusions, VH/AH, is A/OX3 and does not appear cognitively impaired. Patient has been resting for majority of his stay. Patient is engaging with staff appropriately. Patient has not required psychiatric medications or security interventions. Patient was placed on 1:1 observation and has been interacting appropriately with the 1:1. Patient placed in private room for telepsychiatry. No additional complaints at this time.    Vital Signs Last 24 Hrs  T(C): 36.6 (29 May 2019 19:58), Max: 36.9 (28 May 2019 21:17)  T(F): 97.9 (29 May 2019 19:58), Max: 98.5 (28 May 2019 21:17)  HR: 88 (29 May 2019 19:58) (80 - 102)  BP: 160/89 (29 May 2019 19:58) (144/83 - 167/93)  BP(mean): --  RR: 18 (29 May 2019 19:58) (18 - 18)  SpO2: 99% (29 May 2019 19:58) (97% - 99%) Patient in ED for ~ 2.5 hours prior to Telepsych consult which was requested at 14:30. Per nurse Dayana, patient arrived at ~11:51 dressed appropriately for the weather and  presents with fair hygiene/grooming, and unaccompanied by family or social supports. Per nurse, patient endorsed suicidal ideations and presents with a superficial cut to his forearm. She states there is no active bleeding or infection at the site. She states patient was irritable/rude but did not require security intervention or medications. He requested a tetanus shot which was given at ~13:03 without issue.  Per nurse, patient denies HI. Per nurse, upon arrival patient was compliant with the triage/interview process, provided routine blood work/urine willingly, changed into a hospital gown, allowed security to wand and collect his belongings without incident. Nothing of note in patient’s belongings. Patient’s speech is at a normal rate and is clear/audible with a linear thought content and good eye contact. Patient denies delusions, VH/AH, is A/OX3 and does not appear cognitively impaired. Patient has been resting for majority of his stay. Patient is engaging with staff appropriately. Patient has not required psychiatric medications or security interventions. Patient was placed on 1:1 observation and has been interacting appropriately with the 1:1. Patient placed in private room for telepsychiatry. No additional complaints at this time.    Vital Signs Last 24 Hrs  T(C): 36.6 (29 May 2019 19:58), Max: 36.9 (28 May 2019 21:17)  T(F): 97.9 (29 May 2019 19:58), Max: 98.5 (28 May 2019 21:17)  HR: 88 (29 May 2019 19:58) (80 - 102)  BP: 160/89 (29 May 2019 19:58) (144/83 - 167/93)  BP(mean): --  RR: 18 (29 May 2019 19:58) (18 - 18)  SpO2: 99% (29 May 2019 19:58) (97% - 99%)

## 2019-05-29 NOTE — ED BEHAVIORAL HEALTH ASSESSMENT NOTE - DESCRIPTION (FIRST USE, LAST USE, QUANTITY, FREQUENCY, DURATION)
Pt has a chart hx of daily drinking, minimizes his use today, states he last drank about 2 weeks ago Charted hx of daily use, minimizes his use today, states last smoked about 1-2 weeks ago Charted hx of remote use, pt admits to using about 1-2 weeks ago Charted hx of daily use, minimizes his use, states last smoked about 1-2 weeks ago Pt has a chart hx of daily drinking, minimizes his use, drank last night after being discharged from the ER
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
## 2019-05-29 NOTE — ED ADULT TRIAGE NOTE - CHIEF COMPLAINT QUOTE
pt ambulated into ED with laceration to left forearm. Pt appears agitated, states he purposefully cut his left forearm with a knife. Pt with no active bleeding- bandaged applied- pts belongings removed (wanded) and placed in patient scrubs. and 1:1 initiated

## 2019-05-29 NOTE — ED PROVIDER NOTE - PHYSICAL EXAMINATION
VITAL SIGNS: I have reviewed nursing notes and confirm.  CONSTITUTIONAL: Well-developed; well-nourished male w/flat affect lying calmly in stretcher speaking softly in complete sentences following commands appropriately, in no acute distress.  SKIN: Agree with RN documentation regarding decubitus evaluation. Remainder of skin exam is warm and dry, no acute rash.  HEAD: Normocephalic; atraumatic.  EYES: PERRL, EOM intact; conjunctiva and sclera clear.  ENT: No nasal discharge; airway clear.  NECK: Supple; non tender.  CARD: S1, S2 normal; no murmurs, gallops, or rubs. Regular rate and rhythm.  RESP: No wheezes, rales or rhonchi.  ABD: Normal bowel sounds; soft; non-distended; non-tender  EXT: Normal ROM all ext, + linear abrasion 5cm w/out active bleeding ventral aspect L forearm, distal pulses intact, cap refill <2sec all digits,  NEURO: Alert, oriented. Grossly unremarkable.  PSYCH: Cooperative, appropriate.

## 2019-05-29 NOTE — ED BEHAVIORAL HEALTH ASSESSMENT NOTE - OTHER PAST PSYCHIATRIC HISTORY (INCLUDE DETAILS REGARDING ONSET, COURSE OF ILLNESS, INPATIENT/OUTPATIENT TREATMENT)
PSYCKES review - Flagged as high utilizer of inpt/ER services. Dx include: Cocaine related d/o, Other psychoactive substance related d/o, Alcohol related d/o, Bipolar 1, Bipolar NOS, MDD, Depressive d/o NOS, Schizoaffective, Substance induced depressive d/o, Bipolar 2, Cannabis related d/o, Tobacco related d/o, Adjustment d/o, Opioid related d/o, Persistent Depressive d/o, Substance induced psychotic d/o, Psychotic d/o NOS, Antisocial PD, Schizophrenia, Substance related d/o. Medications - none since 05/2018. Trials include: Gabapentin, Clonidine, Hydroxyzine, Depakote, Seroquel, Trazodone, Cogentin, Haldol. Hospitalizations: Recently admitted to [facility] 03/12-04/25/2019 for Cocaine Dependence. Total of 84 inpt psych or substance admissions (39 for psych) at: [facility], [facility], [facility], [facility], [facility], [facility] etc. and extensive ER visits as well. Pt's inpt psych admissions are usually very short - only a few days in length but he does have a few which are longer. Pt was seen in outpt 04/23/19 with diagnosis of: "Encounter for general psychiatric examination, requested by authority".

## 2019-05-29 NOTE — ED BEHAVIORAL HEALTH ASSESSMENT NOTE - RISK ASSESSMENT
Risk factors: Mood episode, alcohol/substance use disorder, agitation/severe anxiety, history of trauma, prior history of suicide attempt, recent inpatient hospitalization, cluster B personality traits, not engaged in outpatient psychiatric treatment, noncompliance with treatment or medications, strained or limited social supports, housing problems, economic problems, ongoing legal problems, lack of insight into illness.    Protective factors: Medically stable, no current active SI/I/P or urges to harm self, future orientation, no access to firearms, help seeking behaviors, and able to engage in safety planning.    Acute Risk (harm to self/others, inability to care for self)  (  ) High    (X) Low (to Moderate)  (  ) Unable to determine   Rationale - See Above Risk/Protective Factors    Elevated Chronic Risk   (  ) No    (X) Yes  Details - See Above Risk/Protective Factors Risk factors: Alcohol/substance use disorder, agitation/severe anxiety, history of trauma, prior history of suicide attempt, recent inpatient hospitalization, cluster B personality traits, not engaged in outpatient psychiatric treatment, noncompliance with treatment or medications, strained or limited social supports, housing problems, economic problems, ongoing legal problems, lack of insight into illness.    Protective factors: Medically stable, no current active SI/I/P or urges to harm self, future orientation, no access to firearms, help seeking behaviors, and able to engage in safety planning.    Acute Risk (harm to self/others, inability to care for self)  (  ) High    (X) Low (to Moderate)  (  ) Unable to determine   Rationale - See Above Risk/Protective Factors    Elevated Chronic Risk   (  ) No    (X) Yes  Details - See Above Risk/Protective Factors
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
## 2019-06-01 DIAGNOSIS — F32.9 MAJOR DEPRESSIVE DISORDER, SINGLE EPISODE, UNSPECIFIED: ICD-10-CM

## 2019-06-01 DIAGNOSIS — S50.812A ABRASION OF LEFT FOREARM, INITIAL ENCOUNTER: ICD-10-CM

## 2019-06-01 DIAGNOSIS — F14.94 COCAINE USE, UNSPECIFIED WITH COCAINE-INDUCED MOOD DISORDER: ICD-10-CM
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
## 2019-06-01 DIAGNOSIS — Z79.899 OTHER LONG TERM (CURRENT) DRUG THERAPY: ICD-10-CM

## 2019-06-01 DIAGNOSIS — Z23 ENCOUNTER FOR IMMUNIZATION: ICD-10-CM
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
## 2019-06-01 DIAGNOSIS — Z91.013 ALLERGY TO SEAFOOD: ICD-10-CM
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
## 2019-06-01 DIAGNOSIS — R45.851 SUICIDAL IDEATIONS: ICD-10-CM

## 2019-10-19 NOTE — DISCHARGE NOTE BEHAVIORAL HEALTH - [id number]_A_CORE
Last visit: 10/3/2019    Allergy notes were reviewed.  See HPI for a chart review summary.     Accompanied by: none    History of present illness:  Leyla Hall is a 68 year old female returning for allergy testing to steroids. Leyla has a history developing swelling and intense pain 4 hours after receiving a \"cortisone\" shot for an arthritis (PIP joint) and ganglion cyst. These reactions occurred about 20 years ago. She has in the more recent past tolerated a Medrol Dosepak. She has osteoarthritis which would benefit from intraarticular shots but d/t her history, she has been having to resort to Norco and gabapentin. She has a history of gastric ulcers with NSAIDs and has to avoid.       She has been doing well since our last visit but is been having joint pain and would like to be able to get treatment for her arthritis.     Medications:  Current Outpatient Medications   Medication Sig Dispense Refill   • gabapentin (NEURONTIN) 600 MG tablet 1 daily 4 PM 2 tablet 0   • HYDROcodone-acetaminophen (NORCO) 5-325 MG per tablet 1 tab  If needed in the morning, 1 tab at nighttime if needed for pain ,reviewed [place] monitoring website 60 tablet 0   • fluticasone (FLONASE) 50 MCG/ACT nasal spray Spray 2 sprays in each nostril daily. SHAKE LIQUID 16 mL 0   • lisinopril (ZESTRIL) 10 MG tablet Take 1 tablet by mouth daily. 90 tablet 3   • diclofenac (VOLTAREN) 1 % gel Apply to the left hip 3 times daily up to 4 weeks . 200 g 0   • aflibercept (EYLEA) 2 MG/0.05ML Solution injection 2 mg by Intravitreal route.     • levothyroxine (SYNTHROID, LEVOTHROID) 200 MCG tablet Take 1 tablet by mouth daily. 30 tablet 11   • amLODIPine (NORVASC) 10 MG tablet Take 10 mg by mouth.     • omeprazole (PRILOSEC) 40 MG capsule Take 40 mg by mouth.     • pravastatin (PRAVACHOL) 10 MG tablet Take 10 mg by mouth.     • sertraline (ZOLOFT) 100 MG tablet Take 100 mg by mouth.       No current facility-administered medications for this  visit.       Past Medical History:  Changes since last visit: No     Allergies:  ALLERGIES: no known allergies.    Family History:  Family changes since last visit: No    Social History:  --Occupation/School changes since last visit :no   --Smoking habit/exposure changes since last visit :no   --Residency changes since last visit :no   --New pets since last visit :no     REVIEW OF SYSTEMS  Please see HPI for pertinent positives. All other systems are negative.      Visit Vitals  /80   Pulse 86   Temp 98.1 °F (36.7 °C)   Ht 5' 6\" (1.676 m)   Wt 112.5 kg (248 lb)   LMP  (LMP Unknown)   SpO2 95%   BMI 40.03 kg/m²     Physical Exam  Vitals signs reviewed.   Constitutional:       Appearance: She is well-developed.   HENT:      Head: Normocephalic.      Right Ear: External ear normal.      Left Ear: External ear normal.      Nose: Nose normal.   Eyes:      Conjunctiva/sclera: Conjunctivae normal.      Pupils: Pupils are equal, round, and reactive to light.   Cardiovascular:      Rate and Rhythm: Normal rate and regular rhythm.      Heart sounds: Normal heart sounds.   Pulmonary:      Effort: Pulmonary effort is normal. No tachypnea, accessory muscle usage or respiratory distress.      Breath sounds: Normal breath sounds. No wheezing or rales.   Abdominal:      General: Bowel sounds are normal. There is no distension.      Palpations: Abdomen is soft.      Tenderness: There is no tenderness.   Musculoskeletal: Normal range of motion.   Skin:     General: Skin is warm.      Findings: No erythema or rash. Rash is not urticarial.   Neurological:      Mental Status: She is alert and oriented to person, place, and time.   Psychiatric:         Behavior: Behavior normal.       Labs/Tests:  Skin prick testing, independently reviewed and interpreted:       Assessment and Plan:   Swelling and pain associated with steroid injections  Summary: Leyla has a history developing swelling and intense pain 4 hours after receiving a  \"cortisone\" shot for an arthritis (PIP joint) and ganglion cyst. These reactions occurred about 20 years ago. She has in the more recent past tolerated a Medrol Dosepak.     Leyla underwent skin prick testing to betamethasone 10/2019 which was negative. Reaction within hours is not consistent with a delay reaction and therefore she does not required patch testing.     Plan:  · Okay to use betamethasone for treatment of pain     Rhinitis - probable allergic   Summary: Rhinorrhea and congestion in the Spring and Fall.  Plan:  · Continue Allegra 180 mg PRN  · Use intranasal fluticasone 1 SEN daily during the Spring and Fall for congestion     Follow up: PRN    Zoey Vergara MD      [address] [place] [id number]

## 2020-01-29 NOTE — PROGRESS NOTE BEHAVIORAL HEALTH - NS ED BHA MSE GENERAL APPEARANCE
Well developed
Well developed
Nursing home
Well developed

## 2020-02-14 NOTE — ED ADULT NURSE NOTE - NS_BH TRG [id number]_ALL_ED
Nancy Crowell was seen today for a hearing evaluation.     Pure tone audiometry revealed normal hearing, AU  SRT and PTA are in good agreement bilaterally.  Excellent speech discrimination for the right ear: Excellent for the left ear   Tympanometry revealed Type A, bilaterally      Recommendations:  1. Otologic Evaluation  2. Annual Audiogram or repeat audiogram as needed  3. Hearing Protection            
Past 24 hrs

## 2020-09-02 ENCOUNTER — EMERGENCY (EMERGENCY)
Facility: HOSPITAL | Age: 55
LOS: 1 days | Discharge: HOME | End: 2020-09-02
Attending: EMERGENCY MEDICINE
Payer: MEDICAID

## 2020-09-02 VITALS
HEART RATE: 50 BPM | OXYGEN SATURATION: 97 % | SYSTOLIC BLOOD PRESSURE: 194 MMHG | TEMPERATURE: 98 F | DIASTOLIC BLOOD PRESSURE: 106 MMHG | RESPIRATION RATE: 16 BRPM

## 2020-09-02 VITALS
OXYGEN SATURATION: 98 % | TEMPERATURE: 98 F | SYSTOLIC BLOOD PRESSURE: 187 MMHG | DIASTOLIC BLOOD PRESSURE: 104 MMHG | RESPIRATION RATE: 16 BRPM | HEART RATE: 61 BPM

## 2020-09-02 LAB
ALBUMIN SERPL ELPH-MCNC: 3.6 G/DL — SIGNIFICANT CHANGE UP (ref 3.5–5)
ALP SERPL-CCNC: 85 U/L — SIGNIFICANT CHANGE UP (ref 40–120)
ALT FLD-CCNC: 26 U/L DA — SIGNIFICANT CHANGE UP (ref 10–60)
ANION GAP SERPL CALC-SCNC: 4 MMOL/L — LOW (ref 5–17)
AST SERPL-CCNC: 27 U/L — SIGNIFICANT CHANGE UP (ref 10–40)
BASOPHILS # BLD AUTO: 0.04 K/UL — SIGNIFICANT CHANGE UP (ref 0–0.2)
BASOPHILS NFR BLD AUTO: 0.4 % — SIGNIFICANT CHANGE UP (ref 0–2)
BILIRUB SERPL-MCNC: 0.2 MG/DL — SIGNIFICANT CHANGE UP (ref 0.2–1.2)
BUN SERPL-MCNC: 10 MG/DL — SIGNIFICANT CHANGE UP (ref 7–18)
CALCIUM SERPL-MCNC: 8.6 MG/DL — SIGNIFICANT CHANGE UP (ref 8.4–10.5)
CHLORIDE SERPL-SCNC: 104 MMOL/L — SIGNIFICANT CHANGE UP (ref 96–108)
CO2 SERPL-SCNC: 29 MMOL/L — SIGNIFICANT CHANGE UP (ref 22–31)
CREAT SERPL-MCNC: 0.75 MG/DL — SIGNIFICANT CHANGE UP (ref 0.5–1.3)
EOSINOPHIL # BLD AUTO: 0.22 K/UL — SIGNIFICANT CHANGE UP (ref 0–0.5)
EOSINOPHIL NFR BLD AUTO: 2.3 % — SIGNIFICANT CHANGE UP (ref 0–6)
ERYTHROCYTE [SEDIMENTATION RATE] IN BLOOD: 9 MM/HR — SIGNIFICANT CHANGE UP (ref 0–20)
GLUCOSE SERPL-MCNC: 93 MG/DL — SIGNIFICANT CHANGE UP (ref 70–99)
HCT VFR BLD CALC: 41.8 % — SIGNIFICANT CHANGE UP (ref 39–50)
HGB BLD-MCNC: 13.3 G/DL — SIGNIFICANT CHANGE UP (ref 13–17)
IMM GRANULOCYTES NFR BLD AUTO: 0.2 % — SIGNIFICANT CHANGE UP (ref 0–1.5)
LYMPHOCYTES # BLD AUTO: 1.67 K/UL — SIGNIFICANT CHANGE UP (ref 1–3.3)
LYMPHOCYTES # BLD AUTO: 17.8 % — SIGNIFICANT CHANGE UP (ref 13–44)
MCHC RBC-ENTMCNC: 29.4 PG — SIGNIFICANT CHANGE UP (ref 27–34)
MCHC RBC-ENTMCNC: 31.8 GM/DL — LOW (ref 32–36)
MCV RBC AUTO: 92.3 FL — SIGNIFICANT CHANGE UP (ref 80–100)
MONOCYTES # BLD AUTO: 0.54 K/UL — SIGNIFICANT CHANGE UP (ref 0–0.9)
MONOCYTES NFR BLD AUTO: 5.8 % — SIGNIFICANT CHANGE UP (ref 2–14)
NEUTROPHILS # BLD AUTO: 6.88 K/UL — SIGNIFICANT CHANGE UP (ref 1.8–7.4)
NEUTROPHILS NFR BLD AUTO: 73.5 % — SIGNIFICANT CHANGE UP (ref 43–77)
NRBC # BLD: 0 /100 WBCS — SIGNIFICANT CHANGE UP (ref 0–0)
PLATELET # BLD AUTO: 215 K/UL — SIGNIFICANT CHANGE UP (ref 150–400)
POTASSIUM SERPL-MCNC: 3.9 MMOL/L — SIGNIFICANT CHANGE UP (ref 3.5–5.3)
POTASSIUM SERPL-SCNC: 3.9 MMOL/L — SIGNIFICANT CHANGE UP (ref 3.5–5.3)
PROT SERPL-MCNC: 7.6 G/DL — SIGNIFICANT CHANGE UP (ref 6–8.3)
RBC # BLD: 4.53 M/UL — SIGNIFICANT CHANGE UP (ref 4.2–5.8)
RBC # FLD: 13.4 % — SIGNIFICANT CHANGE UP (ref 10.3–14.5)
SODIUM SERPL-SCNC: 137 MMOL/L — SIGNIFICANT CHANGE UP (ref 135–145)
TROPONIN I SERPL-MCNC: <0.015 NG/ML — SIGNIFICANT CHANGE UP (ref 0–0.04)
WBC # BLD: 9.37 K/UL — SIGNIFICANT CHANGE UP (ref 3.8–10.5)
WBC # FLD AUTO: 9.37 K/UL — SIGNIFICANT CHANGE UP (ref 3.8–10.5)

## 2020-09-02 PROCEDURE — 85027 COMPLETE CBC AUTOMATED: CPT

## 2020-09-02 PROCEDURE — 36415 COLL VENOUS BLD VENIPUNCTURE: CPT

## 2020-09-02 PROCEDURE — 96374 THER/PROPH/DIAG INJ IV PUSH: CPT

## 2020-09-02 PROCEDURE — 84484 ASSAY OF TROPONIN QUANT: CPT

## 2020-09-02 PROCEDURE — 70450 CT HEAD/BRAIN W/O DYE: CPT | Mod: 26

## 2020-09-02 PROCEDURE — 70450 CT HEAD/BRAIN W/O DYE: CPT

## 2020-09-02 PROCEDURE — 93005 ELECTROCARDIOGRAM TRACING: CPT

## 2020-09-02 PROCEDURE — 99284 EMERGENCY DEPT VISIT MOD MDM: CPT

## 2020-09-02 PROCEDURE — 71045 X-RAY EXAM CHEST 1 VIEW: CPT

## 2020-09-02 PROCEDURE — 71045 X-RAY EXAM CHEST 1 VIEW: CPT | Mod: 26

## 2020-09-02 PROCEDURE — 85652 RBC SED RATE AUTOMATED: CPT

## 2020-09-02 PROCEDURE — 99285 EMERGENCY DEPT VISIT HI MDM: CPT | Mod: 25

## 2020-09-02 PROCEDURE — 80053 COMPREHEN METABOLIC PANEL: CPT

## 2020-09-02 RX ORDER — LISINOPRIL 2.5 MG/1
10 TABLET ORAL ONCE
Refills: 0 | Status: COMPLETED | OUTPATIENT
Start: 2020-09-02 | End: 2020-09-02

## 2020-09-02 RX ORDER — KETOROLAC TROMETHAMINE 30 MG/ML
15 SYRINGE (ML) INJECTION ONCE
Refills: 0 | Status: DISCONTINUED | OUTPATIENT
Start: 2020-09-02 | End: 2020-09-02

## 2020-09-02 RX ADMIN — LISINOPRIL 10 MILLIGRAM(S): 2.5 TABLET ORAL at 18:43

## 2020-09-02 RX ADMIN — Medication 15 MILLIGRAM(S): at 18:43

## 2020-09-02 NOTE — ED ADULT NURSE NOTE - CHIEF COMPLAINT QUOTE
patient brought in by ambulance for generalized weakness, chest pressure and penile discharge. patient was given 81mg ASA by EMS. Patient did not took his medications since last night. Patient escorted by [facility] and in police custody.

## 2020-09-02 NOTE — ED ADULT NURSE NOTE - OBJECTIVE STATEMENT
pt is here for chest pressure. BIBA, generalized weakness, chest pressure and penile discharge, took ASA 81 mg by EMS, escorted by [employer], uncooperative, denied pain or sob, no distress noted at this time.

## 2020-09-02 NOTE — ED PROVIDER NOTE - PATIENT PORTAL LINK FT
You can access the FollowMyHealth Patient Portal offered by [facility] by registering at the following website: [email or web address]. By joining [employer]’s FollowMyHealth portal, you will also be able to view your health information using other applications (apps) compatible with our system.
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
## 2020-09-02 NOTE — ED PROVIDER NOTE - PROGRESS NOTE DETAILS
graham: work up neg. lisinopril given. dc back to [facility] custody. non-compliant with BP meds. graham: rad called regarding cxr - noted pulm nodules- pt informed and will follow up. PERC NEG

## 2020-09-02 NOTE — ED PROVIDER NOTE - CLINICAL SUMMARY MEDICAL DECISION MAKING FREE TEXT BOX
Patient complaining of chest pain. Likely asymptomatic hypertension. Will rule out organ dysfunction vs. malingering as patient is under arrest. Will check labs, EKG, chest x-ray, and CT head. Will give Toradol and likely discharge.
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
## 2021-05-28 NOTE — ED ADULT NURSE NOTE - NSIMPLEMENTINTERV_GEN_ALL_ED
stretcher
Implemented All Universal Safety Interventions:  [place] to call system. Call bell, personal items and telephone within reach. Instruct patient to call for assistance. Room bathroom lighting operational. Non-slip footwear when patient is off stretcher. Physically safe environment: no spills, clutter or unnecessary equipment. Stretcher in lowest position, wheels locked, appropriate side rails in place.

## 2021-06-01 NOTE — ED ADULT TRIAGE NOTE - SOURCE OF INFORMATION
Patient Melolabial Interpolation Flap Text: A decision was made to reconstruct the defect utilizing an interpolation axial flap and a staged reconstruction.  A telfa template was made of the defect.  This telfa template was then used to outline the melolabial interpolation flap.  The donor area for the pedicle flap was then injected with anesthesia.  The flap was excised through the skin and subcutaneous tissue down to the layer of the underlying musculature.  The pedicle flap was carefully excised within this deep plane to maintain its blood supply.  The edges of the donor site were undermined.   The donor site was closed in a primary fashion.  The pedicle was then rotated into position and sutured.  Once the tube was sutured into place, adequate blood supply was confirmed with blanching and refill.  The pedicle was then wrapped with xeroform gauze and dressed appropriately with a telfa and gauze bandage to ensure continued blood supply and protect the attached pedicle.

## 2021-06-15 NOTE — ED ADULT TRIAGE NOTE - NS_BH TRG QUESTION3_ED_ALL_ED
Labs show anemia (but improving), other labs stable or with acceptable variants  Recommend iron per protocol below. Notify patient if not on [address] and add anemia to PMH. Patient should start, or add, an over the counter elemental iron supplement of 45-65 mg. Consider 'Slow FE' or ferrous sulfate 325 mg once a day. If HGB <10, then recommend iron supplement twice a day. Take vitamin C 250 mg and a daily prenatal vitamin to help anemia. Add a stool softener, like docusate or colace 100 mg (2x/day) to avoid constipation. If you do not tolerate supplements you can try blackstrap molasses (1 tbsp=27mg elemental iron). No

## 2021-07-11 ENCOUNTER — EMERGENCY (EMERGENCY)
Facility: HOSPITAL | Age: 56
LOS: 1 days | Discharge: HOME | End: 2021-07-11
Attending: EMERGENCY MEDICINE | Admitting: EMERGENCY MEDICINE
Payer: COMMERCIAL

## 2021-07-11 VITALS
HEIGHT: 69 IN | DIASTOLIC BLOOD PRESSURE: 80 MMHG | SYSTOLIC BLOOD PRESSURE: 144 MMHG | RESPIRATION RATE: 20 BRPM | WEIGHT: 195.11 LBS | HEART RATE: 83 BPM | TEMPERATURE: 98 F | OXYGEN SATURATION: 93 %

## 2021-07-11 DIAGNOSIS — F20.9 SCHIZOPHRENIA, UNSPECIFIED: ICD-10-CM
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
## 2021-07-11 DIAGNOSIS — S20.311A ABRASION OF RIGHT FRONT WALL OF THORAX, INITIAL ENCOUNTER: ICD-10-CM

## 2021-07-11 DIAGNOSIS — S09.90XA UNSPECIFIED INJURY OF HEAD, INITIAL ENCOUNTER: ICD-10-CM
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
## 2021-07-11 DIAGNOSIS — F31.9 BIPOLAR DISORDER, UNSPECIFIED: ICD-10-CM

## 2021-07-11 DIAGNOSIS — S20.411A ABRASION OF RIGHT BACK WALL OF THORAX, INITIAL ENCOUNTER: ICD-10-CM

## 2021-07-11 DIAGNOSIS — S52.201A UNSPECIFIED FRACTURE OF SHAFT OF RIGHT ULNA, INITIAL ENCOUNTER FOR CLOSED FRACTURE: ICD-10-CM

## 2021-07-11 DIAGNOSIS — S05.12XA CONTUSION OF EYEBALL AND ORBITAL TISSUES, LEFT EYE, INITIAL ENCOUNTER: ICD-10-CM

## 2021-07-11 DIAGNOSIS — Z91.013 ALLERGY TO SEAFOOD: ICD-10-CM

## 2021-07-11 DIAGNOSIS — I10 ESSENTIAL (PRIMARY) HYPERTENSION: ICD-10-CM

## 2021-07-11 DIAGNOSIS — S01.01XA LACERATION WITHOUT FOREIGN BODY OF SCALP, INITIAL ENCOUNTER: ICD-10-CM

## 2021-07-11 DIAGNOSIS — S02.2XXA FRACTURE OF NASAL BONES, INITIAL ENCOUNTER FOR CLOSED FRACTURE: ICD-10-CM

## 2021-07-11 DIAGNOSIS — Z20.822 CONTACT WITH AND (SUSPECTED) EXPOSURE TO COVID-19: ICD-10-CM

## 2021-07-11 LAB
ALBUMIN SERPL ELPH-MCNC: 4.3 G/DL — SIGNIFICANT CHANGE UP (ref 3.3–5)
ALP SERPL-CCNC: 92 U/L — SIGNIFICANT CHANGE UP (ref 40–120)
ALT FLD-CCNC: 23 U/L — SIGNIFICANT CHANGE UP (ref 10–45)
AMPHET UR-MCNC: NEGATIVE — SIGNIFICANT CHANGE UP
ANION GAP SERPL CALC-SCNC: 6 MMOL/L — SIGNIFICANT CHANGE UP (ref 5–17)
AST SERPL-CCNC: 41 U/L — HIGH (ref 10–40)
BARBITURATES UR SCN-MCNC: NEGATIVE — SIGNIFICANT CHANGE UP
BASOPHILS # BLD AUTO: 0.06 K/UL — SIGNIFICANT CHANGE UP (ref 0–0.2)
BASOPHILS NFR BLD AUTO: 0.6 % — SIGNIFICANT CHANGE UP (ref 0–2)
BENZODIAZ UR-MCNC: NEGATIVE — SIGNIFICANT CHANGE UP
BILIRUB SERPL-MCNC: 0.2 MG/DL — SIGNIFICANT CHANGE UP (ref 0.2–1.2)
BUN SERPL-MCNC: 15 MG/DL — SIGNIFICANT CHANGE UP (ref 7–23)
CALCIUM SERPL-MCNC: 9.2 MG/DL — SIGNIFICANT CHANGE UP (ref 8.4–10.5)
CHLORIDE SERPL-SCNC: 103 MMOL/L — SIGNIFICANT CHANGE UP (ref 96–108)
CO2 SERPL-SCNC: 28 MMOL/L — SIGNIFICANT CHANGE UP (ref 22–31)
COCAINE METAB.OTHER UR-MCNC: POSITIVE
CREAT SERPL-MCNC: 0.77 MG/DL — SIGNIFICANT CHANGE UP (ref 0.5–1.3)
EOSINOPHIL # BLD AUTO: 0.35 K/UL — SIGNIFICANT CHANGE UP (ref 0–0.5)
EOSINOPHIL NFR BLD AUTO: 3.4 % — SIGNIFICANT CHANGE UP (ref 0–6)
ETHANOL SERPL-MCNC: <10 MG/DL — SIGNIFICANT CHANGE UP (ref 0–10)
GLUCOSE SERPL-MCNC: 99 MG/DL — SIGNIFICANT CHANGE UP (ref 70–99)
HCT VFR BLD CALC: 37.2 % — LOW (ref 39–50)
HGB BLD-MCNC: 12 G/DL — LOW (ref 13–17)
IMM GRANULOCYTES NFR BLD AUTO: 0.3 % — SIGNIFICANT CHANGE UP (ref 0–1.5)
LYMPHOCYTES # BLD AUTO: 2.84 K/UL — SIGNIFICANT CHANGE UP (ref 1–3.3)
LYMPHOCYTES # BLD AUTO: 27.7 % — SIGNIFICANT CHANGE UP (ref 13–44)
MCHC RBC-ENTMCNC: 29.6 PG — SIGNIFICANT CHANGE UP (ref 27–34)
MCHC RBC-ENTMCNC: 32.3 GM/DL — SIGNIFICANT CHANGE UP (ref 32–36)
MCV RBC AUTO: 91.6 FL — SIGNIFICANT CHANGE UP (ref 80–100)
METHADONE UR-MCNC: NEGATIVE — SIGNIFICANT CHANGE UP
MONOCYTES # BLD AUTO: 0.86 K/UL — SIGNIFICANT CHANGE UP (ref 0–0.9)
MONOCYTES NFR BLD AUTO: 8.4 % — SIGNIFICANT CHANGE UP (ref 2–14)
NEUTROPHILS # BLD AUTO: 6.12 K/UL — SIGNIFICANT CHANGE UP (ref 1.8–7.4)
NEUTROPHILS NFR BLD AUTO: 59.6 % — SIGNIFICANT CHANGE UP (ref 43–77)
NRBC # BLD: 0 /100 WBCS — SIGNIFICANT CHANGE UP (ref 0–0)
OPIATES UR-MCNC: NEGATIVE — SIGNIFICANT CHANGE UP
PCP SPEC-MCNC: SIGNIFICANT CHANGE UP
PCP UR-MCNC: NEGATIVE — SIGNIFICANT CHANGE UP
PLATELET # BLD AUTO: 208 K/UL — SIGNIFICANT CHANGE UP (ref 150–400)
POTASSIUM SERPL-MCNC: 4 MMOL/L — SIGNIFICANT CHANGE UP (ref 3.5–5.3)
POTASSIUM SERPL-SCNC: 4 MMOL/L — SIGNIFICANT CHANGE UP (ref 3.5–5.3)
PROT SERPL-MCNC: 7 G/DL — SIGNIFICANT CHANGE UP (ref 6–8.3)
RBC # BLD: 4.06 M/UL — LOW (ref 4.2–5.8)
RBC # FLD: 13.6 % — SIGNIFICANT CHANGE UP (ref 10.3–14.5)
SARS-COV-2 RNA SPEC QL NAA+PROBE: SIGNIFICANT CHANGE UP
SODIUM SERPL-SCNC: 137 MMOL/L — SIGNIFICANT CHANGE UP (ref 135–145)
THC UR QL: POSITIVE
WBC # BLD: 10.26 K/UL — SIGNIFICANT CHANGE UP (ref 3.8–10.5)
WBC # FLD AUTO: 10.26 K/UL — SIGNIFICANT CHANGE UP (ref 3.8–10.5)

## 2021-07-11 PROCEDURE — 80053 COMPREHEN METABOLIC PANEL: CPT

## 2021-07-11 PROCEDURE — 72125 CT NECK SPINE W/O DYE: CPT | Mod: 26

## 2021-07-11 PROCEDURE — 70486 CT MAXILLOFACIAL W/O DYE: CPT

## 2021-07-11 PROCEDURE — 12002 RPR S/N/AX/GEN/TRNK2.6-7.5CM: CPT

## 2021-07-11 PROCEDURE — 82962 GLUCOSE BLOOD TEST: CPT

## 2021-07-11 PROCEDURE — 73110 X-RAY EXAM OF WRIST: CPT

## 2021-07-11 PROCEDURE — 85025 COMPLETE CBC W/AUTO DIFF WBC: CPT

## 2021-07-11 PROCEDURE — 70486 CT MAXILLOFACIAL W/O DYE: CPT | Mod: 26,MG

## 2021-07-11 PROCEDURE — U0003: CPT

## 2021-07-11 PROCEDURE — 99284 EMERGENCY DEPT VISIT MOD MDM: CPT | Mod: 25

## 2021-07-11 PROCEDURE — 99281 EMR DPT VST MAYX REQ PHY/QHP: CPT

## 2021-07-11 PROCEDURE — 72125 CT NECK SPINE W/O DYE: CPT

## 2021-07-11 PROCEDURE — G1004: CPT

## 2021-07-11 PROCEDURE — 70450 CT HEAD/BRAIN W/O DYE: CPT | Mod: 26

## 2021-07-11 PROCEDURE — 73130 X-RAY EXAM OF HAND: CPT

## 2021-07-11 PROCEDURE — 36415 COLL VENOUS BLD VENIPUNCTURE: CPT

## 2021-07-11 PROCEDURE — 73130 X-RAY EXAM OF HAND: CPT | Mod: 26,RT

## 2021-07-11 PROCEDURE — 29125 APPL SHORT ARM SPLINT STATIC: CPT | Mod: RT

## 2021-07-11 PROCEDURE — U0005: CPT

## 2021-07-11 PROCEDURE — 99285 EMERGENCY DEPT VISIT HI MDM: CPT | Mod: 25

## 2021-07-11 PROCEDURE — 73090 X-RAY EXAM OF FOREARM: CPT

## 2021-07-11 PROCEDURE — 73110 X-RAY EXAM OF WRIST: CPT | Mod: 26,RT

## 2021-07-11 PROCEDURE — 80307 DRUG TEST PRSMV CHEM ANLYZR: CPT

## 2021-07-11 PROCEDURE — 73090 X-RAY EXAM OF FOREARM: CPT | Mod: 26,RT

## 2021-07-11 PROCEDURE — 70450 CT HEAD/BRAIN W/O DYE: CPT

## 2021-07-11 RX ORDER — SODIUM CHLORIDE 9 MG/ML
1000 INJECTION INTRAMUSCULAR; INTRAVENOUS; SUBCUTANEOUS ONCE
Refills: 0 | Status: COMPLETED | OUTPATIENT
Start: 2021-07-11 | End: 2021-07-11

## 2021-07-11 RX ADMIN — SODIUM CHLORIDE 1000 MILLILITER(S): 9 INJECTION INTRAMUSCULAR; INTRAVENOUS; SUBCUTANEOUS at 16:48

## 2021-07-11 NOTE — ED PROVIDER NOTE - ENMT, MLM
Airway patent, Nasal mucosa clear. Mouth with normal mucosa. Throat has no vesicles, no oropharyngeal exudates and uvula is midline.  + bruising and tenderness to left orbital region. + dried blood to b/l nares. no septal hematoma present.
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
## 2021-07-11 NOTE — ED PROVIDER NOTE - MUSCULOSKELETAL, MLM
Spine appears normal, range of motion is not limited, no midline c/t/l spine tenderness. + swelling and tenderness to distal left forearm. no bruising. Limited ROM due to pain. no deformity.  remainder of hand and elbow non tender. no shoulder tenderness. mutiple abrasoins to b/l arms. + abrasion to right upper back  and right upper chest.
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
## 2021-07-11 NOTE — ED PROVIDER NOTE - PATIENT PORTAL LINK FT
You can access the FollowMyHealth Patient Portal offered by [facility] by registering at the following website: [email or web address]. By joining [employer]’s FollowMyHealth portal, you will also be able to view your health information using other applications (apps) compatible with our system.
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
## 2021-07-11 NOTE — ED BEHAVIORAL HEALTH ASSESSMENT NOTE - REASON
Pt is intoxicated, unable to participate with meaningful interview at this time and voicing SI/AH which could be secondary to substance intoxication
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
## 2021-07-11 NOTE — ED BEHAVIORAL HEALTH ASSESSMENT NOTE - OTHER PAST PSYCHIATRIC HISTORY (INCLUDE DETAILS REGARDING ONSET, COURSE OF ILLNESS, INPATIENT/OUTPATIENT TREATMENT)
PSYCKES review - Flagged as high utilizer of inpt/ER services. Dx include: Cocaine related d/o, Other psychoactive substance related d/o, Alcohol related d/o, Bipolar 1, Bipolar NOS, MDD, Depressive d/o NOS, Schizoaffective, Substance induced depressive d/o, Bipolar 2, Cannabis related d/o, Tobacco related d/o, Adjustment d/o, Opioid related d/o, Persistent Depressive d/o, Substance induced psychotic d/o, Psychotic d/o NOS, Antisocial PD, Schizophrenia, Substance related d/o. Medications - none since 05/2018. Trials include: Gabapentin, Clonidine, Hydroxyzine, Depakote, Seroquel, Trazodone, Cogentin, Haldol. Hospitalizations: Recently admitted to [facility] 03/12-04/25/2019 for Cocaine Dependence. Total of 84 inpt psych or substance admissions (39 for psych) at: [facility], [facility], [facility], [facility], [facility], [facility] etc. and extensive ER visits as well. Pt's inpt psych admissions are usually very short - only a few days in length but he does have a few which are longer. Pt was seen in outpt 04/23/19 with diagnosis of: "Encounter for general psychiatric examination, requested by authority".

## 2021-07-11 NOTE — ED BEHAVIORAL HEALTH ASSESSMENT NOTE - OTHER
Poorly groomed, disheveled Poor concentration Likely some chronic impairment Unable to assess Sulma Mcgill Constricted, guarded

## 2021-07-11 NOTE — ED BEHAVIORAL HEALTH ASSESSMENT NOTE - RISK ASSESSMENT
Patient is at chronic increased risk secondary to recent history of multiple substances used, past psychiatric history, homelessness, multiple medication trials, numerous admissions and ED visits without compliance to follow up. This risk is mitigated by patient's desire to comply with emergency medical services at this time.     Protective factors: Medically stable, no current active SI/I/P or urges to harm self, future orientation, no access to firearms, help seeking behaviors, and able to engage in safety planning.    Acute Risk (harm to self/others, inability to care for self)  (  ) High    (X) Low (to Moderate)  (  ) Unable to determine   Rationale - See Above Risk/Protective Factors    Elevated Chronic Risk   (  ) No    (X) Yes  Details - See Above Risk/Protective Factors Moderate Acute Suicide Risk
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
## 2021-07-11 NOTE — ED ADULT NURSE REASSESSMENT NOTE - NS ED NURSE REASSESS COMMENT FT1
Pt now awake, alert, oriented x3. Pt reporting suicidal ideation, states "Im scared Im going to kill myself, I've tried before." Pt declines to answer further questions, PA Acosta zavala aware, psych contacted for consult.

## 2021-07-11 NOTE — ED BEHAVIORAL HEALTH ASSESSMENT NOTE - PAST PSYCHOTROPIC MEDICATION
Per Chart: Gabapentin, Clonidine, Hydroxyzine, Depakote, Seroquel, Trazodone, Cogentin, Haldol, Effexor

## 2021-07-11 NOTE — ED BEHAVIORAL HEALTH ASSESSMENT NOTE - NSACTIVEVENT_PSY_ALL_CORE
Triggering events leading to humiliation, shame, and/or despair (e.g., Loss of relationship, financial or health status) (real or anticipated)/Current or pending social isolation/Substance intoxication or withdrawal/Pending incarceration or homelessness/Inadequate social supports/Perceived burden on family or others
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
## 2021-07-11 NOTE — ED PROVIDER NOTE - PROGRESS NOTE DETAILS
Pt evaluated and ct and x-rays done. pt became drowsy after ct scan and uncooperative with consults. pt reports he does a lot of drugs and gets sleepy. labs sent. pt became more awake in ED. pt evaluated and cleared by neurosurgery. ortho pending. pt now reports depression with SI. no current plan. similar visits in past. psych consulted. per psych, to be reassessed in AM. pending splinting by ortho. mohamud: pt received at sign out from gerri zacarias/dr cuellar as pending psych eval and dispo-- deemed safe for dc, refused outpatient resources

## 2021-07-11 NOTE — ED BEHAVIORAL HEALTH ASSESSMENT NOTE - NSPRESENTSXS_PSY_ALL_CORE
Depressed mood/Anhedonia/Hopelessness or despair/Severe anxiety, agitation or panic/Refusal or inability to complete safety plan
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
## 2021-07-11 NOTE — ED BEHAVIORAL HEALTH ASSESSMENT NOTE - DESCRIPTION
as per chart review- HTN Per chart review:  from wife who lives in [place], no children, 12th grade ed See above

## 2021-07-11 NOTE — ED ADULT NURSE REASSESSMENT NOTE - NS ED NURSE REASSESS COMMENT FT1
Patient evaluated , TRAY Ansari and Ortho at bedside, noted to be slow to respond, not answering questions, responds to pain/sternal pressure, no nausea/vomitting.  C collar in place.  Vital signs stable.  Patient care transferred to ALEJANDRINA Gallegos , cardiac monitoring ongoing, labs drawn, fall precaution observed.
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
## 2021-07-11 NOTE — ED PROVIDER NOTE - CARE PLAN
Principal Discharge DX:	Head injury  Secondary Diagnosis:	Laceration of scalp  Secondary Diagnosis:	Ulnar fracture  Secondary Diagnosis:	Nasal fracture

## 2021-07-11 NOTE — ED BEHAVIORAL HEALTH ASSESSMENT NOTE - NS ED B[facility] DEMOGRAPHICS MEDICAL RECORD REVIEWED CONSENT OBTAINED YN
Sent patient MyChart message letting him know his infusion center options at this time; Will wait to hear back from him.    Maci Veloz, MS RN Care Coordinator     No

## 2021-07-11 NOTE — ED PROVIDER NOTE - NSFOLLOWUPINSTRUCTIONS_ED_ALL_ED_FT
Follow up in 7 days for removal of staples. Follow up in 7 days for removal of staples.    Splint Care    WHAT YOU NEED TO KNOW:  Splint care is important to help protect your splint until it comes off. Some splints are made of fiberglass or plaster that will need to dry and harden. Splint care will help the splint dry and harden correctly. Even after your splint hardens, it can be damaged.  DISCHARGE INSTRUCTIONS:  Return to the emergency department if:   •You have increased pain.  •Your fingers or toes are numb or tingling.  •You feel burning or stinging around your injury.  •Your nails, fingers, or toes turn pale, blue, or gray, and feel cold.  •You have new or increased trouble moving your fingers or toes.  •Your swelling gets worse.  •The skin under your splint is bleeding or leaking pus.   Contact your healthcare provider if:   •Your hard splint gets wet or is damaged.  •You have a fever.  •Your splint feels tighter.  •You have itchy, dry skin under your splint that is getting worse.  •The skin under your splint is red, or you have a new sore.  •You notice a bad smell coming from your splint.   •You have questions or concerns about your condition or care.  How to care for your splint:   •Wait for your hard splint to harden completely. You may have to wait up to 3 days before you can walk on a plaster splint.  •Check your splint and the skin around it each day. Check your splint for damage, such as cracks and breaks. Check your skin for redness, increased swelling, and sores. Loosen the elastic bandage around your splint if it feels too tight.  •Keep your splint clean and dry. Keep dirt out of your splint. Before you bathe, wrap your hard splint with 2 layers of plastic. Then put a plastic bag over it. Keep the plastic bag tightly sealed. You can also ask your healthcare provider about waterproof shields. Do not put your hard splint in the water, even with a plastic bag over it. A wet splint can make your skin itchy, and may lead to infection  •Do not put powders or deodorants inside your splint. These can dry your skin and increase itching.   •Do not try to scratch the skin inside your hard splint with sharp objects. Sharp objects can break off inside your splint or hurt your skin.   •Do not pull the padding out of your splint. The padding inside your splint protects your skin. You may develop a sore on your skin if you take out the padding.
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
## 2021-07-11 NOTE — ED PROVIDER NOTE - CARE PROVIDER_API CALL
Yaneth Ponce)  Orthopaedic Surgery Surgery  [address] [place] [address]  Phone: [phone]  Fax: [phone]  Follow Up Time:

## 2021-07-11 NOTE — ED ADULT NURSE REASSESSMENT NOTE - NS ED NURSE REASSESS COMMENT FT1
Nursing care transferred to north side of ED for closer monitoring and telemetry. Blood work sent, blood glucose checked, cardiac monitoring initiated. Pt responsive to painful stimuli (jerked away on initiation of IV).

## 2021-07-12 VITALS
HEART RATE: 66 BPM | OXYGEN SATURATION: 95 % | SYSTOLIC BLOOD PRESSURE: 177 MMHG | DIASTOLIC BLOOD PRESSURE: 100 MMHG | RESPIRATION RATE: 16 BRPM

## 2021-07-12 PROCEDURE — 99283 EMERGENCY DEPT VISIT LOW MDM: CPT
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
## 2021-07-16 LAB — DRUG SCREEN, SERUM: ABNORMAL

## 2021-09-27 NOTE — ED BEHAVIORAL HEALTH ASSESSMENT NOTE - ACCOMPANIED BY
ARIPiprazole 5 mg oral tablet: 1 tab(s) orally once a day  escitalopram 10 mg oral tablet: 1 tab(s) orally once a day  
Self

## 2021-09-28 NOTE — ED ADULT NURSE NOTE - NSFALLRSKINDICATORS_ED_ALL_ED
Quality 110: Preventive Care And Screening: Influenza Immunization: Influenza immunization was not ordered or administered, reason not given Detail Level: Detailed Quality 130: Documentation Of Current Medications In The Medical Record: Current Medications Documented Additional Notes: Pt received COVID vaccines no

## 2022-03-30 NOTE — ED BEHAVIORAL HEALTH ASSESSMENT NOTE - GROOMING
Please see notes below. Pt asking for PT order entered again. Noted Pt order from 9/16/21 was for vertigo. Can you authorize or pt need to be seen?   Fair

## 2022-10-20 NOTE — ED CLERICAL - DIVISION
Prednisone as directed    Albuterol as needed    Nystatin swish and swallow for thrush 4 times a day    Diflucan for vaginitis symptoms.    SAMMY..

## 2022-10-26 NOTE — ED PROVIDER NOTE - PMH
Jc Landrum accompanied their family member to the emergency department on 10/26/2022. They may return to work on 10/28/2022.      If you have any questions or concerns, please don't hesitate to call.      Jc Gonsales MD Depression    Essential hypertension    Tinea pedis of left foot

## 2022-10-27 NOTE — ED BEHAVIORAL HEALTH ASSESSMENT NOTE - AFFECT CONGRUENCE
Patient reports history of intracranial hypertension. Reports headache for two weeks with associated blurred vision, floaters on the right. Patient also is here for her back, from low back radiating to left hip and thigh for over a week. Denies known injury. Denies urinary complaints. Patient went to freestanding ER one week ago and was given lidocaine patches and voltaren gel without improvement.
Not congruent
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
## 2022-10-31 NOTE — PROGRESS NOTE BEHAVIORAL HEALTH - NS ED BHA MED ROS CARDIOVASCULAR
No complaints
no

## 2022-12-20 NOTE — ED ADULT NURSE NOTE - CAS EDP DISCH TYPE
Otc Regimen: Sunscreen with SPF 30 or higher daily on the face and other sun-exposed areas like the scalp, neck, ears, chest, and hands. Sunscreen recommendations include Cetaphil, Cerave, Neutrogena, La Roche Posay, or Elta MD. Detail Level: Zone Home

## 2023-01-31 NOTE — PROGRESS NOTE BEHAVIORAL HEALTH - NS ED BHA MSE SPEECH VOLUME
Normal
Birth Control Pills Pregnancy And Lactation Text: This medication should be avoided if pregnant and for the first 30 days post-partum.

## 2023-04-17 NOTE — ED PROVIDER NOTE - PMH
Depression    Essential hypertension    Tinea pedis of left foot Klisyri Pregnancy And Lactation Text: It is unknown if this medication can harm a developing fetus or if it is excreted in breast milk.

## 2023-06-06 NOTE — ED ADULT TRIAGE NOTE - ESI TRIAGE ACUITY LEVEL, MLM
3
X Size Of Lesion In Cm (Optional): 0
Introduction Text (Please End With A Colon): The following procedure was deferred:Referring with Dr. Ventura*
Instructions (Optional): Currently not bothersome and will schedule at his convenience
Procedure To Be Performed At Next Visit: Excision
Detail Level: Zone

## 2023-08-08 NOTE — ED PROVIDER NOTE - NS ED MD DISPO SPECIAL CONSIDERATION1
[FreeTextEntry1] : Sore throat negative POCT  will send for culture recommend gargling with salt water will start antibiotic as she has multiple strep contacts  None

## 2023-09-06 NOTE — ED BEHAVIORAL HEALTH ASSESSMENT NOTE - AFFECT CONGRUENCE
Airway patent, TM normal bilaterally, normal appearing mouth, nose, throat, neck supple with full range of motion, no cervical adenopathy. Not congruent Congruent

## 2023-09-28 NOTE — PROGRESS NOTE BEHAVIORAL HEALTH - PROBLEM SELECTOR PROBLEM 2
Detail Level: Detailed
Polysubstance abuse

## 2023-10-09 NOTE — ED BEHAVIORAL HEALTH ASSESSMENT NOTE - SECONDARY DX1
Patient from PACU at 1855. Transported in her bed. Pain is 6/10 and improved after IV pain meds. Family at bedside. Continue current POC. Update given at bedside to oncoming RN.    Cannabis use disorder, moderate, dependence

## 2023-10-13 NOTE — ED ADULT NURSE NOTE - CHPI ED NUR SYMPTOMS NEG
no pain/no dizziness/no decreased eating/drinking/no vomiting/no nausea/no chills/no fever/no tingling/no weakness Burow's Advancement Flap Text: The defect edges were debeveled with a #15 scalpel blade.  Given the location of the defect and the proximity to free margins a Burow's advancement flap was deemed most appropriate.  Using a sterile surgical marker, the appropriate advancement flap was drawn incorporating the defect and placing the expected incisions within the relaxed skin tension lines where possible.    The area thus outlined was incised deep to adipose tissue with a #15 scalpel blade.  The skin margins were undermined to an appropriate distance in all directions utilizing iris scissors.

## 2023-10-17 NOTE — PROGRESS NOTE BEHAVIORAL HEALTH - NSBHADMITIPDSM_PSY_A_CORE
19
see above for Axis I, II, III

## 2023-10-26 NOTE — PATIENT PROFILE BEHAVIORAL HEALTH - NSBHPSYHX_PSY_A_CORE
October 2023 visit: EGD November 22 revealed a 2 cm hiatal hernia otherwise normal exam.  Gastric biopsy did not reveal H. pylori infection. Colonoscopy November 22 revealed suboptimal bowel prep with a 4 mm sigmoid colon polyp and therefore a repeat colonoscopy was recommended within a year.  Pathology from the polyp revealed tubular adenoma. Prior to that a colonoscopy in 2017 which had revealed 2 TA. undergoing multiple spine surgeries.  she has difficulty in having BM. soft stools. no rectal bleeding. she reports undergoing extensive workup in the past for pelvic floor dysfunction. she is not sure of what was diagnosed. she describes frequent bloating and gas pain. takes dulcolax prn. fiber makes her nauseas. self-harm/historym of self cutting

## 2023-11-27 NOTE — ED PROVIDER NOTE - NS ED MD DISPO ISOLATION TYPES
Patient is pain in his right leg. He is asking if he can have gabapentin.  Patient also needs a note that he was in the hospital for his work.   None

## 2024-02-12 NOTE — ED ADULT NURSE NOTE - NSFALLRSKOUTCOME_ED_ALL_ED
TO ON CALL    IF within 5 days of symptom onset would pt be candidate?     Universal Safety Interventions

## 2024-03-08 NOTE — ED ADULT NURSE NOTE - NS ED NOTE  TALK SOMEONE YN
[facility], OCCUPATIONAL THERAPY  HOME EXERCISE PROGRAM               Complete massage 2-3 minutes 4 times a day:        Complete 10 repetitions of each exercise 4 times a day:                                     Copyright © I. All rights reserved.     Therapist: NUZHAT Ribeiro/L, CHT   
No

## 2024-07-29 NOTE — ED BEHAVIORAL HEALTH ASSESSMENT NOTE - NS ED BHA MED ROS MUSCULOSKELETAL
Please call patient that lab results were stable , but mild kidney insufficiency stable from prior , CT abdomen pending  
No complaints

## 2024-09-07 NOTE — ED ADULT NURSE NOTE - PAIN RATING/NUMBER SCALE (0-10): ACTIVITY
No care due was identified.  Health [facility] Embedded Care Due Messages. Reference number: [id number].   9/07/2024 11:09:49 AM CDT   0

## 2024-10-25 NOTE — ED BEHAVIORAL HEALTH ASSESSMENT NOTE - AXIS III
[facility] Clinical Pharmacy Services  Alem Mason, Pharmacist    Assessment/ Plan     Assessment & Plan  Type 2 diabetes mellitus with microalbuminuria, without long-term current use of insulin ([facility])    Lab Results   Component Value Date    HGBA1C 8.0 (H) 07/16/2024   Did not apply Linda due to imaging  Has PET scan upcoming 11/4, will wait to apply until after that  Continue metformin titration to increase to 1000mg BID  Large amount of stress in patient's life contributing to uncontrolled glucose  Offered support, counseling, patient has what she needs at the moment               Follow-up: 2 weeks, scheduled    Subjective     Medication Adherence/ Tolerability/ Cost:  Patient denies side effects, no issues with cost, 0 missed doses in last two week  acetaminophen  ARIPiprazole  atorvastatin  Cholecalciferol Caps  FLUoxetine  FreeStyle Linda 3 Sensor Misc  glipiZIDE  ibuprofen  lisinopril  magnesium Oxide Tabs  metFORMIN  multivitamin with minerals  omeprazole  OneTouch Ultra Strp  onetouch ultrasoft  pioglitazone      2. Lifestyle:   Last visit with dietician: no  Previously attended Living Well with Diabetes Class: no  Diet Recall:   Breakfast:   Lunch:   Dinner:   Snacks:   Beverages:   Physical Activity: none    3. Home monitoring devices  Glucometer: Yes, Brand: unknown  Continuous Glucose Monitor: No, Brand: sending today for insurance verification  Blood Pressure monitor: No, Brand: n/a    Hypoglycemia: The patient had 0 episodes/symptoms of hypoglycemia.   Hyperglycemia: The patient had 0 symptoms of hyperglycemia.     Objective       Blood Glucose Readings  CGM Report scanned into chart - connected,awaiting data  The patient is currently checking blood glucose 1 times per day. Patient does not report with SMBG logs.    ASCVD Risk:  The ASCVD Risk score (Astrid MOSQUEDA, et al., 2019) failed to calculate for the following reasons:    The valid total cholesterol range is 130 to 320 mg/dL     Vitals:  There  were no vitals filed for this visit.    Labs:    Lab Results   Component Value Date    SODIUM 139 07/16/2024    K 5.7 (H) 07/16/2024    EGFR 99 07/16/2024    CREATININE 0.65 07/16/2024    GLUF 154 (H) 04/12/2022    [id number] 344 07/10/2016    MICROALBCRE 61 (H) 10/17/2018     Pharmacist Tracking Tool     Pharmacist Tracking Tool  Reason For Outreach: Embedded Pharmacist  Demographics:  Intervention Method: In Person  Type of Intervention: Follow-Up  Topics Addressed: Diabetes  Pharmacologic Interventions: Prevent or Manage ALBERTO and Med Rec  Non-Pharmacologic Interventions: Care coordination, Chart update, Cost, Disease state education, Home Monitoring, Labs, Medication Monitoring, Medication/Device education, and Personal CGM  Time:  Direct Patient Care:  30  mins  Care Coordination:  30  mins  Recommendation Recipient: Patient/Caregiver  Outcome: Accepted       htn

## 2024-12-23 NOTE — ED BEHAVIORAL HEALTH ASSESSMENT NOTE - FAMILY HISTORY OF SUBSTANCE ABUSE
[facility]  [address] [place] [address]    Coronary Angioplasty with or without  Stent (Radial Approach) After Care    Refer to this sheet in the next few weeks. These instructions provide you with information on caring for yourself after your procedure. Your health care provider may also give you more specific instructions. Your treatment has been planned according to current medical practices, but problems sometimes occur. Call your health care provider if you have any problems or questions after your procedure.       Home Care Instructions:  You may shower the day after the procedure. Remove the bandage (dressing) and gently wash the site with plain soap and water. Gently pat the site dry. You may apply a band aid daily for 2 days if desired.    Do not apply powder or lotion to the site.  Do not submerge the affected site in water for 3 to 5 days or until the site is completely healed.   Do not flex or bend at the wrist with affected arm for 24 hours.  Do not lift, push or pull anything over 5 pounds for 5 days after your procedure or as directed by your physician.  For a reference, a gallon of milk weighs 8 pounds.    Do not operate machinery or power tools for 24 hours.  Inspect the site at least twice daily. You may notice some bruising at the site and it may be tender for 1 to 2 weeks.     Increase your fluid intake for the next 2 days.    Keep arm elevated for 24 hours.  For the remainder of the day, keep your arm in the “Pledge of Allegiance” position when up and about.    Limit your activity for the next 48 hours and avoid strenuous activity as directed by your physician.   Cardiac Rehab may or may not be ordered.  Please consult with your physician  You may drive 24 hours after the procedure unless otherwise instructed by your caregiver.  A responsible adult should be with you for the first 24 hours after you arrive home.   Do not make any important legal decisions or sign legal  papers for 24 hours. Do not drink alcohol for 24 hours.    Take medicines only as directed by your health care provider.  Blood thinners may be prescribed after your procedure to improve blood flow through the stent.    Metformin or any medications containing Metformin should not be taken for 48 hours after your procedure.    Eat a heart-healthy diet. This should include plenty of fresh fruits and vegetables. Meat should be lean cuts. Avoid the following types of food:    Food that is high in salt.    Canned or highly processed food.    Food that is high in saturated fat or sugar.    Fried food.    Make any other lifestyle changes recommended by your health care provider. This may include:    Not using any tobacco products including cigarettes, chewing tobacco, or electronic cigarettes.   Managing your weight.    Getting regular exercise.    Managing your blood pressure.    Limiting your alcohol intake.    Managing other health problems, such as diabetes.    If you need an MRI after your heart stent was placed, be sure to tell the health care provider who orders the MRI that you have a heart stent.    Keep all follow-up visits as directed by your health care provider.      Call Your Doctor If:    You have unusual pain at the radial/ulnar (wrist) site.  You have redness, warmth, swelling, or pain at the radial/ulnar (wrist) site.  You have drainage (other than a small amount of blood on the dressing).  `You have chills or a fever > 101.  Your arm becomes pale or dark, cool, tingly, or numb.  You develop chest pain, shortness of breath, feel faint or pass out.    You have heavy bleeding from the site.  If you do, hold pressure on the site for 20 minutes.  If the bleeding stops, apply a fresh bandage and call your cardiologist.  However, if you continue to have bleeding, maintain pressure and call 911.    You have any symptoms of a stroke.  Remember BE FAST  B-balance. Sudden trouble walking or loss of  balance.  E-eyes.  Sudden changes in how you see or a sudden onset of a very bad headache.   F-face. Sudden weakness or loss of feeling of the face or facial droop on one side.   A-arms Sudden weakness or numbness in one arm. One arm drifts down if they are both held out in front of you. This happens suddenly and usually on one side of the body.   S-speech.  Sudden trouble speaking, slurred speech or trouble understanding what people are saying.   T-time  Time to call emergency services.  Write down the symptoms and the time they started.           None known

## 2025-02-07 NOTE — ED PROVIDER NOTE - MUSCULOSKELETAL, MLM
Goal Outcome Evaluation:  Plan of Care Reviewed With: patient, significant other        Progress: improving  Outcome Evaluation: Patient admitted for elective IOL @ 39 weeks. IV access obtained, 18g LWr. SVE @ 530, 2/60/-2. Pitocin started @ 0545, currently infusing @ 4mu/min. VS WNL. Patient reports no pain                              Spine appears normal, range of motion is not limited, no muscle or joint tenderness

## 2025-05-15 NOTE — ED ADULT TRIAGE NOTE - SOURCE OF INFORMATION
Alcohol/Tobacco Use - Stressed importance of smoking cessation and limiting alcohol intake.  CVD Risk Factors - Reviewed  Obesity/Physical Activity - Normal BMI. Encouraged daily 30 minute physical activity x 5 days per week.    Cervical Cancer Screening - No longer indicated. S/p hysterectomy.  Breast Cancer Screening - s/p bilateral mastectomies.   Colon Cancer Screening - Colonoscopy on  Osteoporosis Screening - Last DEXA on 6/2024. Results show Osteopenia. Follow up in 1 year  Eye Exam - Recommend annually.  Dental Exam - Recommend biannually  Vaccinations -   Immunization History   Administered Date(s) Administered    COVID-19, MRNA, LN-S, PF (Pfizer) (Purple Cap) 01/22/2021, 02/12/2021    Influenza (FLUAD) - Quadrivalent - Adjuvanted - PF *Preferred* (65+) 10/25/2022, 11/20/2023    Influenza - Trivalent - Fluad - Adjuvanted - PF (65 years and older 11/06/2024    Influenza Whole 09/02/2009    Pneumococcal Conjugate - 13 Valent 02/24/2020    Pneumococcal Conjugate - 20 Valent 04/27/2022    Pneumococcal Conjugate - 7 Valent 09/02/2009    Zoster Recombinant 02/14/2022, 04/27/2022      Continue Omeprazole 20mg daily  S/p EGD in  1/2025 + for [facility]   Avoid spicy, acidic, fried foods and alcohol.  Eat 2-3 hours before going to bed.  Avoid tight clothing, chew food thoroughly.  Reduce caffeine intake, avoid soda.   Lab Results   Component Value Date    .00 05/13/2025    TRIG 58 05/13/2025    HDL 67 (H) 05/13/2025    TOTALCHOLEST 3 05/13/2025     Hyperlipidemia Medications              omega-3 fatty acids/fish oil (FISH OIL-OMEGA-3 FATTY ACIDS) 300-1,000 mg capsule Take by mouth once daily.     Stressed importance of dietary modifications. Follow a low cholesterol, low saturated fat diet with less that 200mg of cholesterol a day.  Avoid fried foods and high saturated fats (high saturated fats less than 7% of calories).  Add Flax Seed/Fish Oil supplements to diet. Increase dietary fiber.  Regular exercise can reduce LDL and raise HDL. Stressed importance of physical activity 5 times per week for 30 minutes per day.    Lab Results   Component Value Date    TSH 2.116 05/13/2025    [id number] 1.09 05/13/2025      Continue Levothyroxine 112mcg daily  Take medicine on an empty stomach with water (no other medications or beverages). Wait 30 minutes to eat or drink.  Report any symptoms of thinning hair, breaking nails, fatigue, weight gain or loss, palpitations.      Needs cardiac workup - will obtain sooner appt with cards  ED precautions discussed   [id number] Score: 4  Continue Eliquis 5mg BID + Cardizem 120mg ER daily  Needs workup with cardiology - has appt lined up for next month but will call for sooner appt  EKG toady - Sinus Rhythm     Unable to afford Wegovy   Well controlled.   Hypertension Medications              diltiaZEM (CARDIZEM CD) 120 MG Cp24 Take 120 mg by mouth once daily.    hydroCHLOROthiazide (HYDRODIURIL) 25 MG tablet Take 1 tablet (25 mg total) by mouth once daily.    lisinopriL 10 MG tablet TAKE 1 TABLET DAILY   Reduce HCTZ to 12.5mg daily  Low Sodium Diet (DASH Diet - Less than 2 grams of sodium per day).  Monitor blood pressure daily and log. Report consistent numbers greater than 140/90.  Maintain healthy weight with goal BMI <30. Exercise 30 minutes per day, 5 days per week.     Patient 06-Nov-2024 04:37
